# Patient Record
Sex: FEMALE | Race: WHITE | Employment: PART TIME | URBAN - NONMETROPOLITAN AREA
[De-identification: names, ages, dates, MRNs, and addresses within clinical notes are randomized per-mention and may not be internally consistent; named-entity substitution may affect disease eponyms.]

---

## 2017-03-24 ENCOUNTER — OFFICE VISIT (OUTPATIENT)
Dept: FAMILY MEDICINE CLINIC | Age: 62
End: 2017-03-24
Payer: MEDICAID

## 2017-03-24 VITALS
SYSTOLIC BLOOD PRESSURE: 126 MMHG | WEIGHT: 126 LBS | RESPIRATION RATE: 16 BRPM | HEIGHT: 62 IN | TEMPERATURE: 97.8 F | DIASTOLIC BLOOD PRESSURE: 82 MMHG | HEART RATE: 98 BPM | OXYGEN SATURATION: 99 % | BODY MASS INDEX: 23.19 KG/M2

## 2017-03-24 DIAGNOSIS — R74.8 ELEVATED LIVER ENZYMES: ICD-10-CM

## 2017-03-24 DIAGNOSIS — R74.8 ELEVATED LIVER ENZYMES: Primary | ICD-10-CM

## 2017-03-24 DIAGNOSIS — Z23 NEED FOR PROPHYLACTIC VACCINATION AGAINST STREPTOCOCCUS PNEUMONIAE (PNEUMOCOCCUS): ICD-10-CM

## 2017-03-24 LAB
ALBUMIN SERPL-MCNC: 4.8 G/DL (ref 3.5–5.2)
ALP BLD-CCNC: 108 U/L (ref 35–104)
ALT SERPL-CCNC: 20 U/L (ref 5–33)
AMYLASE: 37 U/L (ref 28–100)
AST SERPL-CCNC: 20 U/L (ref 5–32)
BILIRUB SERPL-MCNC: 0.3 MG/DL (ref 0.2–1.2)
BILIRUBIN DIRECT: 0.1 MG/DL (ref 0–0.3)
BILIRUBIN, INDIRECT: 0.2 MG/DL (ref 0.1–1)
LIPASE: 33 U/L (ref 13–60)
TOTAL PROTEIN: 7.4 G/DL (ref 6.6–8.7)

## 2017-03-24 PROCEDURE — 90732 PPSV23 VACC 2 YRS+ SUBQ/IM: CPT | Performed by: NURSE PRACTITIONER

## 2017-03-24 PROCEDURE — 90471 IMMUNIZATION ADMIN: CPT | Performed by: NURSE PRACTITIONER

## 2017-03-24 PROCEDURE — 99213 OFFICE O/P EST LOW 20 MIN: CPT | Performed by: NURSE PRACTITIONER

## 2017-03-24 RX ORDER — OMEPRAZOLE 20 MG/1
CAPSULE, DELAYED RELEASE ORAL
Qty: 30 CAPSULE | Refills: 5 | Status: SHIPPED | OUTPATIENT
Start: 2017-03-24 | End: 2018-01-24 | Stop reason: SDUPTHER

## 2017-03-24 ASSESSMENT — ENCOUNTER SYMPTOMS
ABDOMINAL DISTENTION: 0
BLOOD IN STOOL: 0
CONSTIPATION: 0
DIARRHEA: 0
NAUSEA: 0

## 2017-09-15 ENCOUNTER — OFFICE VISIT (OUTPATIENT)
Dept: FAMILY MEDICINE CLINIC | Age: 62
End: 2017-09-15
Payer: MEDICAID

## 2017-09-15 VITALS
TEMPERATURE: 98.9 F | DIASTOLIC BLOOD PRESSURE: 76 MMHG | BODY MASS INDEX: 24.14 KG/M2 | OXYGEN SATURATION: 99 % | HEART RATE: 100 BPM | WEIGHT: 132 LBS | SYSTOLIC BLOOD PRESSURE: 120 MMHG | RESPIRATION RATE: 16 BRPM

## 2017-09-15 DIAGNOSIS — Z13.220 SCREENING, LIPID: ICD-10-CM

## 2017-09-15 DIAGNOSIS — R53.83 FATIGUE, UNSPECIFIED TYPE: ICD-10-CM

## 2017-09-15 DIAGNOSIS — F41.9 ANXIETY AND DEPRESSION: ICD-10-CM

## 2017-09-15 DIAGNOSIS — F32.A ANXIETY AND DEPRESSION: ICD-10-CM

## 2017-09-15 DIAGNOSIS — Z88.9 ALLERGIC REACTION, HISTORY OF: Primary | ICD-10-CM

## 2017-09-15 LAB
ALBUMIN SERPL-MCNC: 4.5 G/DL (ref 3.5–5.2)
ALP BLD-CCNC: 98 U/L (ref 35–104)
ALT SERPL-CCNC: 19 U/L (ref 5–33)
ANION GAP SERPL CALCULATED.3IONS-SCNC: 16 MMOL/L (ref 7–19)
AST SERPL-CCNC: 23 U/L (ref 5–32)
BASOPHILS ABSOLUTE: 0 K/UL (ref 0–0.2)
BASOPHILS RELATIVE PERCENT: 0.5 % (ref 0–1)
BILIRUB SERPL-MCNC: 0.5 MG/DL (ref 0.2–1.2)
BILIRUBIN URINE: NEGATIVE
BLOOD, URINE: NEGATIVE
BUN BLDV-MCNC: 9 MG/DL (ref 8–23)
CALCIUM SERPL-MCNC: 9.7 MG/DL (ref 8.8–10.2)
CHLORIDE BLD-SCNC: 103 MMOL/L (ref 98–111)
CHOLESTEROL, TOTAL: 339 MG/DL (ref 160–199)
CLARITY: CLEAR
CO2: 24 MMOL/L (ref 22–29)
COLOR: YELLOW
CREAT SERPL-MCNC: 0.4 MG/DL (ref 0.5–0.9)
EOSINOPHILS ABSOLUTE: 0.1 K/UL (ref 0–0.6)
EOSINOPHILS RELATIVE PERCENT: 1.6 % (ref 0–5)
GFR NON-AFRICAN AMERICAN: >60
GLUCOSE BLD-MCNC: 100 MG/DL (ref 74–109)
GLUCOSE URINE: NEGATIVE MG/DL
HCT VFR BLD CALC: 38.2 % (ref 37–47)
HDLC SERPL-MCNC: 51 MG/DL (ref 65–121)
HEMOGLOBIN: 12.8 G/DL (ref 12–16)
KETONES, URINE: NEGATIVE MG/DL
LDL CHOLESTEROL CALCULATED: ABNORMAL MG/DL
LDL CHOLESTEROL DIRECT: 177 MG/DL
LEUKOCYTE ESTERASE, URINE: NEGATIVE
LYMPHOCYTES ABSOLUTE: 1.9 K/UL (ref 1.1–4.5)
LYMPHOCYTES RELATIVE PERCENT: 32.9 % (ref 20–40)
MCH RBC QN AUTO: 31.7 PG (ref 27–31)
MCHC RBC AUTO-ENTMCNC: 33.5 G/DL (ref 33–37)
MCV RBC AUTO: 94.6 FL (ref 81–99)
MONOCYTES ABSOLUTE: 0.4 K/UL (ref 0–0.9)
MONOCYTES RELATIVE PERCENT: 7.1 % (ref 0–10)
NEUTROPHILS ABSOLUTE: 3.4 K/UL (ref 1.5–7.5)
NEUTROPHILS RELATIVE PERCENT: 57.7 % (ref 50–65)
NITRITE, URINE: NEGATIVE
PDW BLD-RTO: 13.1 % (ref 11.5–14.5)
PH UA: 6.5
PLATELET # BLD: 274 K/UL (ref 130–400)
PMV BLD AUTO: 9.4 FL (ref 9.4–12.3)
POTASSIUM SERPL-SCNC: 4 MMOL/L (ref 3.5–5)
PROTEIN UA: NEGATIVE MG/DL
RBC # BLD: 4.04 M/UL (ref 4.2–5.4)
SODIUM BLD-SCNC: 143 MMOL/L (ref 136–145)
SPECIFIC GRAVITY UA: 1.02
T4 FREE: 1.3 NG/DL (ref 0.9–1.7)
TOTAL PROTEIN: 7.4 G/DL (ref 6.6–8.7)
TRIGL SERPL-MCNC: 534 MG/DL (ref 150–199)
TSH SERPL DL<=0.05 MIU/L-ACNC: 1.11 UIU/ML (ref 0.27–4.2)
UROBILINOGEN, URINE: 1 E.U./DL
VITAMIN B-12: 362 PG/ML (ref 211–946)
WBC # BLD: 5.8 K/UL (ref 4.8–10.8)

## 2017-09-15 PROCEDURE — G0444 DEPRESSION SCREEN ANNUAL: HCPCS | Performed by: NURSE PRACTITIONER

## 2017-09-15 PROCEDURE — 99214 OFFICE O/P EST MOD 30 MIN: CPT | Performed by: NURSE PRACTITIONER

## 2017-09-15 RX ORDER — EPINEPHRINE 0.3 MG/.3ML
INJECTION SUBCUTANEOUS
Qty: 2 EACH | Refills: 0 | Status: SHIPPED | OUTPATIENT
Start: 2017-09-15

## 2017-09-15 RX ORDER — ESCITALOPRAM OXALATE 10 MG/1
TABLET ORAL
Qty: 30 TABLET | Refills: 3 | Status: SHIPPED | OUTPATIENT
Start: 2017-09-15 | End: 2018-01-24 | Stop reason: SDUPTHER

## 2017-09-15 RX ORDER — DIPHENHYDRAMINE HCL 25 MG
CAPSULE ORAL
Qty: 30 CAPSULE | Refills: 1 | Status: SHIPPED | OUTPATIENT
Start: 2017-09-15 | End: 2019-03-13 | Stop reason: ALTCHOICE

## 2017-09-15 ASSESSMENT — PATIENT HEALTH QUESTIONNAIRE - PHQ9
9. THOUGHTS THAT YOU WOULD BE BETTER OFF DEAD, OR OF HURTING YOURSELF: 0
7. TROUBLE CONCENTRATING ON THINGS, SUCH AS READING THE NEWSPAPER OR WATCHING TELEVISION: 3
4. FEELING TIRED OR HAVING LITTLE ENERGY: 3
1. LITTLE INTEREST OR PLEASURE IN DOING THINGS: 3
8. MOVING OR SPEAKING SO SLOWLY THAT OTHER PEOPLE COULD HAVE NOTICED. OR THE OPPOSITE, BEING SO FIGETY OR RESTLESS THAT YOU HAVE BEEN MOVING AROUND A LOT MORE THAN USUAL: 3
10. IF YOU CHECKED OFF ANY PROBLEMS, HOW DIFFICULT HAVE THESE PROBLEMS MADE IT FOR YOU TO DO YOUR WORK, TAKE CARE OF THINGS AT HOME, OR GET ALONG WITH OTHER PEOPLE: 3
SUM OF ALL RESPONSES TO PHQ QUESTIONS 1-9: 22
2. FEELING DOWN, DEPRESSED OR HOPELESS: 3
3. TROUBLE FALLING OR STAYING ASLEEP: 3
6. FEELING BAD ABOUT YOURSELF - OR THAT YOU ARE A FAILURE OR HAVE LET YOURSELF OR YOUR FAMILY DOWN: 3
SUM OF ALL RESPONSES TO PHQ9 QUESTIONS 1 & 2: 6
5. POOR APPETITE OR OVEREATING: 1

## 2017-09-15 ASSESSMENT — ENCOUNTER SYMPTOMS
TROUBLE SWALLOWING: 0
DIARRHEA: 0
SHORTNESS OF BREATH: 0
NAUSEA: 0

## 2017-09-20 ENCOUNTER — OFFICE VISIT (OUTPATIENT)
Dept: PSYCHIATRY | Age: 62
End: 2017-09-20
Payer: MEDICAID

## 2017-09-20 DIAGNOSIS — F32.A DEPRESSIVE DISORDER: Primary | ICD-10-CM

## 2017-09-20 DIAGNOSIS — F41.1 GAD (GENERALIZED ANXIETY DISORDER): ICD-10-CM

## 2017-09-20 PROCEDURE — 90791 PSYCH DIAGNOSTIC EVALUATION: CPT | Performed by: SOCIAL WORKER

## 2017-09-29 ENCOUNTER — OFFICE VISIT (OUTPATIENT)
Dept: FAMILY MEDICINE CLINIC | Age: 62
End: 2017-09-29
Payer: MEDICAID

## 2017-09-29 VITALS
WEIGHT: 121.8 LBS | HEART RATE: 89 BPM | RESPIRATION RATE: 16 BRPM | BODY MASS INDEX: 22.28 KG/M2 | DIASTOLIC BLOOD PRESSURE: 70 MMHG | SYSTOLIC BLOOD PRESSURE: 112 MMHG | TEMPERATURE: 98.1 F | OXYGEN SATURATION: 98 %

## 2017-09-29 DIAGNOSIS — E78.2 MIXED HYPERLIPIDEMIA: Primary | ICD-10-CM

## 2017-09-29 DIAGNOSIS — F32.A ANXIETY AND DEPRESSION: ICD-10-CM

## 2017-09-29 DIAGNOSIS — F41.9 ANXIETY AND DEPRESSION: ICD-10-CM

## 2017-09-29 PROCEDURE — 99214 OFFICE O/P EST MOD 30 MIN: CPT | Performed by: NURSE PRACTITIONER

## 2017-09-29 RX ORDER — SIMVASTATIN 40 MG
TABLET ORAL
Qty: 30 TABLET | Refills: 2 | Status: SHIPPED | OUTPATIENT
Start: 2017-09-29 | End: 2018-01-08 | Stop reason: SDUPTHER

## 2017-09-29 ASSESSMENT — ENCOUNTER SYMPTOMS
SHORTNESS OF BREATH: 0
NAUSEA: 0

## 2017-10-04 ENCOUNTER — OFFICE VISIT (OUTPATIENT)
Dept: PSYCHIATRY | Age: 62
End: 2017-10-04
Payer: MEDICAID

## 2017-10-04 DIAGNOSIS — F32.A DEPRESSIVE DISORDER: Primary | ICD-10-CM

## 2017-10-04 PROCEDURE — 90832 PSYTX W PT 30 MINUTES: CPT | Performed by: SOCIAL WORKER

## 2017-10-04 NOTE — PROGRESS NOTES
Behavioral Health Consultation  Francoise Campa [place]  10/4/2017  4:13 PM      Time spent with Patient: 60 minutes  This is patient's second  [facility] appointment. Reason for Consult:    Chief Complaint   Patient presents with    Depression     Referring Provider: SHERRI Gonsalez, [address]      Feedback given to PCP. S:    CC: Depression    More active - went to the [facility] and [facility]. Pt and  bought some baby chicks. Going to visit her pet pig at her relatives home this afternoon. Taking med at night now instead of during the day. Pt says that the higher dose of anti depressant made her feel \"out of it. \" in a \"fog\"   Getting out and doing things- still having to motivate self- but discussed having an awareness of this.          O:  MSE:    Appearance    alert, cooperative, smiling  Appetite normal  Sleep disturbance No  Fatigue Yes  Loss of pleasure Yes  Impulsive behavior No  Speech    spontaneous, normal rate, normal volume and well articulated  Mood    Euthymic in presentation   Affect    normal affect  Thought Content    intact  Thought Process    coherent  Associations    logical connections  Insight    Good  Judgment    Intact  Orientation    oriented to person, place, time, and general circumstances  Memory    recent and remote memory intact  Attention/Concentration    intact  Morbid ideation No  Suicide Assessment    no suicidal ideation      History:    Medications:   Current Outpatient Prescriptions   Medication Sig Dispense Refill    simvastatin (ZOCOR) 40 MG tablet 1/2 po nightly for 1wk then increase to 1 po nightly for cholesterol 30 tablet 2    EPINEPHrine (EPIPEN) 0.3 MG/0.3ML SOAJ injection Use as directed for allergic reaction 2 each 0    diphenhydrAMINE (BENADRYL) 25 MG capsule 1-2 every 6hrs as needed 30 capsule 1    escitalopram (LEXAPRO) 10 MG tablet 1/2 po daily for 1wk then increase to 1 po daily for

## 2017-10-04 NOTE — MR AVS SNAPSHOT
escitalopram (LEXAPRO) 10 MG tablet 1/2 po daily for 1wk then increase to 1 po daily for anxiety    omeprazole (PRILOSEC) 20 MG delayed release capsule 1 po daily for stomach protection    Cyanocobalamin (B-12 PO) Take 1 tablet by mouth daily    Multiple Vitamins-Minerals (THERAPEUTIC MULTIVITAMIN-MINERALS) tablet Take 1 tablet by mouth daily    aspirin EC 81 MG EC tablet Take 81 mg by mouth daily      Allergies              Diclofenac Sodium     Flushing and soa         Additional Information        Basic Information     Date Of Birth Sex Race Ethnicity Preferred Language    1955 Female White Non-/Non  English      Problem List as of 10/4/2017  Date Reviewed: 9/29/2017                Hip pain, left      Immunizations as of 10/4/2017     Name Date    Pneumococcal Polysaccharide ([id number]) 3/24/2017    Tetanus 6/22/2016      Preventive Care        Date Due    HIV screening is recommended for all people regardless of risk factors  aged 15-65 years at least once (lifetime) who have never been HIV tested. 5/28/1970    Tetanus Combination Vaccine (1 - Tdap) 6/23/2016    Yearly Flu Vaccine (1) 11/1/2017 (Originally 9/1/2017)    Mammograms are recommended every 2 years for low/average risk patients aged 48 - 69, and every year for high risk patients per updated national guidelines. However these guidelines can be individualized by your provider. 6/20/2018    Pap Smear 5/16/2019    Cholesterol Screening 9/15/2022    Colonoscopy 5/16/2026            [employer] Signup           [employer] allows you to send messages to your doctor, view your test results, renew your prescriptions, schedule appointments, view visit notes, and more. How Do I Sign Up? 1. In your Internet browser, go to [email or web address]  2. Click on the Sign Up Now link in the Sign In box. You will see the New Member Sign Up page. 3. Enter your [employer] Access Code exactly as it appears below.  You will not need to use this code after youve completed the sign-up process. If you do not sign up before the expiration date, you must request a new code. [employer] Access Code: [email or web address]  Expires: 11/14/2017 12:09 PM    4. Enter your Social Security Number ([id number]) and Date of Birth (mm/dd/yyyy) as indicated and click Submit. You will be taken to the next sign-up page. 5. Create a [employer] ID. This will be your [employer] login ID and cannot be changed, so think of one that is secure and easy to remember. 6. Create a [employer] password. You can change your password at any time. 7. Enter your Password Reset Question and Answer. This can be used at a later time if you forget your password. 8. Enter your e-mail address. You will receive e-mail notification when new information is available in [address] 9. Click Sign Up. You can now view your medical record. Additional Information  If you have questions, please contact the physician practice where you receive care. Remember, [employer] is NOT to be used for urgent needs. For medical emergencies, dial 911. For questions regarding your [employer] account call [phone]. If you have a clinical question, please call your doctor's office.

## 2017-10-04 NOTE — PATIENT INSTRUCTIONS
1. Continue taking medications as prescribed. 2.  Return in 2 weeks as schedule. 3.  Call Esme Martinez at [phone] if you need to come see me.

## 2017-12-21 ENCOUNTER — OFFICE VISIT (OUTPATIENT)
Dept: FAMILY MEDICINE CLINIC | Age: 62
End: 2017-12-21
Payer: MEDICAID

## 2017-12-21 VITALS
DIASTOLIC BLOOD PRESSURE: 78 MMHG | RESPIRATION RATE: 18 BRPM | OXYGEN SATURATION: 98 % | TEMPERATURE: 98 F | WEIGHT: 126 LBS | BODY MASS INDEX: 23.05 KG/M2 | SYSTOLIC BLOOD PRESSURE: 130 MMHG | HEART RATE: 76 BPM

## 2017-12-21 DIAGNOSIS — H66.002 ACUTE SUPPURATIVE OTITIS MEDIA OF LEFT EAR WITHOUT SPONTANEOUS RUPTURE OF TYMPANIC MEMBRANE, RECURRENCE NOT SPECIFIED: Primary | ICD-10-CM

## 2017-12-21 PROCEDURE — 4004F PT TOBACCO SCREEN RCVD TLK: CPT | Performed by: NURSE PRACTITIONER

## 2017-12-21 PROCEDURE — 3017F COLORECTAL CA SCREEN DOC REV: CPT | Performed by: NURSE PRACTITIONER

## 2017-12-21 PROCEDURE — 3014F SCREEN MAMMO DOC REV: CPT | Performed by: NURSE PRACTITIONER

## 2017-12-21 PROCEDURE — G8420 CALC BMI NORM PARAMETERS: HCPCS | Performed by: NURSE PRACTITIONER

## 2017-12-21 PROCEDURE — 99213 OFFICE O/P EST LOW 20 MIN: CPT | Performed by: NURSE PRACTITIONER

## 2017-12-21 PROCEDURE — G8427 DOCREV CUR MEDS BY ELIG CLIN: HCPCS | Performed by: NURSE PRACTITIONER

## 2017-12-21 PROCEDURE — G8484 FLU IMMUNIZE NO ADMIN: HCPCS | Performed by: NURSE PRACTITIONER

## 2017-12-21 RX ORDER — AMOXICILLIN AND CLAVULANATE POTASSIUM 875; 125 MG/1; MG/1
1 TABLET, FILM COATED ORAL 2 TIMES DAILY
Qty: 20 TABLET | Refills: 0 | Status: SHIPPED | OUTPATIENT
Start: 2017-12-21 | End: 2017-12-31

## 2017-12-21 ASSESSMENT — ENCOUNTER SYMPTOMS: COUGH: 1

## 2018-01-08 ENCOUNTER — TELEPHONE (OUTPATIENT)
Dept: FAMILY MEDICINE CLINIC | Age: 63
End: 2018-01-08

## 2018-01-08 DIAGNOSIS — E78.2 MIXED HYPERLIPIDEMIA: ICD-10-CM

## 2018-01-08 RX ORDER — SIMVASTATIN 40 MG
40 TABLET ORAL NIGHTLY
Qty: 30 TABLET | Refills: 2 | Status: SHIPPED | OUTPATIENT
Start: 2018-01-08 | End: 2018-01-24 | Stop reason: SDUPTHER

## 2018-01-08 NOTE — TELEPHONE ENCOUNTER
Patient called requesting refill on simvastatin, she was supposed to have followed up with Arlen Dunbar in October 2017. I have sent in 1 month supply and left voice mail for patient instructing her to call the office and schedule appointment prior to further refills.

## 2018-01-24 ENCOUNTER — TELEPHONE (OUTPATIENT)
Dept: FAMILY MEDICINE CLINIC | Age: 63
End: 2018-01-24

## 2018-01-24 ENCOUNTER — OFFICE VISIT (OUTPATIENT)
Dept: FAMILY MEDICINE CLINIC | Age: 63
End: 2018-01-24
Payer: MEDICAID

## 2018-01-24 VITALS
DIASTOLIC BLOOD PRESSURE: 70 MMHG | BODY MASS INDEX: 23.05 KG/M2 | TEMPERATURE: 98.1 F | OXYGEN SATURATION: 98 % | RESPIRATION RATE: 16 BRPM | HEART RATE: 85 BPM | WEIGHT: 126 LBS | SYSTOLIC BLOOD PRESSURE: 132 MMHG

## 2018-01-24 DIAGNOSIS — F32.A ANXIETY AND DEPRESSION: ICD-10-CM

## 2018-01-24 DIAGNOSIS — Z11.4 SCREENING FOR HIV WITHOUT PRESENCE OF RISK FACTORS: ICD-10-CM

## 2018-01-24 DIAGNOSIS — F41.9 ANXIETY AND DEPRESSION: ICD-10-CM

## 2018-01-24 DIAGNOSIS — E78.2 MIXED HYPERLIPIDEMIA: Primary | ICD-10-CM

## 2018-01-24 DIAGNOSIS — E78.2 MIXED HYPERLIPIDEMIA: ICD-10-CM

## 2018-01-24 LAB
ALBUMIN SERPL-MCNC: 4.6 G/DL (ref 3.5–5.2)
ALP BLD-CCNC: 108 U/L (ref 35–104)
ALT SERPL-CCNC: 74 U/L (ref 5–33)
ANION GAP SERPL CALCULATED.3IONS-SCNC: 18 MMOL/L (ref 7–19)
AST SERPL-CCNC: 46 U/L (ref 5–32)
BILIRUB SERPL-MCNC: 0.5 MG/DL (ref 0.2–1.2)
BUN BLDV-MCNC: 9 MG/DL (ref 8–23)
CALCIUM SERPL-MCNC: 9.3 MG/DL (ref 8.8–10.2)
CHLORIDE BLD-SCNC: 101 MMOL/L (ref 98–111)
CHOLESTEROL, TOTAL: 237 MG/DL (ref 160–199)
CO2: 23 MMOL/L (ref 22–29)
CREAT SERPL-MCNC: <0.5 MG/DL (ref 0.5–0.9)
GFR NON-AFRICAN AMERICAN: >60
GLUCOSE BLD-MCNC: 104 MG/DL (ref 74–109)
HDLC SERPL-MCNC: 68 MG/DL (ref 65–121)
LDL CHOLESTEROL CALCULATED: 93 MG/DL
POTASSIUM SERPL-SCNC: 4.1 MMOL/L (ref 3.5–5)
SODIUM BLD-SCNC: 142 MMOL/L (ref 136–145)
TOTAL PROTEIN: 7.2 G/DL (ref 6.6–8.7)
TRIGL SERPL-MCNC: 380 MG/DL (ref 0–149)

## 2018-01-24 PROCEDURE — 99213 OFFICE O/P EST LOW 20 MIN: CPT | Performed by: NURSE PRACTITIONER

## 2018-01-24 RX ORDER — SIMVASTATIN 40 MG
40 TABLET ORAL NIGHTLY
Qty: 30 TABLET | Refills: 5 | Status: SHIPPED | OUTPATIENT
Start: 2018-01-24 | End: 2018-10-15 | Stop reason: SDUPTHER

## 2018-01-24 RX ORDER — OMEPRAZOLE 20 MG/1
CAPSULE, DELAYED RELEASE ORAL
Qty: 30 CAPSULE | Refills: 5 | Status: SHIPPED | OUTPATIENT
Start: 2018-01-24 | End: 2018-11-12 | Stop reason: SDUPTHER

## 2018-01-24 RX ORDER — ESCITALOPRAM OXALATE 10 MG/1
TABLET ORAL
Qty: 30 TABLET | Refills: 5 | Status: SHIPPED | OUTPATIENT
Start: 2018-01-24 | End: 2018-11-12 | Stop reason: SDUPTHER

## 2018-01-24 RX ORDER — ESCITALOPRAM OXALATE 10 MG/1
TABLET ORAL
Qty: 30 TABLET | Refills: 5 | Status: SHIPPED | OUTPATIENT
Start: 2018-01-24 | End: 2018-01-24 | Stop reason: SDUPTHER

## 2018-01-24 ASSESSMENT — ENCOUNTER SYMPTOMS
SHORTNESS OF BREATH: 0
TROUBLE SWALLOWING: 0

## 2018-01-24 NOTE — PROGRESS NOTES
Subjective:      Patient ID: Devin Ardon is a 58 y.o. female. Chief Complaint   Patient presents with    Hyperlipidemia     Patient is here today for a follow up. Patient is fasting for lab work. HPI  Pt is here today f/u on hyperlipidemia. She denies any complaints with statin treatment. Last lab was done over 4mo ago and pt then started statin. She will have fasting lab done today. She has stopped smoking since last visit. Depression scale has been reviewed. Pt is requesting refill of lexapro and feels it has been working well. She is contributing some stress to work environment and being around smokers as she is trying to abstain from tobacco.  She stopped approx 3mo ago. Review of Systems   Constitutional: Negative for unexpected weight change. HENT: Negative for trouble swallowing. Respiratory: Negative for shortness of breath. Psychiatric/Behavioral: The patient is not nervous/anxious (pt feels medication is working well). Past Medical History:   Diagnosis Date    Anxiety     Depression     Hip pain, left     Hyperlipidemia      Current Outpatient Prescriptions on File Prior to Visit   Medication Sig Dispense Refill    EPINEPHrine (EPIPEN) 0.3 MG/0.3ML SOAJ injection Use as directed for allergic reaction 2 each 0    diphenhydrAMINE (BENADRYL) 25 MG capsule 1-2 every 6hrs as needed 30 capsule 1    Cyanocobalamin (B-12 PO) Take 1 tablet by mouth daily      Multiple Vitamins-Minerals (THERAPEUTIC MULTIVITAMIN-MINERALS) tablet Take 1 tablet by mouth daily      aspirin EC 81 MG EC tablet Take 81 mg by mouth daily       No current facility-administered medications on file prior to visit. Allergies   Allergen Reactions    Peanut-Containing Drug Products Shortness Of Breath    Diclofenac Sodium      Flushing and soa       Objective:   Physical Exam   Constitutional: She is oriented to person, place, and time. She appears well-developed and well-nourished. HENT:   Head: Normocephalic and atraumatic. Eyes: Conjunctivae and EOM are normal. Pupils are equal, round, and reactive to light. Neck: Normal range of motion. Neck supple. No tracheal deviation present. No thyromegaly present. Cardiovascular: Normal rate, regular rhythm and normal heart sounds. Pulmonary/Chest: Effort normal and breath sounds normal. No respiratory distress. Neurological: She is alert and oriented to person, place, and time. Skin: Skin is warm and dry. Psychiatric: She has a normal mood and affect. Her speech is normal and behavior is normal. Judgment and thought content normal. Her mood appears not anxious. Her affect is not angry. Cognition and memory are normal. She does not exhibit a depressed mood. Nursing note and vitals reviewed. /70 (Site: Right Arm, Position: Sitting, Cuff Size: Small Adult)   Pulse 85   Temp 98.1 °F (36.7 °C) (Temporal)   Resp 16   Wt 126 lb (57.2 kg)   SpO2 98%   BMI 23.05 kg/m²     Assessment:      1. Mixed hyperlipidemia  simvastatin (ZOCOR) 40 MG tablet   2. Anxiety and depression  escitalopram (LEXAPRO) 10 MG tablet   3. Screening for HIV without presence of risk factors  HIV-1,-2 w/Reflex to HIV-1 Western Blot           Plan:    Fasting lab today as previously ordered.   Pt to f/u prn and pending lab review

## 2018-01-27 LAB — HIV-1 AND HIV-2 ANTIBODIES: NEGATIVE

## 2018-03-16 ENCOUNTER — OFFICE VISIT (OUTPATIENT)
Dept: FAMILY MEDICINE CLINIC | Age: 63
End: 2018-03-16
Payer: COMMERCIAL

## 2018-03-16 VITALS
WEIGHT: 126 LBS | OXYGEN SATURATION: 98 % | RESPIRATION RATE: 16 BRPM | HEART RATE: 88 BPM | SYSTOLIC BLOOD PRESSURE: 134 MMHG | DIASTOLIC BLOOD PRESSURE: 80 MMHG | HEIGHT: 63 IN | BODY MASS INDEX: 22.32 KG/M2 | TEMPERATURE: 98 F

## 2018-03-16 DIAGNOSIS — Z12.31 SCREENING MAMMOGRAM, ENCOUNTER FOR: ICD-10-CM

## 2018-03-16 DIAGNOSIS — R74.8 ELEVATED LIVER ENZYMES: Primary | ICD-10-CM

## 2018-03-16 DIAGNOSIS — R74.8 ELEVATED LIVER ENZYMES: ICD-10-CM

## 2018-03-16 LAB
ALBUMIN SERPL-MCNC: 4.7 G/DL (ref 3.5–5.2)
ALP BLD-CCNC: 98 U/L (ref 35–104)
ALT SERPL-CCNC: 41 U/L (ref 5–33)
AST SERPL-CCNC: 31 U/L (ref 5–32)
BILIRUB SERPL-MCNC: 0.4 MG/DL (ref 0.2–1.2)
BILIRUBIN DIRECT: 0.1 MG/DL (ref 0–0.3)
BILIRUBIN, INDIRECT: 0.3 MG/DL (ref 0.1–1)
TOTAL PROTEIN: 7.1 G/DL (ref 6.6–8.7)

## 2018-03-16 PROCEDURE — 99213 OFFICE O/P EST LOW 20 MIN: CPT | Performed by: NURSE PRACTITIONER

## 2018-03-16 ASSESSMENT — ENCOUNTER SYMPTOMS
CONSTIPATION: 0
DIARRHEA: 0
NAUSEA: 0
VOMITING: 0

## 2018-03-16 ASSESSMENT — PATIENT HEALTH QUESTIONNAIRE - PHQ9
SUM OF ALL RESPONSES TO PHQ QUESTIONS 1-9: 0
1. LITTLE INTEREST OR PLEASURE IN DOING THINGS: 0
SUM OF ALL RESPONSES TO PHQ9 QUESTIONS 1 & 2: 0

## 2018-03-16 NOTE — PROGRESS NOTES
the non-reactive anti-HIV (MJ) screen, the HIV Western blot  is not  indicated and therefore not performed. INTERPRETIVE INFORMATION: HIV-1,-2 w/Reflex to HIV-1 Western Blot  This assay should not be used for blood donor screening, associated  re-entry  protocols, or for screening Human Cells, Tissues and Cellular and  Tissue-Based Products (HCT/P). Performed by [facility] , [address] [facility] [phone]  www. Frank Gooden MD - Lab.  Director      Cholesterol, Total 01/24/2018 237* 160 - 199 mg/dL Final    Comment: <160 MG/DL=OPTIMAL    160-199 MG/DL= DESIRABLE    200-239 MG/DL=BORDERLINE-INCREASED RISK OF ATHEROSCLEROTIC  CARDIOVASCULAR DISEASE    > OR = 240 MG/DL-ASSOCIATED WITH AN INCREASED RISK OF  ATHROSCLEROTIC CARDIOVASCULAR DISEASE      Triglycerides 01/24/2018 380* 0 - 149 mg/dL Final    HDL 01/24/2018 68  65 - 121 mg/dL Final    Comment: VALUES>60 MG/DL ARE ASSOCIATED WITH A DECREASED RISK OF  ATHEROSCLEROTIC CARDIOVASCULAR DISEASE      LDL Calculated 01/24/2018 93  <100 mg/dL Final    Comment: <100 MG/DL=OPITIMAL    100-129 MG/DL=DESIRABLE    130-159 MG/DL BORDERLINE=INCREASED RISK OF ATHEROSCLEROTIC  CARDIOVASCULAR DISEASE    > OR = 160 MG/DL=ASSOCIATED WITH AN INCREASE RISK OF  ATHEROSCLEROTIC CARDIOVASCULAR DISEASE       Lab reviewed with patient in detail                   Past Medical History:   Diagnosis Date    Anxiety     Depression     Hip pain, left     Hyperlipidemia      Current Outpatient Prescriptions on File Prior to Visit   Medication Sig Dispense Refill    simvastatin (ZOCOR) 40 MG tablet Take 1 tablet by mouth nightly 30 tablet 5    omeprazole (PRILOSEC) 20 MG delayed release capsule 1 po daily for stomach protection 30 capsule 5    escitalopram (LEXAPRO) 10 MG tablet 1 po daily for anxiety 30 tablet 5    EPINEPHrine (EPIPEN) 0.3 MG/0.3ML SOAJ injection Use as directed for allergic reaction 2 each 0    diphenhydrAMINE (BENADRYL) 25 MG

## 2018-10-15 DIAGNOSIS — E78.2 MIXED HYPERLIPIDEMIA: ICD-10-CM

## 2018-10-15 RX ORDER — SIMVASTATIN 40 MG
40 TABLET ORAL NIGHTLY
Qty: 30 TABLET | Refills: 0 | Status: SHIPPED | OUTPATIENT
Start: 2018-10-15 | End: 2018-11-12 | Stop reason: SDUPTHER

## 2018-11-12 ENCOUNTER — OFFICE VISIT (OUTPATIENT)
Dept: FAMILY MEDICINE CLINIC | Age: 63
End: 2018-11-12
Payer: COMMERCIAL

## 2018-11-12 ENCOUNTER — HOSPITAL ENCOUNTER (OUTPATIENT)
Dept: GENERAL RADIOLOGY | Age: 63
Discharge: HOME | End: 2018-11-12
Payer: COMMERCIAL

## 2018-11-12 VITALS
HEART RATE: 86 BPM | TEMPERATURE: 97.8 F | SYSTOLIC BLOOD PRESSURE: 118 MMHG | DIASTOLIC BLOOD PRESSURE: 82 MMHG | RESPIRATION RATE: 18 BRPM | HEIGHT: 62 IN | OXYGEN SATURATION: 99 % | WEIGHT: 125 LBS | BODY MASS INDEX: 23 KG/M2

## 2018-11-12 DIAGNOSIS — F41.9 ANXIETY AND DEPRESSION: ICD-10-CM

## 2018-11-12 DIAGNOSIS — K21.9 GASTROESOPHAGEAL REFLUX DISEASE, ESOPHAGITIS PRESENCE NOT SPECIFIED: ICD-10-CM

## 2018-11-12 DIAGNOSIS — E78.2 MIXED HYPERLIPIDEMIA: ICD-10-CM

## 2018-11-12 DIAGNOSIS — J06.9 ACUTE URI: ICD-10-CM

## 2018-11-12 DIAGNOSIS — R80.9 PROTEINURIA, UNSPECIFIED TYPE: Primary | ICD-10-CM

## 2018-11-12 DIAGNOSIS — F32.A ANXIETY AND DEPRESSION: ICD-10-CM

## 2018-11-12 DIAGNOSIS — J06.9 ACUTE URI: Primary | ICD-10-CM

## 2018-11-12 LAB
ALBUMIN SERPL-MCNC: 4.6 G/DL (ref 3.5–5.2)
ALP BLD-CCNC: 97 U/L (ref 35–104)
ALT SERPL-CCNC: 38 U/L (ref 5–33)
ANION GAP SERPL CALCULATED.3IONS-SCNC: 17 MMOL/L (ref 7–19)
AST SERPL-CCNC: 34 U/L (ref 5–32)
BASOPHILS ABSOLUTE: 0 K/UL (ref 0–0.2)
BASOPHILS RELATIVE PERCENT: 0.8 % (ref 0–1)
BILIRUB SERPL-MCNC: 0.4 MG/DL (ref 0.2–1.2)
BILIRUBIN URINE: NEGATIVE
BLOOD, URINE: NEGATIVE
BUN BLDV-MCNC: 9 MG/DL (ref 8–23)
CALCIUM SERPL-MCNC: 9.4 MG/DL (ref 8.8–10.2)
CHLORIDE BLD-SCNC: 101 MMOL/L (ref 98–111)
CHOLESTEROL, TOTAL: 242 MG/DL (ref 160–199)
CLARITY: CLEAR
CO2: 25 MMOL/L (ref 22–29)
COLOR: YELLOW
CREAT SERPL-MCNC: 0.5 MG/DL (ref 0.5–0.9)
EOSINOPHILS ABSOLUTE: 0.1 K/UL (ref 0–0.6)
EOSINOPHILS RELATIVE PERCENT: 1.5 % (ref 0–5)
GFR NON-AFRICAN AMERICAN: >60
GLUCOSE BLD-MCNC: 111 MG/DL (ref 74–109)
GLUCOSE URINE: NEGATIVE MG/DL
HCT VFR BLD CALC: 39.3 % (ref 37–47)
HDLC SERPL-MCNC: 78 MG/DL (ref 65–121)
HEMOGLOBIN: 12.9 G/DL (ref 12–16)
KETONES, URINE: NEGATIVE MG/DL
LDL CHOLESTEROL CALCULATED: 92 MG/DL
LEUKOCYTE ESTERASE, URINE: NEGATIVE
LYMPHOCYTES ABSOLUTE: 1.3 K/UL (ref 1.1–4.5)
LYMPHOCYTES RELATIVE PERCENT: 27.2 % (ref 20–40)
MCH RBC QN AUTO: 30.7 PG (ref 27–31)
MCHC RBC AUTO-ENTMCNC: 32.8 G/DL (ref 33–37)
MCV RBC AUTO: 93.6 FL (ref 81–99)
MONOCYTES ABSOLUTE: 0.4 K/UL (ref 0–0.9)
MONOCYTES RELATIVE PERCENT: 8.3 % (ref 0–10)
NEUTROPHILS ABSOLUTE: 2.9 K/UL (ref 1.5–7.5)
NEUTROPHILS RELATIVE PERCENT: 62 % (ref 50–65)
NITRITE, URINE: NEGATIVE
PDW BLD-RTO: 12.5 % (ref 11.5–14.5)
PH UA: 7
PLATELET # BLD: 227 K/UL (ref 130–400)
PMV BLD AUTO: 9.7 FL (ref 9.4–12.3)
POTASSIUM SERPL-SCNC: 3.6 MMOL/L (ref 3.5–5)
PROTEIN UA: ABNORMAL MG/DL
RBC # BLD: 4.2 M/UL (ref 4.2–5.4)
SODIUM BLD-SCNC: 143 MMOL/L (ref 136–145)
SPECIFIC GRAVITY UA: 1.02
TOTAL PROTEIN: 7.3 G/DL (ref 6.6–8.7)
TRIGL SERPL-MCNC: 361 MG/DL (ref 0–149)
TSH SERPL DL<=0.05 MIU/L-ACNC: 1.32 UIU/ML (ref 0.27–4.2)
URINE REFLEX TO CULTURE: ABNORMAL
UROBILINOGEN, URINE: 0.2 E.U./DL
WBC # BLD: 4.7 K/UL (ref 4.8–10.8)

## 2018-11-12 PROCEDURE — 71046 X-RAY EXAM CHEST 2 VIEWS: CPT

## 2018-11-12 PROCEDURE — 99214 OFFICE O/P EST MOD 30 MIN: CPT | Performed by: NURSE PRACTITIONER

## 2018-11-12 RX ORDER — OMEPRAZOLE 20 MG/1
CAPSULE, DELAYED RELEASE ORAL
Qty: 30 CAPSULE | Refills: 5 | Status: SHIPPED | OUTPATIENT
Start: 2018-11-12 | End: 2019-03-13 | Stop reason: SDUPTHER

## 2018-11-12 RX ORDER — PROMETHAZINE HYDROCHLORIDE AND CODEINE PHOSPHATE 6.25; 1 MG/5ML; MG/5ML
5 SYRUP ORAL EVERY 4 HOURS PRN
Qty: 120 ML | Refills: 0 | Status: SHIPPED | OUTPATIENT
Start: 2018-11-12 | End: 2018-11-19

## 2018-11-12 RX ORDER — SIMVASTATIN 40 MG
40 TABLET ORAL NIGHTLY
Qty: 30 TABLET | Refills: 5 | Status: SHIPPED | OUTPATIENT
Start: 2018-11-12 | End: 2019-06-12 | Stop reason: SDUPTHER

## 2018-11-12 RX ORDER — ESCITALOPRAM OXALATE 10 MG/1
TABLET ORAL
Qty: 30 TABLET | Refills: 5 | Status: SHIPPED | OUTPATIENT
Start: 2018-11-12 | End: 2019-02-27 | Stop reason: SDUPTHER

## 2018-11-12 RX ORDER — AMOXICILLIN AND CLAVULANATE POTASSIUM 500; 125 MG/1; MG/1
1 TABLET, FILM COATED ORAL 2 TIMES DAILY
Qty: 20 TABLET | Refills: 0 | Status: SHIPPED | OUTPATIENT
Start: 2018-11-12 | End: 2018-11-22

## 2018-11-12 RX ORDER — ALBUTEROL SULFATE 90 UG/1
2 AEROSOL, METERED RESPIRATORY (INHALATION) 4 TIMES DAILY PRN
Qty: 1 INHALER | Refills: 0 | Status: SHIPPED | OUTPATIENT
Start: 2018-11-12 | End: 2019-03-13 | Stop reason: ALTCHOICE

## 2018-11-12 ASSESSMENT — ENCOUNTER SYMPTOMS
COUGH: 1
SHORTNESS OF BREATH: 0
ABDOMINAL PAIN: 0

## 2018-11-12 NOTE — PROGRESS NOTES
JAMISON Gonzalez: Medication Refill (Patient presents for medication refills and fasting blood work); Cough (productive cough x 2 weeks); and Shortness of Breath  Plan as associated above. Increase fluids, rest, tylenol or ibuprofen as needed. Follow up in 3 days if you are not feeling improvement and ASAP if worse. Lab today  YOEL  Diagnosis and orders for this visit are above. Please note that this chart was generated using dragon dictationsoftware. Although every effort was made to ensure the accuracy of this automated transcription, some errors in transcription may have occurred.

## 2018-11-12 NOTE — LETTER
[place] [address]  Phone: [phone]  Fax: [phone]    Mickiel Claude, APRN        November 12, 2018     Patient: Julia Alexis   YOB: 1955   Date of Visit: 11/12/2018       To Whom It May Concern: It is my medical opinion that Mila Ritchie may return to work on 11/14/2018. If you have any questions or concerns, please don't hesitate to call.     Sincerely,        Mickiel Claude, APRN

## 2018-11-21 ENCOUNTER — TELEPHONE (OUTPATIENT)
Dept: FAMILY MEDICINE CLINIC | Age: 63
End: 2018-11-21

## 2018-11-21 DIAGNOSIS — E78.00 HIGH CHOLESTEROL: Primary | ICD-10-CM

## 2018-11-21 RX ORDER — EZETIMIBE 10 MG/1
10 TABLET ORAL DAILY
Qty: 30 TABLET | Refills: 5 | Status: SHIPPED | OUTPATIENT
Start: 2018-11-21 | End: 2019-03-13 | Stop reason: SDUPTHER

## 2019-02-27 ENCOUNTER — OFFICE VISIT (OUTPATIENT)
Dept: FAMILY MEDICINE CLINIC | Age: 64
End: 2019-02-27
Payer: COMMERCIAL

## 2019-02-27 VITALS
WEIGHT: 127 LBS | DIASTOLIC BLOOD PRESSURE: 78 MMHG | HEIGHT: 62 IN | TEMPERATURE: 97.1 F | BODY MASS INDEX: 23.37 KG/M2 | SYSTOLIC BLOOD PRESSURE: 130 MMHG | OXYGEN SATURATION: 96 % | HEART RATE: 92 BPM

## 2019-02-27 DIAGNOSIS — B96.89 ACUTE BACTERIAL SINUSITIS: ICD-10-CM

## 2019-02-27 DIAGNOSIS — F32.A ANXIETY AND DEPRESSION: ICD-10-CM

## 2019-02-27 DIAGNOSIS — E78.2 MIXED HYPERLIPIDEMIA: ICD-10-CM

## 2019-02-27 DIAGNOSIS — B96.89 ACUTE BACTERIAL SINUSITIS: Primary | ICD-10-CM

## 2019-02-27 DIAGNOSIS — R80.9 PROTEINURIA, UNSPECIFIED TYPE: ICD-10-CM

## 2019-02-27 DIAGNOSIS — F41.9 ANXIETY AND DEPRESSION: ICD-10-CM

## 2019-02-27 DIAGNOSIS — R19.7 INTERMITTENT DIARRHEA: ICD-10-CM

## 2019-02-27 DIAGNOSIS — J01.90 ACUTE BACTERIAL SINUSITIS: ICD-10-CM

## 2019-02-27 DIAGNOSIS — J01.90 ACUTE BACTERIAL SINUSITIS: Primary | ICD-10-CM

## 2019-02-27 LAB
ALBUMIN SERPL-MCNC: 4.6 G/DL (ref 3.5–5.2)
ALP BLD-CCNC: 101 U/L (ref 35–104)
ALT SERPL-CCNC: 39 U/L (ref 5–33)
AMYLASE: 32 U/L (ref 28–100)
ANION GAP SERPL CALCULATED.3IONS-SCNC: 17 MMOL/L (ref 7–19)
AST SERPL-CCNC: 21 U/L (ref 5–32)
BASOPHILS ABSOLUTE: 0.1 K/UL (ref 0–0.2)
BASOPHILS RELATIVE PERCENT: 1.1 % (ref 0–1)
BILIRUB SERPL-MCNC: 0.6 MG/DL (ref 0.2–1.2)
BILIRUBIN URINE: NEGATIVE
BLOOD, URINE: NEGATIVE
BUN BLDV-MCNC: 7 MG/DL (ref 8–23)
CALCIUM SERPL-MCNC: 9.1 MG/DL (ref 8.8–10.2)
CHLORIDE BLD-SCNC: 103 MMOL/L (ref 98–111)
CHOLESTEROL, TOTAL: 233 MG/DL (ref 160–199)
CLARITY: ABNORMAL
CO2: 22 MMOL/L (ref 22–29)
COLOR: ABNORMAL
CREAT SERPL-MCNC: <0.5 MG/DL (ref 0.5–0.9)
EOSINOPHILS ABSOLUTE: 0.1 K/UL (ref 0–0.6)
EOSINOPHILS RELATIVE PERCENT: 1.7 % (ref 0–5)
GFR NON-AFRICAN AMERICAN: >60
GLUCOSE BLD-MCNC: 106 MG/DL (ref 74–109)
GLUCOSE URINE: NEGATIVE MG/DL
HCT VFR BLD CALC: 38.7 % (ref 37–47)
HDLC SERPL-MCNC: 78 MG/DL (ref 65–121)
HEMOGLOBIN: 12.7 G/DL (ref 12–16)
KETONES, URINE: NEGATIVE MG/DL
LDL CHOLESTEROL CALCULATED: 116 MG/DL
LEUKOCYTE ESTERASE, URINE: NEGATIVE
LIPASE: 31 U/L (ref 13–60)
LYMPHOCYTES ABSOLUTE: 1.6 K/UL (ref 1.1–4.5)
LYMPHOCYTES RELATIVE PERCENT: 29.7 % (ref 20–40)
MCH RBC QN AUTO: 30.5 PG (ref 27–31)
MCHC RBC AUTO-ENTMCNC: 32.8 G/DL (ref 33–37)
MCV RBC AUTO: 93 FL (ref 81–99)
MONOCYTES ABSOLUTE: 0.5 K/UL (ref 0–0.9)
MONOCYTES RELATIVE PERCENT: 8.7 % (ref 0–10)
NEUTROPHILS ABSOLUTE: 3.1 K/UL (ref 1.5–7.5)
NEUTROPHILS RELATIVE PERCENT: 58.6 % (ref 50–65)
NITRITE, URINE: NEGATIVE
PDW BLD-RTO: 12.6 % (ref 11.5–14.5)
PH UA: 5.5
PLATELET # BLD: 237 K/UL (ref 130–400)
PMV BLD AUTO: 9.6 FL (ref 9.4–12.3)
POTASSIUM SERPL-SCNC: 3.8 MMOL/L (ref 3.5–5)
PROTEIN UA: ABNORMAL MG/DL
RBC # BLD: 4.16 M/UL (ref 4.2–5.4)
SODIUM BLD-SCNC: 142 MMOL/L (ref 136–145)
SPECIFIC GRAVITY UA: 1.02
T4 FREE: 1.1 NG/DL (ref 0.9–1.7)
TOTAL PROTEIN: 7.2 G/DL (ref 6.6–8.7)
TRIGL SERPL-MCNC: 195 MG/DL (ref 0–149)
TSH SERPL DL<=0.05 MIU/L-ACNC: 0.82 UIU/ML (ref 0.27–4.2)
URINE REFLEX TO CULTURE: ABNORMAL
UROBILINOGEN, URINE: 0.2 E.U./DL
WBC # BLD: 5.3 K/UL (ref 4.8–10.8)

## 2019-02-27 PROCEDURE — G0444 DEPRESSION SCREEN ANNUAL: HCPCS | Performed by: NURSE PRACTITIONER

## 2019-02-27 PROCEDURE — 99214 OFFICE O/P EST MOD 30 MIN: CPT | Performed by: NURSE PRACTITIONER

## 2019-02-27 RX ORDER — IBUPROFEN 200 MG
400 TABLET ORAL DAILY
COMMUNITY

## 2019-02-27 RX ORDER — VITAMIN B COMPLEX
1 CAPSULE ORAL DAILY
COMMUNITY

## 2019-02-27 RX ORDER — ASCORBIC ACID 500 MG
500 TABLET ORAL DAILY
COMMUNITY

## 2019-02-27 RX ORDER — CEFDINIR 300 MG/1
300 CAPSULE ORAL 2 TIMES DAILY
Qty: 20 CAPSULE | Refills: 0 | Status: SHIPPED | OUTPATIENT
Start: 2019-02-27 | End: 2019-03-09

## 2019-02-27 RX ORDER — ESCITALOPRAM OXALATE 10 MG/1
TABLET ORAL
Qty: 45 TABLET | Refills: 5 | Status: SHIPPED | OUTPATIENT
Start: 2019-02-27 | End: 2019-07-01 | Stop reason: SDUPTHER

## 2019-02-27 RX ORDER — AZELASTINE 1 MG/ML
2 SPRAY, METERED NASAL 2 TIMES DAILY
Qty: 1 BOTTLE | Refills: 3 | Status: SHIPPED | OUTPATIENT
Start: 2019-02-27 | End: 2019-07-01 | Stop reason: ALTCHOICE

## 2019-02-27 ASSESSMENT — ENCOUNTER SYMPTOMS
VOMITING: 0
DIARRHEA: 1
COUGH: 1
BACK PAIN: 1

## 2019-02-27 ASSESSMENT — PATIENT HEALTH QUESTIONNAIRE - PHQ9
5. POOR APPETITE OR OVEREATING: 3
4. FEELING TIRED OR HAVING LITTLE ENERGY: 3
10. IF YOU CHECKED OFF ANY PROBLEMS, HOW DIFFICULT HAVE THESE PROBLEMS MADE IT FOR YOU TO DO YOUR WORK, TAKE CARE OF THINGS AT HOME, OR GET ALONG WITH OTHER PEOPLE: 2
SUM OF ALL RESPONSES TO PHQ QUESTIONS 1-9: 22
SUM OF ALL RESPONSES TO PHQ QUESTIONS 1-9: 22
6. FEELING BAD ABOUT YOURSELF - OR THAT YOU ARE A FAILURE OR HAVE LET YOURSELF OR YOUR FAMILY DOWN: 1
SUM OF ALL RESPONSES TO PHQ9 QUESTIONS 1 & 2: 6
9. THOUGHTS THAT YOU WOULD BE BETTER OFF DEAD, OR OF HURTING YOURSELF: 0
8. MOVING OR SPEAKING SO SLOWLY THAT OTHER PEOPLE COULD HAVE NOTICED. OR THE OPPOSITE, BEING SO FIGETY OR RESTLESS THAT YOU HAVE BEEN MOVING AROUND A LOT MORE THAN USUAL: 3
7. TROUBLE CONCENTRATING ON THINGS, SUCH AS READING THE NEWSPAPER OR WATCHING TELEVISION: 3
1. LITTLE INTEREST OR PLEASURE IN DOING THINGS: 3
3. TROUBLE FALLING OR STAYING ASLEEP: 3
2. FEELING DOWN, DEPRESSED OR HOPELESS: 3

## 2019-03-01 DIAGNOSIS — R19.7 INTERMITTENT DIARRHEA: ICD-10-CM

## 2019-03-04 LAB — H PYLORI ANTIGEN STOOL: POSITIVE

## 2019-03-13 ENCOUNTER — OFFICE VISIT (OUTPATIENT)
Dept: FAMILY MEDICINE CLINIC | Age: 64
End: 2019-03-13
Payer: COMMERCIAL

## 2019-03-13 VITALS
SYSTOLIC BLOOD PRESSURE: 136 MMHG | HEIGHT: 62 IN | DIASTOLIC BLOOD PRESSURE: 66 MMHG | BODY MASS INDEX: 23.46 KG/M2 | RESPIRATION RATE: 20 BRPM | OXYGEN SATURATION: 98 % | WEIGHT: 127.5 LBS | HEART RATE: 84 BPM | TEMPERATURE: 98.7 F

## 2019-03-13 DIAGNOSIS — R76.8 HELICOBACTER PYLORI AB+: Primary | ICD-10-CM

## 2019-03-13 DIAGNOSIS — K21.9 GASTROESOPHAGEAL REFLUX DISEASE, ESOPHAGITIS PRESENCE NOT SPECIFIED: ICD-10-CM

## 2019-03-13 DIAGNOSIS — E78.2 MIXED HYPERLIPIDEMIA: ICD-10-CM

## 2019-03-13 PROCEDURE — 99213 OFFICE O/P EST LOW 20 MIN: CPT | Performed by: NURSE PRACTITIONER

## 2019-03-13 RX ORDER — OMEPRAZOLE 20 MG/1
CAPSULE, DELAYED RELEASE ORAL
Qty: 30 CAPSULE | Refills: 5 | Status: SHIPPED | OUTPATIENT
Start: 2019-03-13 | End: 2019-07-01 | Stop reason: SINTOL

## 2019-03-13 RX ORDER — LANSOPRAZOLE, AMOXICILLIN, CLARITHROMYCIN 30-500-500
KIT ORAL
Qty: 1 EACH | Refills: 0 | Status: SHIPPED | OUTPATIENT
Start: 2019-03-13 | End: 2019-07-01 | Stop reason: ALTCHOICE

## 2019-03-13 RX ORDER — EZETIMIBE 10 MG/1
10 TABLET ORAL DAILY
Qty: 30 TABLET | Refills: 5 | Status: SHIPPED | OUTPATIENT
Start: 2019-03-13 | End: 2019-07-01 | Stop reason: SDUPTHER

## 2019-03-13 ASSESSMENT — ENCOUNTER SYMPTOMS
VOMITING: 0
ABDOMINAL PAIN: 0
NAUSEA: 0

## 2019-03-14 ENCOUNTER — TELEPHONE (OUTPATIENT)
Dept: FAMILY MEDICINE CLINIC | Age: 64
End: 2019-03-14

## 2019-03-14 RX ORDER — CLARITHROMYCIN 500 MG/1
500 TABLET, COATED ORAL 2 TIMES DAILY
Qty: 28 TABLET | Refills: 0 | Status: SHIPPED | OUTPATIENT
Start: 2019-03-14 | End: 2019-03-28

## 2019-03-14 RX ORDER — AMOXICILLIN 500 MG/1
1000 CAPSULE ORAL 2 TIMES DAILY
Qty: 56 CAPSULE | Refills: 0 | Status: SHIPPED | OUTPATIENT
Start: 2019-03-14 | End: 2019-03-28

## 2019-07-01 ENCOUNTER — OFFICE VISIT (OUTPATIENT)
Dept: FAMILY MEDICINE CLINIC | Age: 64
End: 2019-07-01
Payer: COMMERCIAL

## 2019-07-01 VITALS
OXYGEN SATURATION: 98 % | WEIGHT: 124 LBS | TEMPERATURE: 97.7 F | DIASTOLIC BLOOD PRESSURE: 64 MMHG | SYSTOLIC BLOOD PRESSURE: 128 MMHG | RESPIRATION RATE: 20 BRPM | HEART RATE: 75 BPM | BODY MASS INDEX: 22.82 KG/M2 | HEIGHT: 62 IN

## 2019-07-01 DIAGNOSIS — E78.2 MIXED HYPERLIPIDEMIA: ICD-10-CM

## 2019-07-01 DIAGNOSIS — R80.9 PROTEINURIA, UNSPECIFIED TYPE: ICD-10-CM

## 2019-07-01 DIAGNOSIS — F41.9 ANXIETY AND DEPRESSION: ICD-10-CM

## 2019-07-01 DIAGNOSIS — F32.A ANXIETY AND DEPRESSION: ICD-10-CM

## 2019-07-01 DIAGNOSIS — E78.2 MIXED HYPERLIPIDEMIA: Primary | ICD-10-CM

## 2019-07-01 DIAGNOSIS — Z12.31 SCREENING MAMMOGRAM, ENCOUNTER FOR: ICD-10-CM

## 2019-07-01 LAB
ALBUMIN SERPL-MCNC: 4.8 G/DL (ref 3.5–5.2)
ALP BLD-CCNC: 113 U/L (ref 35–104)
ALT SERPL-CCNC: 90 U/L (ref 5–33)
ANION GAP SERPL CALCULATED.3IONS-SCNC: 14 MMOL/L (ref 7–19)
AST SERPL-CCNC: 43 U/L (ref 5–32)
BACTERIA: NEGATIVE /HPF
BILIRUB SERPL-MCNC: 0.3 MG/DL (ref 0.2–1.2)
BILIRUBIN URINE: NEGATIVE
BLOOD, URINE: NEGATIVE
BUN BLDV-MCNC: 7 MG/DL (ref 8–23)
CALCIUM SERPL-MCNC: 9.3 MG/DL (ref 8.8–10.2)
CHLORIDE BLD-SCNC: 105 MMOL/L (ref 98–111)
CHOLESTEROL, TOTAL: 263 MG/DL (ref 160–199)
CLARITY: CLEAR
CO2: 24 MMOL/L (ref 22–29)
COLOR: YELLOW
CREAT SERPL-MCNC: <0.5 MG/DL (ref 0.5–0.9)
EPITHELIAL CELLS, UA: 7 /HPF (ref 0–5)
GFR NON-AFRICAN AMERICAN: >60
GLUCOSE BLD-MCNC: 120 MG/DL (ref 74–109)
GLUCOSE URINE: NEGATIVE MG/DL
HDLC SERPL-MCNC: 59 MG/DL (ref 65–121)
HYALINE CASTS: 3 /HPF (ref 0–8)
KETONES, URINE: NEGATIVE MG/DL
LDL CHOLESTEROL CALCULATED: ABNORMAL MG/DL
LDL CHOLESTEROL DIRECT: 116 MG/DL
LEUKOCYTE ESTERASE, URINE: ABNORMAL
NITRITE, URINE: NEGATIVE
PH UA: 6 (ref 5–8)
POTASSIUM SERPL-SCNC: 3.8 MMOL/L (ref 3.5–5)
PROTEIN UA: NEGATIVE MG/DL
RBC UA: 1 /HPF (ref 0–4)
SODIUM BLD-SCNC: 143 MMOL/L (ref 136–145)
SPECIFIC GRAVITY UA: 1.02 (ref 1–1.03)
TOTAL PROTEIN: 7.6 G/DL (ref 6.6–8.7)
TRIGL SERPL-MCNC: 494 MG/DL (ref 0–149)
URINE REFLEX TO CULTURE: YES
UROBILINOGEN, URINE: 0.2 E.U./DL
WBC UA: 2 /HPF (ref 0–5)

## 2019-07-01 PROCEDURE — 99213 OFFICE O/P EST LOW 20 MIN: CPT | Performed by: NURSE PRACTITIONER

## 2019-07-01 RX ORDER — ESCITALOPRAM OXALATE 10 MG/1
TABLET ORAL
Qty: 45 TABLET | Refills: 5 | Status: SHIPPED | OUTPATIENT
Start: 2019-07-01

## 2019-07-01 RX ORDER — SIMVASTATIN 40 MG
40 TABLET ORAL NIGHTLY
Qty: 30 TABLET | Refills: 5 | Status: SHIPPED | OUTPATIENT
Start: 2019-07-01 | End: 2019-07-19 | Stop reason: ALTCHOICE

## 2019-07-01 RX ORDER — EZETIMIBE 10 MG/1
10 TABLET ORAL DAILY
Qty: 30 TABLET | Refills: 5 | Status: SHIPPED | OUTPATIENT
Start: 2019-07-01

## 2019-07-01 ASSESSMENT — ENCOUNTER SYMPTOMS
VOMITING: 0
TROUBLE SWALLOWING: 0
NAUSEA: 0
ABDOMINAL PAIN: 0

## 2019-07-01 NOTE — PROGRESS NOTES
reaction Yes SHERRI Zuñiga   Multiple Vitamins-Minerals (THERAPEUTIC MULTIVITAMIN-MINERALS) tablet Take 1 tablet by mouth daily Yes Historical Provider, MD     Allergies   Allergen Reactions    Peanut-Containing Drug Products Shortness Of Breath    Diclofenac Sodium      Flushing and soa     Past Surgical History:   Procedure Laterality Date    COLONOSCOPY  2016    dr antonio     Family History   Problem Relation Age of Onset    Diabetes Mother     Heart Disease Mother     High Blood Pressure Mother     High Cholesterol Mother     Diabetes Father     High Cholesterol Father     High Blood Pressure Father     Heart Disease Father      Social History     Socioeconomic History    Marital status: Unknown     Spouse name: Not on file    Number of children: Not on file    Years of education: Not on file    Highest education level: Not on file   Occupational History    Not on file   Social Needs    Financial resource strain: Not on file    Food insecurity:     Worry: Not on file     Inability: Not on file    Transportation needs:     Medical: Not on file     Non-medical: Not on file   Tobacco Use    Smoking status: Former Smoker     Packs/day: 0.25     Years: 10.00     Pack years: 2.50     Types: Cigarettes     Last attempt to quit: 10/24/2017     Years since quittin.6    Smokeless tobacco: Never Used   Substance and Sexual Activity    Alcohol use:  Yes     Alcohol/week: 0.0 oz     Comment: occ    Drug use: No    Sexual activity: Not on file   Lifestyle    Physical activity:     Days per week: Not on file     Minutes per session: Not on file    Stress: Not on file   Relationships    Social connections:     Talks on phone: Not on file     Gets together: Not on file     Attends Buddhist service: Not on file     Active member of club or organization: Not on file     Attends meetings of clubs or organizations: Not on file     Relationship status: Not on file    Intimate partner violence: (ZETIA) 10 MG tablet   2. Anxiety and depression F41.9 escitalopram (LEXAPRO) 10 MG tablet    F32.9 Comprehensive Metabolic Panel   3. Screening mammogram, encounter for Z12.31 Mammography screening bilateral   4. Proteinuria, unspecified type R80.9 Urinalysis Reflex to Culture       PLAN:    [place]: Medication Refill (Patient presents for medication refills and follow up on cholesterol medication. Patient is fasting.)  Reminder given verbally for pap  Pt has been given [facility] info sheet  Mammo ordered and pt will take with her to call insurance of preferred location; states receiving a bill from [facility]. Lab today  F/u in approx 6mo and prn. Diagnosis and orders for this visit are above. Please note that this chart was generated using dragon dictationsoftware. Although every effort was made to ensure the accuracy of this automated transcription, some errors in transcription may have occurred.

## 2019-07-03 LAB — URINE CULTURE, ROUTINE: NORMAL

## 2019-07-19 RX ORDER — ROSUVASTATIN CALCIUM 20 MG/1
20 TABLET, COATED ORAL NIGHTLY
Qty: 30 TABLET | Refills: 2 | Status: SHIPPED | OUTPATIENT
Start: 2019-07-19 | End: 2019-10-17 | Stop reason: SDUPTHER

## 2019-09-18 ENCOUNTER — OFFICE VISIT (OUTPATIENT)
Dept: FAMILY MEDICINE CLINIC | Age: 64
End: 2019-09-18
Payer: COMMERCIAL

## 2019-09-18 ENCOUNTER — TELEPHONE (OUTPATIENT)
Dept: FAMILY MEDICINE CLINIC | Age: 64
End: 2019-09-18

## 2019-09-18 VITALS
TEMPERATURE: 98.2 F | RESPIRATION RATE: 20 BRPM | HEART RATE: 94 BPM | SYSTOLIC BLOOD PRESSURE: 132 MMHG | WEIGHT: 126 LBS | DIASTOLIC BLOOD PRESSURE: 86 MMHG | BODY MASS INDEX: 23.05 KG/M2 | OXYGEN SATURATION: 98 %

## 2019-09-18 DIAGNOSIS — H66.92 LEFT ACUTE OTITIS MEDIA: ICD-10-CM

## 2019-09-18 DIAGNOSIS — B00.1 HERPES LABIALIS: ICD-10-CM

## 2019-09-18 DIAGNOSIS — F32.A ANXIETY AND DEPRESSION: ICD-10-CM

## 2019-09-18 DIAGNOSIS — E78.2 MIXED HYPERLIPIDEMIA: ICD-10-CM

## 2019-09-18 DIAGNOSIS — F41.9 ANXIETY AND DEPRESSION: ICD-10-CM

## 2019-09-18 DIAGNOSIS — G47.19 EXCESSIVE DAYTIME SLEEPINESS: Primary | ICD-10-CM

## 2019-09-18 PROCEDURE — 96372 THER/PROPH/DIAG INJ SC/IM: CPT | Performed by: NURSE PRACTITIONER

## 2019-09-18 PROCEDURE — 99214 OFFICE O/P EST MOD 30 MIN: CPT | Performed by: NURSE PRACTITIONER

## 2019-09-18 RX ORDER — DEXAMETHASONE SODIUM PHOSPHATE 10 MG/ML
4 INJECTION INTRAMUSCULAR; INTRAVENOUS ONCE
Status: COMPLETED | OUTPATIENT
Start: 2019-09-18 | End: 2019-09-18

## 2019-09-18 RX ORDER — AMOXICILLIN AND CLAVULANATE POTASSIUM 500; 125 MG/1; MG/1
1 TABLET, FILM COATED ORAL 3 TIMES DAILY
Qty: 30 TABLET | Refills: 0 | Status: SHIPPED | OUTPATIENT
Start: 2019-09-18 | End: 2019-09-23 | Stop reason: SINTOL

## 2019-09-18 RX ORDER — TRIAMCINOLONE ACETONIDE 40 MG/ML
40 INJECTION, SUSPENSION INTRA-ARTICULAR; INTRAMUSCULAR ONCE
Status: COMPLETED | OUTPATIENT
Start: 2019-09-18 | End: 2019-09-18

## 2019-09-18 RX ORDER — ACYCLOVIR 50 MG/G
CREAM TOPICAL
Qty: 1 TUBE | Refills: 1 | Status: SHIPPED | OUTPATIENT
Start: 2019-09-18

## 2019-09-18 RX ADMIN — DEXAMETHASONE SODIUM PHOSPHATE 4 MG: 10 INJECTION INTRAMUSCULAR; INTRAVENOUS at 12:13

## 2019-09-18 RX ADMIN — TRIAMCINOLONE ACETONIDE 40 MG: 40 INJECTION, SUSPENSION INTRA-ARTICULAR; INTRAMUSCULAR at 12:13

## 2019-09-18 ASSESSMENT — ENCOUNTER SYMPTOMS: SHORTNESS OF BREATH: 0

## 2019-09-18 NOTE — TELEPHONE ENCOUNTER
Left message for patient regarding sleep study. PCP is wanting patient to get the sleep study done and as per [facility] patient had refused test in the past. Calling to confirm that patient is willing to go ahead with the test at this time.

## 2019-09-18 NOTE — PROGRESS NOTES
SUBJECTIVE:    Patient ID: Digna Ivan is a59 y.o. female. Digna Ivan is here today for Discuss Medications (Patient presents to discuss medications; follow up on Lexapro and Crestor); Blister (\"cold sore\"); Fatigue; and Tinnitus  . HPI:   HPI     Pt reports having ongoing fatigue. She states that \"I am so tired I just go home and lie down and do not do anything\". Onset was months. Feels fatigue is getting worse. \"I don't even want to get dressed\"--spouse concerned of depression but pt is not concerned of this cause. 2016 overnight pulse ox was done and showed 262 desats and 238 desats and sleep study was entered. Pt does not recall having sleep study done. Pt states that she is tolerating crestor. She has switched from simvastatin. Last levels were high in July. Pt will have a recheck lab in 2-4wks. Pt is taking lexapro 15mg daily. She continues to feel anxiety but is concerned it is also linked to her poor sleep quality. Fever blister to right upper lip. Would like rx medication. Hasn't had outbreak in approx 5+yrs. Left ear pressure x 2wks. H/o fluid to bilat mid ears. No discharge reported. No pain. No tx. No cough or congestion reported. Past Medical History:   Diagnosis Date    Anxiety     Depression     Hip pain, left     Hyperlipidemia      Prior to Visit Medications    Medication Sig Taking?  Authorizing Provider   acyclovir (ZOVIRAX) 5 % CREA Apply 5x/day as needed Yes SHERRI Zuñiga   amoxicillin-clavulanate (AUGMENTIN) 500-125 MG per tablet Take 1 tablet by mouth 3 times daily for 10 days Yes SHERRI Zuñiga   rosuvastatin (CRESTOR) 20 MG tablet Take 1 tablet by mouth nightly Yes SHERRI Zuñiga   aspirin 81 MG tablet Take 81 mg by mouth daily Yes Historical Provider, MD   escitalopram (LEXAPRO) 10 MG tablet 1.5 po daily for anxiety Yes SHERRI Zuñiga   ezetimibe (ZETIA) 10 MG tablet Take 1 tablet by mouth daily Yes SHRERI Celestin

## 2019-09-19 ENCOUNTER — TELEPHONE (OUTPATIENT)
Dept: FAMILY MEDICINE CLINIC | Age: 64
End: 2019-09-19

## 2019-09-20 ENCOUNTER — TELEPHONE (OUTPATIENT)
Dept: FAMILY MEDICINE CLINIC | Age: 64
End: 2019-09-20

## 2019-09-23 ENCOUNTER — TELEPHONE (OUTPATIENT)
Dept: FAMILY MEDICINE CLINIC | Age: 64
End: 2019-09-23

## 2019-09-23 RX ORDER — CEFPROZIL 500 MG/1
500 TABLET, FILM COATED ORAL 2 TIMES DAILY
Qty: 20 TABLET | Refills: 0 | Status: SHIPPED | OUTPATIENT
Start: 2019-09-23 | End: 2019-10-03

## 2019-09-23 NOTE — TELEPHONE ENCOUNTER
Patient called, states she cannot take augmentin, it is causing stomach upset, cramping and fatigue.

## 2019-10-14 ENCOUNTER — LAB REQUISITION (OUTPATIENT)
Dept: LAB | Facility: HOSPITAL | Age: 64
End: 2019-10-14

## 2019-10-14 DIAGNOSIS — Z00.00 ROUTINE GENERAL MEDICAL EXAMINATION AT A HEALTH CARE FACILITY: ICD-10-CM

## 2019-10-14 PROCEDURE — 88305 TISSUE EXAM BY PATHOLOGIST: CPT | Performed by: SURGERY

## 2019-10-16 LAB
CYTO UR: NORMAL
LAB AP CASE REPORT: NORMAL
LAB AP CLINICAL INFORMATION: NORMAL
PATH REPORT.FINAL DX SPEC: NORMAL
PATH REPORT.GROSS SPEC: NORMAL

## 2019-10-17 RX ORDER — ROSUVASTATIN CALCIUM 20 MG/1
20 TABLET, COATED ORAL NIGHTLY
Qty: 30 TABLET | Refills: 2 | Status: SHIPPED | OUTPATIENT
Start: 2019-10-17

## 2019-10-21 DIAGNOSIS — G47.19 EXCESSIVE DAYTIME SLEEPINESS: ICD-10-CM

## 2019-10-21 DIAGNOSIS — F32.A ANXIETY AND DEPRESSION: ICD-10-CM

## 2019-10-21 DIAGNOSIS — E78.2 MIXED HYPERLIPIDEMIA: ICD-10-CM

## 2019-10-21 DIAGNOSIS — F41.9 ANXIETY AND DEPRESSION: ICD-10-CM

## 2019-10-21 LAB
ALBUMIN SERPL-MCNC: 4.8 G/DL (ref 3.5–5.2)
ALP BLD-CCNC: 145 U/L (ref 35–104)
ALT SERPL-CCNC: 214 U/L (ref 5–33)
ANION GAP SERPL CALCULATED.3IONS-SCNC: 19 MMOL/L (ref 7–19)
AST SERPL-CCNC: 68 U/L (ref 5–32)
BASOPHILS ABSOLUTE: 0.1 K/UL (ref 0–0.2)
BASOPHILS RELATIVE PERCENT: 0.8 % (ref 0–1)
BILIRUB SERPL-MCNC: 0.7 MG/DL (ref 0.2–1.2)
BUN BLDV-MCNC: 11 MG/DL (ref 8–23)
CALCIUM SERPL-MCNC: 9.5 MG/DL (ref 8.8–10.2)
CHLORIDE BLD-SCNC: 102 MMOL/L (ref 98–111)
CHOLESTEROL, TOTAL: 259 MG/DL (ref 160–199)
CO2: 20 MMOL/L (ref 22–29)
CREAT SERPL-MCNC: 0.5 MG/DL (ref 0.5–0.9)
EOSINOPHILS ABSOLUTE: 0.1 K/UL (ref 0–0.6)
EOSINOPHILS RELATIVE PERCENT: 1 % (ref 0–5)
FOLATE: >20 NG/ML (ref 4.8–37.3)
GFR NON-AFRICAN AMERICAN: >60
GLUCOSE BLD-MCNC: 106 MG/DL (ref 74–109)
HCT VFR BLD CALC: 45.7 % (ref 37–47)
HDLC SERPL-MCNC: 75 MG/DL (ref 65–121)
HEMOGLOBIN: 15.3 G/DL (ref 12–16)
IMMATURE GRANULOCYTES #: 0 K/UL
IRON: 139 UG/DL (ref 37–145)
LDL CHOLESTEROL CALCULATED: ABNORMAL MG/DL
LDL CHOLESTEROL DIRECT: 100 MG/DL
LYMPHOCYTES ABSOLUTE: 1.9 K/UL (ref 1.1–4.5)
LYMPHOCYTES RELATIVE PERCENT: 30.4 % (ref 20–40)
MCH RBC QN AUTO: 32.6 PG (ref 27–31)
MCHC RBC AUTO-ENTMCNC: 33.5 G/DL (ref 33–37)
MCV RBC AUTO: 97.2 FL (ref 81–99)
MONOCYTES ABSOLUTE: 0.5 K/UL (ref 0–0.9)
MONOCYTES RELATIVE PERCENT: 7.9 % (ref 0–10)
NEUTROPHILS ABSOLUTE: 3.7 K/UL (ref 1.5–7.5)
NEUTROPHILS RELATIVE PERCENT: 59.7 % (ref 50–65)
PDW BLD-RTO: 13 % (ref 11.5–14.5)
PLATELET # BLD: 250 K/UL (ref 130–400)
PMV BLD AUTO: 9.6 FL (ref 9.4–12.3)
POTASSIUM SERPL-SCNC: 3.7 MMOL/L (ref 3.5–5)
RBC # BLD: 4.7 M/UL (ref 4.2–5.4)
RETICULOCYTE ABSOLUTE COUNT: 0.07 M/UL (ref 0.03–0.12)
RETICULOCYTE COUNT PCT: 1.5 % (ref 0.5–1.5)
SODIUM BLD-SCNC: 141 MMOL/L (ref 136–145)
T4 FREE: 1.1 NG/DL (ref 0.9–1.7)
TOTAL IRON BINDING CAPACITY: 408 UG/DL (ref 250–400)
TOTAL PROTEIN: 7.8 G/DL (ref 6.6–8.7)
TRIGL SERPL-MCNC: 524 MG/DL (ref 0–149)
TSH SERPL DL<=0.05 MIU/L-ACNC: 1.68 UIU/ML (ref 0.27–4.2)
VITAMIN B-12: 442 PG/ML (ref 211–946)
WBC # BLD: 6.2 K/UL (ref 4.8–10.8)

## 2019-10-31 DIAGNOSIS — R74.8 ELEVATED LIVER ENZYMES: Primary | ICD-10-CM

## 2019-11-06 ENCOUNTER — HOSPITAL ENCOUNTER (OUTPATIENT)
Dept: GENERAL RADIOLOGY | Age: 64
Discharge: HOME | End: 2019-11-06
Payer: COMMERCIAL

## 2019-11-06 DIAGNOSIS — R74.8 ELEVATED LIVER ENZYMES: ICD-10-CM

## 2019-11-06 PROCEDURE — 76705 ECHO EXAM OF ABDOMEN: CPT

## 2019-11-14 DIAGNOSIS — K82.8 GALLBLADDER SLUDGE: Primary | ICD-10-CM

## 2019-11-19 DIAGNOSIS — R74.8 ELEVATED LIVER ENZYMES: ICD-10-CM

## 2019-11-19 LAB
ALBUMIN SERPL-MCNC: 4.5 G/DL (ref 3.5–5.2)
ALP BLD-CCNC: 106 U/L (ref 35–104)
ALT SERPL-CCNC: 35 U/L (ref 5–33)
AST SERPL-CCNC: 28 U/L (ref 5–32)
BILIRUB SERPL-MCNC: 0.4 MG/DL (ref 0.2–1.2)
BILIRUBIN DIRECT: 0.1 MG/DL (ref 0–0.3)
BILIRUBIN, INDIRECT: 0.3 MG/DL (ref 0.1–1)
TOTAL PROTEIN: 7.3 G/DL (ref 6.6–8.7)

## 2019-11-27 ENCOUNTER — TELEPHONE (OUTPATIENT)
Dept: FAMILY MEDICINE CLINIC | Age: 64
End: 2019-11-27

## 2019-12-02 ENCOUNTER — HOSPITAL ENCOUNTER (OUTPATIENT)
Dept: NUCLEAR MEDICINE | Age: 64
Discharge: HOME | End: 2019-12-04
Payer: COMMERCIAL

## 2019-12-02 DIAGNOSIS — K82.8 GALLBLADDER SLUDGE: ICD-10-CM

## 2019-12-02 PROCEDURE — A9537 TC99M MEBROFENIN: HCPCS | Performed by: NURSE PRACTITIONER

## 2019-12-02 PROCEDURE — 3430000000 HC RX DIAGNOSTIC RADIOPHARMACEUTICAL: Performed by: NURSE PRACTITIONER

## 2019-12-02 PROCEDURE — 78227 HEPATOBIL SYST IMAGE W/DRUG: CPT

## 2019-12-02 RX ADMIN — Medication 5 MILLICURIE: at 13:59

## 2019-12-10 DIAGNOSIS — R74.8 ELEVATED LIVER ENZYMES: Primary | ICD-10-CM

## 2020-01-14 ENCOUNTER — OFFICE VISIT (OUTPATIENT)
Dept: INTERNAL MEDICINE | Facility: CLINIC | Age: 65
End: 2020-01-14

## 2020-01-14 VITALS
BODY MASS INDEX: 23.63 KG/M2 | WEIGHT: 133.38 LBS | DIASTOLIC BLOOD PRESSURE: 76 MMHG | HEART RATE: 92 BPM | SYSTOLIC BLOOD PRESSURE: 130 MMHG | OXYGEN SATURATION: 98 % | TEMPERATURE: 97.6 F | RESPIRATION RATE: 18 BRPM | HEIGHT: 63 IN

## 2020-01-14 DIAGNOSIS — R79.89 ELEVATED LFTS: ICD-10-CM

## 2020-01-14 DIAGNOSIS — Z84.0 FAMILY HISTORY OF LUPUS ERYTHEMATOSUS: ICD-10-CM

## 2020-01-14 DIAGNOSIS — F41.9 ANXIETY: ICD-10-CM

## 2020-01-14 DIAGNOSIS — E78.2 MIXED HYPERLIPIDEMIA: ICD-10-CM

## 2020-01-14 DIAGNOSIS — R00.2 PALPITATIONS: ICD-10-CM

## 2020-01-14 DIAGNOSIS — Z87.898 HISTORY OF ALCOHOL CONSUMPTION: ICD-10-CM

## 2020-01-14 DIAGNOSIS — R53.83 FATIGUE, UNSPECIFIED TYPE: Primary | ICD-10-CM

## 2020-01-14 PROBLEM — F32.A DEPRESSION: Status: ACTIVE | Noted: 2020-01-14

## 2020-01-14 PROBLEM — R25.2 SPASM: Status: ACTIVE | Noted: 2020-01-14

## 2020-01-14 PROBLEM — M19.019 ARTHRITIS OF SHOULDER: Status: ACTIVE | Noted: 2020-01-14

## 2020-01-14 PROBLEM — M25.419: Status: ACTIVE | Noted: 2020-01-14

## 2020-01-14 PROBLEM — E78.5 HYPERLIPIDEMIA: Status: ACTIVE | Noted: 2020-01-14

## 2020-01-14 PROBLEM — R05.3 CHRONIC COUGH: Status: ACTIVE | Noted: 2020-01-14

## 2020-01-14 PROBLEM — M25.552 HIP PAIN, LEFT: Status: ACTIVE | Noted: 2020-01-14

## 2020-01-14 PROBLEM — M25.40 JOINT SWELLING: Status: ACTIVE | Noted: 2020-01-14

## 2020-01-14 PROCEDURE — 99204 OFFICE O/P NEW MOD 45 MIN: CPT | Performed by: FAMILY MEDICINE

## 2020-01-14 RX ORDER — PAROXETINE HYDROCHLORIDE 20 MG/1
20 TABLET, FILM COATED ORAL EVERY MORNING
Qty: 30 TABLET | Refills: 3 | Status: SHIPPED | OUTPATIENT
Start: 2020-01-14 | End: 2020-05-19

## 2020-01-14 RX ORDER — ESCITALOPRAM OXALATE 10 MG/1
10 TABLET ORAL DAILY
COMMUNITY
Start: 2019-07-01 | End: 2020-01-14 | Stop reason: ALTCHOICE

## 2020-01-14 NOTE — PROGRESS NOTES
Subjective     Chief Complaint   Patient presents with   • Establish Care       History of Present Illness  Onset of fatigue symptoms about five years ago.  Sister  3 years ago to day.  Is trying to start walking  Getting a fast irregular heart rate on occasion, about 2 x per month.  Patient has resumed smoking within the last 6 months.  She also drinks little more than a bottle of wine per night.  She had been out of her Lexapro for approximately 2 to 3 weeks.  She really does not notice that it is any difference being off of it.  Patient's PMR from outside medical facility reviewed and noted.    Review of Systems     Otherwise complete ROS reviewed and negative except as mentioned in the HPI.    Past Medical History:   Past Medical History:   Diagnosis Date   • Anxiety    • Depression    • Hyperlipidemia    • Liver enzyme elevation    • Sleep apnea      Past Surgical History:History reviewed. No pertinent surgical history.  Social History:  reports that she has been smoking cigarettes. She has been smoking about 0.50 packs per day. She has never used smokeless tobacco. She reports that she drinks about 10.0 standard drinks of alcohol per week. She reports that she does not use drugs.    Family History: family history includes ADD / ADHD in her son; Arthritis in her mother and sister; Cancer in her mother and sister; Hyperlipidemia in her mother; Lupus in her brother, maternal aunt, and sister.       Allergies:  Allergies   Allergen Reactions   • Amoxicillin Diarrhea, Nausea Only and Other (See Comments)     Stomach cramping and fatigue   • Peanut-Containing Drug Products Shortness Of Breath   • Diclofenac Sodium Unknown - High Severity     Flushing and soa     Medications:  Prior to Admission medications    Medication Sig Start Date End Date Taking? Authorizing Provider   escitalopram (LEXAPRO) 10 MG tablet Take 10 mg by mouth Daily. Take 1 & 1/2 by mouth daily for Anxiety 19  Yes Provider,  "MD Moshe   Currently is not taking    Objective     Vital Signs: /76 (BP Location: Left arm, Patient Position: Sitting, Cuff Size: Adult)   Pulse 92   Temp 97.6 °F (36.4 °C) (Oral)   Resp 18   Ht 160 cm (63\")   Wt 60.5 kg (133 lb 6 oz)   SpO2 98%   Breastfeeding No   BMI 23.63 kg/m²   Physical Exam   Constitutional: She is oriented to person, place, and time. She appears well-developed and well-nourished.   HENT:   Head: Normocephalic and atraumatic.   Right Ear: External ear normal.   Left Ear: External ear normal.   Nose: Nose normal.   Mouth/Throat: Oropharynx is clear and moist.   Eyes: Pupils are equal, round, and reactive to light. Conjunctivae and EOM are normal.   Neck: Normal range of motion. Neck supple. No JVD present.   Cardiovascular: Normal rate, regular rhythm, normal heart sounds and intact distal pulses. Exam reveals no gallop and no friction rub.   No murmur heard.  Pulmonary/Chest: Effort normal and breath sounds normal. No respiratory distress.   Abdominal: Soft. Bowel sounds are normal. She exhibits no distension.   Musculoskeletal: Normal range of motion. She exhibits no edema or deformity.   Neurological: She is alert and oriented to person, place, and time.   Skin: Skin is warm and dry.   Psychiatric: She has a normal mood and affect. Her behavior is normal.   Nursing note and vitals reviewed.            Results Reviewed:  Glucose   Date Value Ref Range Status   10/21/2019 106 74 - 109 mg/dL Final     BUN   Date Value Ref Range Status   10/21/2019 11 8 - 23 mg/dL Final     Creatinine   Date Value Ref Range Status   10/21/2019 0.5 0.5 - 0.9 mg/dL Final     Sodium   Date Value Ref Range Status   10/21/2019 141 136 - 145 mmol/L Final     Potassium   Date Value Ref Range Status   10/21/2019 3.7 3.5 - 5.0 mmol/L Final     Chloride   Date Value Ref Range Status   10/21/2019 102 98 - 111 mmol/L Final     CO2   Date Value Ref Range Status   10/21/2019 20 (L) 22 - 29 mmol/L Final   "     Calcium   Date Value Ref Range Status   10/21/2019 9.5 8.8 - 10.2 mg/dL Final     ALT (SGPT)   Date Value Ref Range Status   11/19/2019 35 (H) 5 - 33 U/L Final     AST (SGOT)   Date Value Ref Range Status   11/19/2019 28 5 - 32 U/L Final     WBC   Date Value Ref Range Status   10/21/2019 6.2 4.8 - 10.8 K/uL Final     Hematocrit   Date Value Ref Range Status   10/21/2019 45.7 37.0 - 47.0 % Final     Platelets   Date Value Ref Range Status   10/21/2019 250 130 - 400 K/uL Final     Triglycerides   Date Value Ref Range Status   10/21/2019 524 (H) 0 - 149 mg/dL Final     HDL Cholesterol   Date Value Ref Range Status   10/21/2019 75 65 - 121 mg/dL Final     Comment:     VALUES>60 MG/DL ARE ASSOCIATED WITH A DECREASED RISK OF  ATHEROSCLEROTIC CARDIOVASCULAR DISEASE     LDL Cholesterol    Date Value Ref Range Status   10/21/2019 see below <100 mg/dL Final     Comment:     When the triglyceride is >400 mg/dL the calculated LDL and  VLDL are not valid.  <100 MG/DL=OPITIMAL    100-129 MG/DL=DESIRABLE    130-159 MG/DL BORDERLINE=INCREASED RISK OF ATHEROSCLEROTIC  CARDIOVASCULAR DISEASE    > OR = 160 MG/DL=ASSOCIATED WITH AN INCREASE RISK OF  ATHEROSCLEROTIC CARDIOVASCULAR DISEASE         Assessment / Plan     Assessment/Plan:  1. Fatigue, unspecified type    - RIVAS  - C-reactive protein    2. Mixed hyperlipidemia    - Lipid panel    3. Elevated LFTs    - Comprehensive metabolic panel    4. Family history of lupus erythematosus  Check RIVAS    5. Palpitations  Patient to keep track of episodes.  She potentially will need to ZIO Patch.    6. History of alcohol consumption  Recommend reduction of the amount of alcohol she consumes.    7.  Tobacco abuseuse    8.  Anxiety, generalized  Paxil 20 mg daily    Return in about 2 weeks (around 1/28/2020). unless patient needs to be seen sooner or acute issues arise.    Code Status: full    I have discussed the patient results/orders and and plan/recommendation with them at today's visit.       Jessica Fierro, DO   01/14/2020

## 2020-01-15 LAB
ALBUMIN SERPL-MCNC: 4.7 G/DL (ref 3.6–4.8)
ALBUMIN/GLOB SERPL: 2 {RATIO} (ref 1.2–2.2)
ALP SERPL-CCNC: 87 IU/L (ref 39–117)
ALT SERPL-CCNC: 38 IU/L (ref 0–32)
ANA SER QL: NEGATIVE
AST SERPL-CCNC: 26 IU/L (ref 0–40)
BILIRUB SERPL-MCNC: 0.3 MG/DL (ref 0–1.2)
BUN SERPL-MCNC: 6 MG/DL (ref 8–27)
BUN/CREAT SERPL: 11 (ref 12–28)
CALCIUM SERPL-MCNC: 9.3 MG/DL (ref 8.7–10.3)
CHLORIDE SERPL-SCNC: 101 MMOL/L (ref 96–106)
CHOLEST SERPL-MCNC: 338 MG/DL (ref 100–199)
CO2 SERPL-SCNC: 22 MMOL/L (ref 20–29)
CREAT SERPL-MCNC: 0.53 MG/DL (ref 0.57–1)
CRP SERPL-MCNC: 2 MG/L (ref 0–10)
GLOBULIN SER CALC-MCNC: 2.4 G/DL (ref 1.5–4.5)
GLUCOSE SERPL-MCNC: 107 MG/DL (ref 65–99)
HDLC SERPL-MCNC: 41 MG/DL
LDLC SERPL CALC-MCNC: ABNORMAL MG/DL (ref 0–99)
POTASSIUM SERPL-SCNC: 4 MMOL/L (ref 3.5–5.2)
PROT SERPL-MCNC: 7.1 G/DL (ref 6–8.5)
SODIUM SERPL-SCNC: 139 MMOL/L (ref 134–144)
TRIGL SERPL-MCNC: 1060 MG/DL (ref 0–149)
VLDLC SERPL CALC-MCNC: ABNORMAL MG/DL (ref 5–40)

## 2020-01-17 DIAGNOSIS — E78.00 HIGH CHOLESTEROL: ICD-10-CM

## 2020-01-17 DIAGNOSIS — E78.1 HIGH TRIGLYCERIDES: Primary | ICD-10-CM

## 2020-01-17 RX ORDER — FENOFIBRATE 145 MG/1
145 TABLET, COATED ORAL DAILY
Qty: 30 TABLET | Refills: 3 | Status: SHIPPED | OUTPATIENT
Start: 2020-01-17 | End: 2020-05-19

## 2020-01-17 RX ORDER — ATORVASTATIN CALCIUM 20 MG/1
20 TABLET, FILM COATED ORAL DAILY
Qty: 30 TABLET | Refills: 3 | Status: SHIPPED | OUTPATIENT
Start: 2020-01-17 | End: 2020-05-14 | Stop reason: SDUPTHER

## 2020-01-17 NOTE — TELEPHONE ENCOUNTER
----- Message from Jessica Fierro DO sent at 1/15/2020  6:08 PM CST -----  cholesterol and triglycerides are bad.  Needs to be treated.    lipitor 20 mg daily  Fenofibrate 145 mg daily  Lab  cmp in 4 weeks

## 2020-01-28 ENCOUNTER — OFFICE VISIT (OUTPATIENT)
Dept: INTERNAL MEDICINE | Facility: CLINIC | Age: 65
End: 2020-01-28

## 2020-01-28 VITALS
TEMPERATURE: 97.7 F | OXYGEN SATURATION: 98 % | HEIGHT: 63 IN | WEIGHT: 132.38 LBS | SYSTOLIC BLOOD PRESSURE: 142 MMHG | DIASTOLIC BLOOD PRESSURE: 74 MMHG | HEART RATE: 89 BPM | RESPIRATION RATE: 18 BRPM | BODY MASS INDEX: 23.46 KG/M2

## 2020-01-28 DIAGNOSIS — F41.9 ANXIETY: ICD-10-CM

## 2020-01-28 DIAGNOSIS — Z79.899 HIGH RISK MEDICATION USE: ICD-10-CM

## 2020-01-28 DIAGNOSIS — E78.2 MIXED HYPERLIPIDEMIA: Primary | ICD-10-CM

## 2020-01-28 DIAGNOSIS — F34.1 DYSTHYMIA: ICD-10-CM

## 2020-01-28 PROCEDURE — 99213 OFFICE O/P EST LOW 20 MIN: CPT | Performed by: FAMILY MEDICINE

## 2020-01-28 NOTE — PROGRESS NOTES
Subjective     Chief Complaint   Patient presents with   • Follow-up     Fatigue and Hyperlipidemia, pt states she feels more entergetic       History of Present Illness  Patient notes she is feeling much better than on previous visit.  She has more energy.  Her mood is improved.  She is drinking less wine.  Apparently her  is also helping to encourage her to drink less wine as he saw her my chart space.    Patient's PMR from outside medical facility reviewed and noted.    Review of Systems     Otherwise complete ROS reviewed and negative except as mentioned in the HPI.    Past Medical History:   Past Medical History:   Diagnosis Date   • Anxiety    • Depression    • Hyperlipidemia    • Liver enzyme elevation    • Sleep apnea      Past Surgical History:History reviewed. No pertinent surgical history.  Social History:  reports that she has been smoking cigarettes. She has been smoking about 0.50 packs per day. She has never used smokeless tobacco. She reports that she drinks about 10.0 standard drinks of alcohol per week. She reports that she does not use drugs.    Family History: family history includes ADD / ADHD in her son; Arthritis in her mother and sister; Cancer in her mother and sister; Hyperlipidemia in her mother; Lupus in her brother, maternal aunt, and sister.       Allergies:  Allergies   Allergen Reactions   • Amoxicillin Diarrhea, Nausea Only and Other (See Comments)     Stomach cramping and fatigue   • Peanut-Containing Drug Products Shortness Of Breath   • Diclofenac Sodium Unknown - High Severity     Flushing and soa     Medications:  Prior to Admission medications    Medication Sig Start Date End Date Taking? Authorizing Provider   atorvastatin (LIPITOR) 20 MG tablet Take 1 tablet by mouth Daily. 1/17/20  Yes Jessica Fierro DO   fenofibrate (TRICOR) 145 MG tablet Take 1 tablet by mouth Daily. 1/17/20  Yes Jessica Fierro DO   PARoxetine (PAXIL) 20 MG tablet Take 1 tablet  "by mouth Every Morning. 1/14/20  Yes SriJessica DO       Objective     Vital Signs: /74 (BP Location: Left arm, Patient Position: Sitting, Cuff Size: Adult)   Pulse 89   Temp 97.7 °F (36.5 °C) (Oral)   Resp 18   Ht 160 cm (62.99\")   Wt 60 kg (132 lb 6 oz)   SpO2 98%   Breastfeeding No   BMI 23.46 kg/m²   Physical Exam   Constitutional: She is oriented to person, place, and time. She appears well-developed and well-nourished.   HENT:   Head: Normocephalic and atraumatic.   Right Ear: External ear normal.   Left Ear: External ear normal.   Nose: Nose normal.   Mouth/Throat: Oropharynx is clear and moist.   Eyes: Pupils are equal, round, and reactive to light. Conjunctivae and EOM are normal. Right eye exhibits no discharge. Left eye exhibits no discharge. No scleral icterus.   Neck: Normal range of motion. Neck supple. No JVD present. No tracheal deviation present. No thyromegaly present.   Cardiovascular: Normal rate, regular rhythm, normal heart sounds and intact distal pulses. Exam reveals no gallop and no friction rub.   No murmur heard.  Pulmonary/Chest: Effort normal and breath sounds normal.   Abdominal: Soft. Bowel sounds are normal. She exhibits no distension. There is no tenderness.   Musculoskeletal: Normal range of motion. She exhibits no edema.   Lymphadenopathy:     She has no cervical adenopathy.   Neurological: She is alert and oriented to person, place, and time. No cranial nerve deficit. Coordination normal.   Skin: Skin is warm and dry. Capillary refill takes less than 2 seconds.   Psychiatric: She has a normal mood and affect. Her behavior is normal.   Nursing note and vitals reviewed.          Results Reviewed:  Glucose   Date Value Ref Range Status   10/21/2019 106 74 - 109 mg/dL Final     BUN   Date Value Ref Range Status   01/14/2020 6 (L) 8 - 27 mg/dL Final   10/21/2019 11 8 - 23 mg/dL Final     Creatinine   Date Value Ref Range Status   01/14/2020 0.53 (L) 0.57 - 1.00 " mg/dL Final   10/21/2019 0.5 0.5 - 0.9 mg/dL Final     Sodium   Date Value Ref Range Status   01/14/2020 139 134 - 144 mmol/L Final   10/21/2019 141 136 - 145 mmol/L Final     Potassium   Date Value Ref Range Status   01/14/2020 4.0 3.5 - 5.2 mmol/L Final   10/21/2019 3.7 3.5 - 5.0 mmol/L Final     Chloride   Date Value Ref Range Status   01/14/2020 101 96 - 106 mmol/L Final   10/21/2019 102 98 - 111 mmol/L Final     CO2   Date Value Ref Range Status   10/21/2019 20 (L) 22 - 29 mmol/L Final     Total CO2   Date Value Ref Range Status   01/14/2020 22 20 - 29 mmol/L Final     Calcium   Date Value Ref Range Status   01/14/2020 9.3 8.7 - 10.3 mg/dL Final   10/21/2019 9.5 8.8 - 10.2 mg/dL Final     ALT (SGPT)   Date Value Ref Range Status   01/14/2020 38 (H) 0 - 32 IU/L Final   11/19/2019 35 (H) 5 - 33 U/L Final     AST (SGOT)   Date Value Ref Range Status   01/14/2020 26 0 - 40 IU/L Final   11/19/2019 28 5 - 32 U/L Final     WBC   Date Value Ref Range Status   10/21/2019 6.2 4.8 - 10.8 K/uL Final     Hematocrit   Date Value Ref Range Status   10/21/2019 45.7 37.0 - 47.0 % Final     Platelets   Date Value Ref Range Status   10/21/2019 250 130 - 400 K/uL Final     Triglycerides   Date Value Ref Range Status   01/14/2020 1,060 (H) 0 - 149 mg/dL Final     Comment:     Results confirmed on  dilution.     10/21/2019 524 (H) 0 - 149 mg/dL Final     HDL Cholesterol   Date Value Ref Range Status   01/14/2020 41 >39 mg/dL Final   10/21/2019 75 65 - 121 mg/dL Final     Comment:     VALUES>60 MG/DL ARE ASSOCIATED WITH A DECREASED RISK OF  ATHEROSCLEROTIC CARDIOVASCULAR DISEASE     LDL Cholesterol    Date Value Ref Range Status   01/14/2020 Comment 0 - 99 mg/dL Final     Comment:     Triglyceride result indicated is too high for an accurate LDL  cholesterol estimation.     10/21/2019 see below <100 mg/dL Final     Comment:     When the triglyceride is >400 mg/dL the calculated LDL and  VLDL are not valid.  <100  MG/DL=OPITIMAL    100-129 MG/DL=DESIRABLE    130-159 MG/DL BORDERLINE=INCREASED RISK OF ATHEROSCLEROTIC  CARDIOVASCULAR DISEASE    > OR = 160 MG/DL=ASSOCIATED WITH AN INCREASE RISK OF  ATHEROSCLEROTIC CARDIOVASCULAR DISEASE         Assessment / Plan     Assessment/Plan:  1. Mixed hyperlipidemia    - Comprehensive metabolic panel; Future  Lipid panel  Low-cholesterol diet daily exercise walking    2. High risk medication use    - Comprehensive metabolic panel; Future    3. Anxiety  Continue Paxil    4. Dysthymia  Continue Paxil        Return in about 3 months (around 4/28/2020). unless patient needs to be seen sooner or acute issues arise.        I have discussed the patient results/orders and and plan/recommendation with them at today's visit.      Jessica Fierro, DO   01/28/2020

## 2020-02-18 ENCOUNTER — CLINICAL SUPPORT (OUTPATIENT)
Dept: INTERNAL MEDICINE | Facility: CLINIC | Age: 65
End: 2020-02-18

## 2020-02-18 DIAGNOSIS — E78.00 HIGH CHOLESTEROL: ICD-10-CM

## 2020-02-18 DIAGNOSIS — E78.1 HIGH TRIGLYCERIDES: ICD-10-CM

## 2020-02-18 PROCEDURE — 36415 COLL VENOUS BLD VENIPUNCTURE: CPT | Performed by: FAMILY MEDICINE

## 2020-02-19 LAB
ALBUMIN SERPL-MCNC: 4.6 G/DL (ref 3.8–4.8)
ALBUMIN/GLOB SERPL: 1.9 {RATIO} (ref 1.2–2.2)
ALP SERPL-CCNC: 81 IU/L (ref 39–117)
ALT SERPL-CCNC: 29 IU/L (ref 0–32)
AST SERPL-CCNC: 22 IU/L (ref 0–40)
BILIRUB SERPL-MCNC: 0.3 MG/DL (ref 0–1.2)
BUN SERPL-MCNC: 13 MG/DL (ref 8–27)
BUN/CREAT SERPL: 20 (ref 12–28)
CALCIUM SERPL-MCNC: 9.5 MG/DL (ref 8.7–10.3)
CHLORIDE SERPL-SCNC: 102 MMOL/L (ref 96–106)
CO2 SERPL-SCNC: 22 MMOL/L (ref 20–29)
CREAT SERPL-MCNC: 0.66 MG/DL (ref 0.57–1)
GLOBULIN SER CALC-MCNC: 2.4 G/DL (ref 1.5–4.5)
GLUCOSE SERPL-MCNC: 102 MG/DL (ref 65–99)
POTASSIUM SERPL-SCNC: 4 MMOL/L (ref 3.5–5.2)
PROT SERPL-MCNC: 7 G/DL (ref 6–8.5)
SODIUM SERPL-SCNC: 142 MMOL/L (ref 134–144)

## 2020-02-22 ENCOUNTER — TELEPHONE (OUTPATIENT)
Dept: INTERNAL MEDICINE | Facility: CLINIC | Age: 65
End: 2020-02-22

## 2020-02-22 NOTE — TELEPHONE ENCOUNTER
----- Message from Jessica Fierro DO sent at 2/21/2020  9:21 AM CST -----  Chemistry is essentially normal.  The blood sugar is 102.  Which is 3 points above normal.  Recheck in approximately 3 months.

## 2020-02-25 ENCOUNTER — TELEPHONE (OUTPATIENT)
Dept: INTERNAL MEDICINE | Facility: CLINIC | Age: 65
End: 2020-02-25

## 2020-02-25 NOTE — TELEPHONE ENCOUNTER
Pt had called back this morning, I returned her call and went over her lab results. Pt voiced understanding with no questions or concerns at this time.

## 2020-05-08 ENCOUNTER — OFFICE VISIT (OUTPATIENT)
Dept: INTERNAL MEDICINE | Facility: CLINIC | Age: 65
End: 2020-05-08

## 2020-05-08 VITALS
HEART RATE: 83 BPM | WEIGHT: 138 LBS | HEIGHT: 63 IN | DIASTOLIC BLOOD PRESSURE: 85 MMHG | BODY MASS INDEX: 24.45 KG/M2 | SYSTOLIC BLOOD PRESSURE: 156 MMHG | OXYGEN SATURATION: 98 % | TEMPERATURE: 97.4 F

## 2020-05-08 DIAGNOSIS — M94.0 ACUTE COSTOCHONDRITIS: Primary | ICD-10-CM

## 2020-05-08 PROCEDURE — 99213 OFFICE O/P EST LOW 20 MIN: CPT | Performed by: FAMILY MEDICINE

## 2020-05-08 PROCEDURE — 96372 THER/PROPH/DIAG INJ SC/IM: CPT | Performed by: FAMILY MEDICINE

## 2020-05-08 RX ORDER — METHYLPREDNISOLONE 4 MG/1
TABLET ORAL
Qty: 1 EACH | Refills: 0 | Status: SHIPPED | OUTPATIENT
Start: 2020-05-08 | End: 2020-05-20

## 2020-05-08 RX ORDER — ASCORBIC ACID 500 MG
500 TABLET ORAL DAILY
COMMUNITY
End: 2022-12-16

## 2020-05-08 RX ORDER — M-VIT,TX,IRON,MINS/CALC/FOLIC 27MG-0.4MG
1 TABLET ORAL DAILY
COMMUNITY
End: 2022-12-16

## 2020-05-08 RX ORDER — VITAMIN B COMPLEX
1 CAPSULE ORAL DAILY
COMMUNITY

## 2020-05-08 RX ORDER — TRIAMCINOLONE ACETONIDE 40 MG/ML
40 INJECTION, SUSPENSION INTRA-ARTICULAR; INTRAMUSCULAR ONCE
Status: COMPLETED | OUTPATIENT
Start: 2020-05-08 | End: 2020-05-08

## 2020-05-08 RX ADMIN — TRIAMCINOLONE ACETONIDE 40 MG: 40 INJECTION, SUSPENSION INTRA-ARTICULAR; INTRAMUSCULAR at 14:34

## 2020-05-08 NOTE — PROGRESS NOTES
Subjective     Chief Complaint   Patient presents with   • Flank Pain     right breast down ribcage       History of Present Illness  3 weeks pan in right lateral chest wall.   Movement hurts.   No fever or cough.  No trauma.  No nausea or vomiting.   Has been using some horse liniment and oil on it but does not help.     Patient's PMR from outside medical facility reviewed and noted.    Review of Systems     Otherwise complete ROS reviewed and negative except as mentioned in the HPI.    Past Medical History:   Past Medical History:   Diagnosis Date   • Anxiety    • Depression    • Hyperlipidemia    • Liver enzyme elevation    • Sleep apnea      Past Surgical History:History reviewed. No pertinent surgical history.  Social History:  reports that she has quit smoking. Her smoking use included cigarettes. She smoked 0.50 packs per day. She has never used smokeless tobacco. She reports that she drinks about 10.0 standard drinks of alcohol per week. She reports that she does not use drugs.    Family History: family history includes ADD / ADHD in her son; Arthritis in her mother and sister; Cancer in her mother and sister; Hyperlipidemia in her mother; Lupus in her brother, maternal aunt, and sister.       Allergies:  Allergies   Allergen Reactions   • Amoxicillin Diarrhea, Nausea Only and Other (See Comments)     Stomach cramping and fatigue   • Peanut-Containing Drug Products Shortness Of Breath   • Diclofenac Sodium Unknown - High Severity     Flushing and soa     Medications:  Prior to Admission medications    Medication Sig Start Date End Date Taking? Authorizing Provider   aspirin 81 MG tablet Take 81 mg by mouth Daily.   Yes ProviderMoshe MD   atorvastatin (LIPITOR) 20 MG tablet Take 1 tablet by mouth Daily. 1/17/20  Yes Jessica Fierro, DO   b complex vitamins capsule Take 1 capsule by mouth Daily.   Yes ProviderMoshe MD   fenofibrate (TRICOR) 145 MG tablet Take 1 tablet by mouth  "Daily. 1/17/20  Yes Jessica Fierro,    PARoxetine (PAXIL) 20 MG tablet Take 1 tablet by mouth Every Morning. 1/14/20  Yes Jessica Fierro, DO   therapeutic multivitamin-minerals (THERAGRAN-M) tablet Take 1 tablet by mouth Daily.   Yes ProviderMoshe MD   vitamin C (ASCORBIC ACID) 500 MG tablet Take 500 mg by mouth Daily.   Yes Provider, MD Moshe       Objective     Vital Signs: /85 (BP Location: Left arm, Patient Position: Sitting, Cuff Size: Adult)   Pulse 83   Temp 97.4 °F (36.3 °C) (Temporal)   Ht 160 cm (62.99\")   Wt 62.6 kg (138 lb)   SpO2 98%   Breastfeeding No   BMI 24.45 kg/m²   Physical Exam   Constitutional: She appears well-developed and well-nourished.   HENT:   Head: Normocephalic and atraumatic.   Right Ear: External ear normal.   Left Ear: External ear normal.   Eyes: Pupils are equal, round, and reactive to light. Conjunctivae and EOM are normal.   Neck: Normal range of motion. Neck supple. No JVD present.   Cardiovascular: Normal rate, regular rhythm, normal heart sounds and intact distal pulses. Exam reveals no gallop and no friction rub.   No murmur heard.  Pulmonary/Chest: Effort normal and breath sounds normal.   Abdominal: Soft. Bowel sounds are normal.   Musculoskeletal: Normal range of motion. She exhibits no edema.   Tenderness with palpation over the right lateral rib cage with increased ttp over the bone/cartliage joint.    Neurological: She is alert.   Skin: Skin is warm and dry.   Psychiatric: She has a normal mood and affect. Her behavior is normal.   Nursing note and vitals reviewed.        Results Reviewed:  Glucose   Date Value Ref Range Status   10/21/2019 106 74 - 109 mg/dL Final     BUN   Date Value Ref Range Status   02/18/2020 13 8 - 27 mg/dL Final   10/21/2019 11 8 - 23 mg/dL Final     Creatinine   Date Value Ref Range Status   02/18/2020 0.66 0.57 - 1.00 mg/dL Final   10/21/2019 0.5 0.5 - 0.9 mg/dL Final     Sodium   Date Value Ref Range " Status   02/18/2020 142 134 - 144 mmol/L Final   10/21/2019 141 136 - 145 mmol/L Final     Potassium   Date Value Ref Range Status   02/18/2020 4.0 3.5 - 5.2 mmol/L Final   10/21/2019 3.7 3.5 - 5.0 mmol/L Final     Chloride   Date Value Ref Range Status   02/18/2020 102 96 - 106 mmol/L Final   10/21/2019 102 98 - 111 mmol/L Final     CO2   Date Value Ref Range Status   10/21/2019 20 (L) 22 - 29 mmol/L Final     Total CO2   Date Value Ref Range Status   02/18/2020 22 20 - 29 mmol/L Final     Calcium   Date Value Ref Range Status   02/18/2020 9.5 8.7 - 10.3 mg/dL Final   10/21/2019 9.5 8.8 - 10.2 mg/dL Final     ALT (SGPT)   Date Value Ref Range Status   02/18/2020 29 0 - 32 IU/L Final   11/19/2019 35 (H) 5 - 33 U/L Final     AST (SGOT)   Date Value Ref Range Status   02/18/2020 22 0 - 40 IU/L Final   11/19/2019 28 5 - 32 U/L Final     WBC   Date Value Ref Range Status   10/21/2019 6.2 4.8 - 10.8 K/uL Final     Hematocrit   Date Value Ref Range Status   10/21/2019 45.7 37.0 - 47.0 % Final     Platelets   Date Value Ref Range Status   10/21/2019 250 130 - 400 K/uL Final     Triglycerides   Date Value Ref Range Status   01/14/2020 1,060 (H) 0 - 149 mg/dL Final     Comment:     Results confirmed on  dilution.     10/21/2019 524 (H) 0 - 149 mg/dL Final     HDL Cholesterol   Date Value Ref Range Status   01/14/2020 41 >39 mg/dL Final   10/21/2019 75 65 - 121 mg/dL Final     Comment:     VALUES>60 MG/DL ARE ASSOCIATED WITH A DECREASED RISK OF  ATHEROSCLEROTIC CARDIOVASCULAR DISEASE     LDL Cholesterol    Date Value Ref Range Status   01/14/2020 Comment 0 - 99 mg/dL Final     Comment:     Triglyceride result indicated is too high for an accurate LDL  cholesterol estimation.     10/21/2019 see below <100 mg/dL Final     Comment:     When the triglyceride is >400 mg/dL the calculated LDL and  VLDL are not valid.  <100 MG/DL=OPITIMAL    100-129 MG/DL=DESIRABLE    130-159 MG/DL BORDERLINE=INCREASED RISK OF  ATHEROSCLEROTIC  CARDIOVASCULAR DISEASE    > OR = 160 MG/DL=ASSOCIATED WITH AN INCREASE RISK OF  ATHEROSCLEROTIC CARDIOVASCULAR DISEASE         Assessment / Plan     Assessment/Plan:  1. Acute costochondritis    - triamcinolone acetonide (KENALOG) injection 40 mg  Heat  Gentle stretching exercise.  Keep next scheduled follow up appointment later this month.    Return if symptoms worsen or fail to improve, for keep scheduled appointment. unless patient needs to be seen sooner or acute issues arise.  I have discussed the patient results/orders and and plan/recommendation with them at today's visit.      Jessica Fierro,    05/08/2020

## 2020-05-14 DIAGNOSIS — E78.1 HIGH TRIGLYCERIDES: ICD-10-CM

## 2020-05-14 DIAGNOSIS — E78.00 HIGH CHOLESTEROL: ICD-10-CM

## 2020-05-19 DIAGNOSIS — E78.1 HIGH TRIGLYCERIDES: ICD-10-CM

## 2020-05-19 RX ORDER — FENOFIBRATE 145 MG/1
TABLET, COATED ORAL
Qty: 30 TABLET | Refills: 0 | Status: SHIPPED | OUTPATIENT
Start: 2020-05-19 | End: 2020-05-19 | Stop reason: SDUPTHER

## 2020-05-19 RX ORDER — FENOFIBRATE 145 MG/1
145 TABLET, COATED ORAL DAILY
Qty: 30 TABLET | Refills: 3 | Status: SHIPPED | OUTPATIENT
Start: 2020-05-19 | End: 2020-11-03

## 2020-05-19 RX ORDER — PAROXETINE HYDROCHLORIDE 20 MG/1
TABLET, FILM COATED ORAL
Qty: 30 TABLET | Refills: 3 | Status: SHIPPED | OUTPATIENT
Start: 2020-05-19 | End: 2022-12-16

## 2020-05-19 RX ORDER — ATORVASTATIN CALCIUM 20 MG/1
20 TABLET, FILM COATED ORAL DAILY
Qty: 30 TABLET | Refills: 3 | Status: SHIPPED | OUTPATIENT
Start: 2020-05-19 | End: 2020-07-15

## 2020-05-20 ENCOUNTER — OFFICE VISIT (OUTPATIENT)
Dept: INTERNAL MEDICINE | Facility: CLINIC | Age: 65
End: 2020-05-20

## 2020-05-20 VITALS
HEIGHT: 63 IN | HEART RATE: 86 BPM | TEMPERATURE: 97.8 F | DIASTOLIC BLOOD PRESSURE: 84 MMHG | WEIGHT: 138 LBS | SYSTOLIC BLOOD PRESSURE: 143 MMHG | BODY MASS INDEX: 24.45 KG/M2 | OXYGEN SATURATION: 100 %

## 2020-05-20 DIAGNOSIS — E78.2 MIXED HYPERLIPIDEMIA: Primary | ICD-10-CM

## 2020-05-20 DIAGNOSIS — Z79.899 HIGH RISK MEDICATION USE: ICD-10-CM

## 2020-05-20 DIAGNOSIS — F41.9 ANXIETY: ICD-10-CM

## 2020-05-20 PROCEDURE — 99214 OFFICE O/P EST MOD 30 MIN: CPT | Performed by: FAMILY MEDICINE

## 2020-05-20 NOTE — PROGRESS NOTES
Subjective     Chief Complaint   Patient presents with   • Follow-up     talk about not taking anxiety meds       History of Present Illness  Patient thinks she is doing well.  She started back to work soon.  She has self discontinued her Paxil.  She does not particularly want to restart it at this time.  She feels she is doing well off of it.  She noted that while she was taking and she had some difficulty with sleeping.  She is tolerating the remainder of her medications without difficulty.  She is trying to stay active.  She is not particularly trying to watch her diet for cholesterol at this time.  Patient's PMR from outside medical facility reviewed and noted.    Review of Systems     Otherwise complete ROS reviewed and negative except as mentioned in the HPI.    Past Medical History:   Past Medical History:   Diagnosis Date   • Anxiety    • Depression    • Hyperlipidemia    • Liver enzyme elevation    • Sleep apnea      Past Surgical History:History reviewed. No pertinent surgical history.  Social History:  reports that she has quit smoking. Her smoking use included cigarettes. She smoked 0.50 packs per day. She has never used smokeless tobacco. She reports that she drinks about 10.0 standard drinks of alcohol per week. She reports that she does not use drugs.    Family History: family history includes ADD / ADHD in her son; Arthritis in her mother and sister; Cancer in her mother and sister; Hyperlipidemia in her mother; Lupus in her brother, maternal aunt, and sister.       Allergies:  Allergies   Allergen Reactions   • Amoxicillin Diarrhea, Nausea Only and Other (See Comments)     Stomach cramping and fatigue   • Peanut-Containing Drug Products Shortness Of Breath   • Diclofenac Sodium Unknown - High Severity     Flushing and soa     Medications:  Prior to Admission medications    Medication Sig Start Date End Date Taking? Authorizing Provider   aspirin 81 MG tablet Take 81 mg by mouth Daily.   Yes  "ProviderMoshe MD   atorvastatin (Lipitor) 20 MG tablet Take 1 tablet by mouth Daily. 5/19/20  Yes Jessica Fierro DO   b complex vitamins capsule Take 1 capsule by mouth Daily.   Yes Moshe Joshi MD   fenofibrate (TRICOR) 145 MG tablet Take 1 tablet by mouth Daily. 5/19/20  Yes Jessica Fierro DO   therapeutic multivitamin-minerals (THERAGRAN-M) tablet Take 1 tablet by mouth Daily.   Yes Moshe Joshi MD   vitamin C (ASCORBIC ACID) 500 MG tablet Take 500 mg by mouth Daily.   Yes Moshe Joshi MD       Objective     Vital Signs: /84 (BP Location: Right arm, Patient Position: Sitting, Cuff Size: Adult)   Pulse 86   Temp 97.8 °F (36.6 °C) (Skin)   Ht 160 cm (62.99\")   Wt 62.6 kg (138 lb)   SpO2 100%   Breastfeeding No   BMI 24.45 kg/m²   Physical Exam   Constitutional: She is oriented to person, place, and time. She appears well-developed and well-nourished.   HENT:   Head: Normocephalic and atraumatic.   Right Ear: External ear normal.   Left Ear: External ear normal.   Nose: Nose normal.   Mouth/Throat: Oropharynx is clear and moist. No oropharyngeal exudate.   Eyes: Pupils are equal, round, and reactive to light. Conjunctivae and EOM are normal. No scleral icterus.   Neck: Normal range of motion. Neck supple. No JVD present.   Cardiovascular: Normal rate, regular rhythm, normal heart sounds and intact distal pulses. Exam reveals no gallop and no friction rub.   No murmur heard.  Pulmonary/Chest: Effort normal and breath sounds normal.   Abdominal: Soft. Bowel sounds are normal.   Musculoskeletal: Normal range of motion. She exhibits no edema.   Neurological: She is alert and oriented to person, place, and time. No cranial nerve deficit.   Skin: Skin is warm and dry. Capillary refill takes less than 2 seconds.   Psychiatric: She has a normal mood and affect. Her behavior is normal.   Nursing note and vitals reviewed.      Patient's Body mass index is 24.45 " kg/m². BMI is within normal parameters. No follow-up required..      Results Reviewed:  Glucose   Date Value Ref Range Status   10/21/2019 106 74 - 109 mg/dL Final     BUN   Date Value Ref Range Status   02/18/2020 13 8 - 27 mg/dL Final   10/21/2019 11 8 - 23 mg/dL Final     Creatinine   Date Value Ref Range Status   02/18/2020 0.66 0.57 - 1.00 mg/dL Final   10/21/2019 0.5 0.5 - 0.9 mg/dL Final     Sodium   Date Value Ref Range Status   02/18/2020 142 134 - 144 mmol/L Final   10/21/2019 141 136 - 145 mmol/L Final     Potassium   Date Value Ref Range Status   02/18/2020 4.0 3.5 - 5.2 mmol/L Final   10/21/2019 3.7 3.5 - 5.0 mmol/L Final     Chloride   Date Value Ref Range Status   02/18/2020 102 96 - 106 mmol/L Final   10/21/2019 102 98 - 111 mmol/L Final     CO2   Date Value Ref Range Status   10/21/2019 20 (L) 22 - 29 mmol/L Final     Total CO2   Date Value Ref Range Status   02/18/2020 22 20 - 29 mmol/L Final     Calcium   Date Value Ref Range Status   02/18/2020 9.5 8.7 - 10.3 mg/dL Final   10/21/2019 9.5 8.8 - 10.2 mg/dL Final     ALT (SGPT)   Date Value Ref Range Status   02/18/2020 29 0 - 32 IU/L Final   11/19/2019 35 (H) 5 - 33 U/L Final     AST (SGOT)   Date Value Ref Range Status   02/18/2020 22 0 - 40 IU/L Final   11/19/2019 28 5 - 32 U/L Final     WBC   Date Value Ref Range Status   10/21/2019 6.2 4.8 - 10.8 K/uL Final     Hematocrit   Date Value Ref Range Status   10/21/2019 45.7 37.0 - 47.0 % Final     Platelets   Date Value Ref Range Status   10/21/2019 250 130 - 400 K/uL Final     Triglycerides   Date Value Ref Range Status   01/14/2020 1,060 (H) 0 - 149 mg/dL Final     Comment:     Results confirmed on  dilution.     10/21/2019 524 (H) 0 - 149 mg/dL Final     HDL Cholesterol   Date Value Ref Range Status   01/14/2020 41 >39 mg/dL Final   10/21/2019 75 65 - 121 mg/dL Final     Comment:     VALUES>60 MG/DL ARE ASSOCIATED WITH A DECREASED RISK OF  ATHEROSCLEROTIC CARDIOVASCULAR DISEASE     LDL  Cholesterol    Date Value Ref Range Status   01/14/2020 Comment 0 - 99 mg/dL Final     Comment:     Triglyceride result indicated is too high for an accurate LDL  cholesterol estimation.     10/21/2019 see below <100 mg/dL Final     Comment:     When the triglyceride is >400 mg/dL the calculated LDL and  VLDL are not valid.  <100 MG/DL=OPITIMAL    100-129 MG/DL=DESIRABLE    130-159 MG/DL BORDERLINE=INCREASED RISK OF ATHEROSCLEROTIC  CARDIOVASCULAR DISEASE    > OR = 160 MG/DL=ASSOCIATED WITH AN INCREASE RISK OF  ATHEROSCLEROTIC CARDIOVASCULAR DISEASE         Assessment / Plan     Assessment/Plan:  1. Mixed hyperlipidemia  Recommend chemistry and a lipid profile  Daily exercise  Monitor cholesterol and diet    2. Anxiety  May stay off of the Paxil.  I have asked her to evaluate herself on a weekly basis as far as whether or not she is having issues with increasing panic or anxiety.  If she is she is to contact us.    3. High risk medication use  CBC        Return in about 3 months (around 8/20/2020). unless patient needs to be seen sooner or acute issues arise.        I have discussed the patient results/orders and and plan/recommendation with them at today's visit.      Jessica Fierro, DO   05/20/2020

## 2020-05-22 ENCOUNTER — CLINICAL SUPPORT (OUTPATIENT)
Dept: INTERNAL MEDICINE | Facility: CLINIC | Age: 65
End: 2020-05-22

## 2020-05-22 DIAGNOSIS — E78.2 MIXED HYPERLIPIDEMIA: Primary | ICD-10-CM

## 2020-05-22 DIAGNOSIS — Z79.899 HIGH RISK MEDICATION USE: ICD-10-CM

## 2020-05-22 PROCEDURE — 36415 COLL VENOUS BLD VENIPUNCTURE: CPT | Performed by: FAMILY MEDICINE

## 2020-05-22 NOTE — PROGRESS NOTES
Venipuncture Blood Specimen Collection  Venipuncture performed in [facility] by Annetta Gomez CMA with good hemostasis. Patient tolerated the procedure well without complications.   05/22/20   Annteta Gomez CMA

## 2020-05-23 LAB
ALBUMIN SERPL-MCNC: 4.3 G/DL (ref 3.8–4.8)
ALBUMIN/GLOB SERPL: 1.8 {RATIO} (ref 1.2–2.2)
ALP SERPL-CCNC: 80 IU/L (ref 39–117)
ALT SERPL-CCNC: 24 IU/L (ref 0–32)
AST SERPL-CCNC: 19 IU/L (ref 0–40)
BASOPHILS # BLD AUTO: 0 X10E3/UL (ref 0–0.2)
BASOPHILS NFR BLD AUTO: 1 %
BILIRUB SERPL-MCNC: 0.3 MG/DL (ref 0–1.2)
BUN SERPL-MCNC: 15 MG/DL (ref 8–27)
BUN/CREAT SERPL: 22 (ref 12–28)
CALCIUM SERPL-MCNC: 9.3 MG/DL (ref 8.7–10.3)
CHLORIDE SERPL-SCNC: 102 MMOL/L (ref 96–106)
CHOLEST SERPL-MCNC: 281 MG/DL (ref 100–199)
CO2 SERPL-SCNC: 23 MMOL/L (ref 20–29)
CREAT SERPL-MCNC: 0.69 MG/DL (ref 0.57–1)
EOSINOPHIL # BLD AUTO: 0.1 X10E3/UL (ref 0–0.4)
EOSINOPHIL NFR BLD AUTO: 1 %
ERYTHROCYTE [DISTWIDTH] IN BLOOD BY AUTOMATED COUNT: 13.1 % (ref 11.7–15.4)
GLOBULIN SER CALC-MCNC: 2.4 G/DL (ref 1.5–4.5)
GLUCOSE SERPL-MCNC: 108 MG/DL (ref 65–99)
HCT VFR BLD AUTO: 40.1 % (ref 34–46.6)
HDLC SERPL-MCNC: 64 MG/DL
HGB BLD-MCNC: 13.4 G/DL (ref 11.1–15.9)
IMM GRANULOCYTES # BLD AUTO: 0 X10E3/UL (ref 0–0.1)
IMM GRANULOCYTES NFR BLD AUTO: 0 %
LDLC SERPL CALC-MCNC: ABNORMAL MG/DL (ref 0–99)
LYMPHOCYTES # BLD AUTO: 1.8 X10E3/UL (ref 0.7–3.1)
LYMPHOCYTES NFR BLD AUTO: 27 %
MCH RBC QN AUTO: 32.1 PG (ref 26.6–33)
MCHC RBC AUTO-ENTMCNC: 33.4 G/DL (ref 31.5–35.7)
MCV RBC AUTO: 96 FL (ref 79–97)
MONOCYTES # BLD AUTO: 0.5 X10E3/UL (ref 0.1–0.9)
MONOCYTES NFR BLD AUTO: 8 %
NEUTROPHILS # BLD AUTO: 4.2 X10E3/UL (ref 1.4–7)
NEUTROPHILS NFR BLD AUTO: 63 %
PLATELET # BLD AUTO: 240 X10E3/UL (ref 150–450)
POTASSIUM SERPL-SCNC: 3.7 MMOL/L (ref 3.5–5.2)
PROT SERPL-MCNC: 6.7 G/DL (ref 6–8.5)
RBC # BLD AUTO: 4.18 X10E6/UL (ref 3.77–5.28)
SODIUM SERPL-SCNC: 142 MMOL/L (ref 134–144)
TRIGL SERPL-MCNC: 562 MG/DL (ref 0–149)
VLDLC SERPL CALC-MCNC: ABNORMAL MG/DL (ref 5–40)
WBC # BLD AUTO: 6.7 X10E3/UL (ref 3.4–10.8)

## 2020-05-27 ENCOUNTER — TELEPHONE (OUTPATIENT)
Dept: INTERNAL MEDICINE | Facility: CLINIC | Age: 65
End: 2020-05-27

## 2020-05-27 NOTE — TELEPHONE ENCOUNTER
Patient notified of result note from provider. Patient voices understanding. She will take two 20mg lipitor tablet daily and will call us before she runs out of medication for refill. Patient has 3 month follow-up scheduled and will get follow-up labs at this time.

## 2020-05-27 NOTE — TELEPHONE ENCOUNTER
----- Message from Jessica Fierro DO sent at 5/27/2020  2:00 PM CDT -----  Cholesterol and triglycerides improved.  Needs more help.  Increase lipitor to 40 mg daily.   Repeat lab in 3 months.

## 2020-06-16 ENCOUNTER — OFFICE VISIT (OUTPATIENT)
Dept: INTERNAL MEDICINE | Facility: CLINIC | Age: 65
End: 2020-06-16

## 2020-06-16 VITALS
DIASTOLIC BLOOD PRESSURE: 83 MMHG | WEIGHT: 135.5 LBS | TEMPERATURE: 98.5 F | BODY MASS INDEX: 24.01 KG/M2 | HEART RATE: 82 BPM | SYSTOLIC BLOOD PRESSURE: 138 MMHG | HEIGHT: 63 IN | OXYGEN SATURATION: 98 %

## 2020-06-16 DIAGNOSIS — R10.11 RUQ PAIN: Primary | ICD-10-CM

## 2020-06-16 DIAGNOSIS — R07.89 RIGHT-SIDED CHEST WALL PAIN: ICD-10-CM

## 2020-06-16 PROCEDURE — 99214 OFFICE O/P EST MOD 30 MIN: CPT | Performed by: FAMILY MEDICINE

## 2020-06-16 NOTE — PROGRESS NOTES
Subjective     Chief Complaint   Patient presents with   • Abdominal Pain     Right Side moves around from her back to her abdomen   • Bloated       History of Present Illness  Patient returns with complaints of recurrent right-sided abdominal discomfort and chest pain.  Previously she had received a steroid injection.  She felt this did improve the pain some but has come back.  It is persistent.  It hurts worse when she touches the region.  She has no associated nausea or vomiting.  She does now have a bloated sensation.  Nothing she does really makes the pain better.  She is having normal bowel movements.  She is not short of breath.  Patient's PMR from outside medical facility reviewed and noted.    Review of Systems     Otherwise complete ROS reviewed and negative except as mentioned in the HPI.    Past Medical History:   Past Medical History:   Diagnosis Date   • Anxiety    • Depression    • Hyperlipidemia    • Liver enzyme elevation    • Sleep apnea      Past Surgical History:History reviewed. No pertinent surgical history.  Social History:  reports that she has quit smoking. Her smoking use included cigarettes. She smoked 0.50 packs per day. She has never used smokeless tobacco. She reports that she drinks about 10.0 standard drinks of alcohol per week. She reports that she does not use drugs.    Family History: family history includes ADD / ADHD in her son; Arthritis in her mother and sister; Cancer in her mother and sister; Hyperlipidemia in her mother; Lupus in her brother, maternal aunt, and sister.       Allergies:  Allergies   Allergen Reactions   • Amoxicillin Diarrhea, Nausea Only and Other (See Comments)     Stomach cramping and fatigue   • Peanut-Containing Drug Products Shortness Of Breath   • Diclofenac Sodium Unknown - High Severity     Flushing and soa     Medications:  Prior to Admission medications    Medication Sig Start Date End Date Taking? Authorizing Provider   aspirin 81 MG  "tablet Take 81 mg by mouth Daily.    Moshe Joshi MD   atorvastatin (Lipitor) 20 MG tablet Take 1 tablet by mouth Daily. 5/19/20   Jessica Fierro DO   b complex vitamins capsule Take 1 capsule by mouth Daily.    Moshe Joshi MD   fenofibrate (TRICOR) 145 MG tablet Take 1 tablet by mouth Daily. 5/19/20   Jessica Fierro DO   PARoxetine (PAXIL) 20 MG tablet TAKE 1 TABLET BY MOUTH ONCE DAILY IN THE MORNING 5/19/20   Jessica Fierro DO   therapeutic multivitamin-minerals (THERAGRAN-M) tablet Take 1 tablet by mouth Daily.    Moshe Joshi MD   vitamin C (ASCORBIC ACID) 500 MG tablet Take 500 mg by mouth Daily.    Moshe Joshi MD   Aspirin Buf,CaCarb-MgCarb-MgO, 81 MG tablet Take 81 mg by mouth.  6/16/20  Moshe Joshi MD       Objective     Vital Signs: /83 (BP Location: Right arm, Patient Position: Sitting, Cuff Size: Adult)   Pulse 82   Temp 98.5 °F (36.9 °C) (Skin)   Ht 160 cm (62.99\")   Wt 61.5 kg (135 lb 8 oz)   SpO2 98%   Breastfeeding No   BMI 24.01 kg/m²   Physical Exam   Constitutional: She is oriented to person, place, and time. She appears well-developed and well-nourished.   HENT:   Head: Normocephalic and atraumatic.   Mouth/Throat: Oropharynx is clear and moist. No oropharyngeal exudate.   Eyes: Pupils are equal, round, and reactive to light. Conjunctivae and EOM are normal.   Neck: Normal range of motion. Neck supple. No JVD present.   Cardiovascular: Normal rate, regular rhythm, normal heart sounds and intact distal pulses. Exam reveals no gallop and no friction rub.   No murmur heard.  Pulmonary/Chest: Effort normal and breath sounds normal.   Patient has tenderness palpation over the right lateral chest wall lower 4 ribs.   Abdominal: Soft. Bowel sounds are normal. She exhibits no distension. There is tenderness ( Patient does have tenderness over the upper abdomen.  With more intense tenderness over the right upper quadrant.  There " is no guarding or peritoneal signs.).   Musculoskeletal: Normal range of motion. She exhibits no edema.   Neurological: She is alert and oriented to person, place, and time. No cranial nerve deficit.   Skin: Skin is warm and dry. Capillary refill takes less than 2 seconds.   Psychiatric: She has a normal mood and affect. Her behavior is normal. Judgment and thought content normal.   Nursing note and vitals reviewed.          Results Reviewed:  Glucose   Date Value Ref Range Status   10/21/2019 106 74 - 109 mg/dL Final     BUN   Date Value Ref Range Status   05/22/2020 15 8 - 27 mg/dL Final   10/21/2019 11 8 - 23 mg/dL Final     Creatinine   Date Value Ref Range Status   05/22/2020 0.69 0.57 - 1.00 mg/dL Final   10/21/2019 0.5 0.5 - 0.9 mg/dL Final     Sodium   Date Value Ref Range Status   05/22/2020 142 134 - 144 mmol/L Final   10/21/2019 141 136 - 145 mmol/L Final     Potassium   Date Value Ref Range Status   05/22/2020 3.7 3.5 - 5.2 mmol/L Final   10/21/2019 3.7 3.5 - 5.0 mmol/L Final     Chloride   Date Value Ref Range Status   05/22/2020 102 96 - 106 mmol/L Final   10/21/2019 102 98 - 111 mmol/L Final     CO2   Date Value Ref Range Status   10/21/2019 20 (L) 22 - 29 mmol/L Final     Total CO2   Date Value Ref Range Status   05/22/2020 23 20 - 29 mmol/L Final     Calcium   Date Value Ref Range Status   05/22/2020 9.3 8.7 - 10.3 mg/dL Final   10/21/2019 9.5 8.8 - 10.2 mg/dL Final     ALT (SGPT)   Date Value Ref Range Status   05/22/2020 24 0 - 32 IU/L Final   11/19/2019 35 (H) 5 - 33 U/L Final     AST (SGOT)   Date Value Ref Range Status   05/22/2020 19 0 - 40 IU/L Final   11/19/2019 28 5 - 32 U/L Final     WBC   Date Value Ref Range Status   05/22/2020 6.7 3.4 - 10.8 x10E3/uL Final   10/21/2019 6.2 4.8 - 10.8 K/uL Final     Hematocrit   Date Value Ref Range Status   05/22/2020 40.1 34.0 - 46.6 % Final   10/21/2019 45.7 37.0 - 47.0 % Final     Platelets   Date Value Ref Range Status   05/22/2020 240 150 - 450  x10E3/uL Final   10/21/2019 250 130 - 400 K/uL Final     Triglycerides   Date Value Ref Range Status   05/22/2020 562 (H) 0 - 149 mg/dL Final   10/21/2019 524 (H) 0 - 149 mg/dL Final     HDL Cholesterol   Date Value Ref Range Status   05/22/2020 64 >39 mg/dL Final   10/21/2019 75 65 - 121 mg/dL Final     Comment:     VALUES>60 MG/DL ARE ASSOCIATED WITH A DECREASED RISK OF  ATHEROSCLEROTIC CARDIOVASCULAR DISEASE     LDL Cholesterol    Date Value Ref Range Status   05/22/2020 Comment 0 - 99 mg/dL Final     Comment:     Triglyceride result indicated is too high for an accurate LDL  cholesterol estimation.     10/21/2019 see below <100 mg/dL Final     Comment:     When the triglyceride is >400 mg/dL the calculated LDL and  VLDL are not valid.  <100 MG/DL=OPITIMAL    100-129 MG/DL=DESIRABLE    130-159 MG/DL BORDERLINE=INCREASED RISK OF ATHEROSCLEROTIC  CARDIOVASCULAR DISEASE    > OR = 160 MG/DL=ASSOCIATED WITH AN INCREASE RISK OF  ATHEROSCLEROTIC CARDIOVASCULAR DISEASE         Assessment / Plan     Assessment/Plan:  1. RUQ pain  - CT Abdomen Pelvis With & Without Contrast; Future    2. Right-sided chest wall pain    - XR Chest PA & Lateral (In Office)  Reviewed.  No acute process.     Return in about 1 week (around 6/23/2020). unless patient needs to be seen sooner or acute issues arise.    I have discussed the patient results/orders and and plan/recommendation with them at today's visit.      Jessica Fierro,    06/16/2020

## 2020-06-17 ENCOUNTER — TELEPHONE (OUTPATIENT)
Dept: INTERNAL MEDICINE | Facility: CLINIC | Age: 65
End: 2020-06-17

## 2020-06-17 NOTE — TELEPHONE ENCOUNTER
I spoke to patient and let her know of result note from provider. Patient voices understanding and has no further questions at this time.

## 2020-07-14 ENCOUNTER — TELEPHONE (OUTPATIENT)
Dept: INTERNAL MEDICINE | Facility: CLINIC | Age: 65
End: 2020-07-14

## 2020-07-14 NOTE — TELEPHONE ENCOUNTER
Pt lvm that she got her medication for her Statin, but it was only 20mg,  She thought she is suppose to take 40mg?  Please send to Zakia to call when with answer, thanks

## 2020-07-15 DIAGNOSIS — E78.00 HIGH CHOLESTEROL: ICD-10-CM

## 2020-07-15 RX ORDER — ATORVASTATIN CALCIUM 40 MG/1
40 TABLET, FILM COATED ORAL DAILY
Qty: 30 TABLET | Refills: 3 | Status: SHIPPED | OUTPATIENT
Start: 2020-07-15 | End: 2020-10-27 | Stop reason: SDUPTHER

## 2020-07-15 NOTE — TELEPHONE ENCOUNTER
Take 2 daily of the 20 mg and when out the refill for the pharmacy has been sent for 40 mg tablet daily

## 2020-07-15 NOTE — TELEPHONE ENCOUNTER
Called patient and explained she should take 2 of the 20 mg tablets to equal 40 mg. When the RX is completed, an new order for the 40 mg tablet will be sent.     Patient voiced understanding and had no further questions.

## 2020-07-17 ENCOUNTER — TELEPHONE (OUTPATIENT)
Dept: INTERNAL MEDICINE | Facility: CLINIC | Age: 65
End: 2020-07-17

## 2020-07-17 PROCEDURE — U0003 INFECTIOUS AGENT DETECTION BY NUCLEIC ACID (DNA OR RNA); SEVERE ACUTE RESPIRATORY SYNDROME CORONAVIRUS 2 (SARS-COV-2) (CORONAVIRUS DISEASE [COVID-19]), AMPLIFIED PROBE TECHNIQUE, MAKING USE OF HIGH THROUGHPUT TECHNOLOGIES AS DESCRIBED BY CMS-2020-01-R: HCPCS | Performed by: FAMILY MEDICINE

## 2020-07-17 NOTE — TELEPHONE ENCOUNTER
Patient called and stated that she was sent home from work with a fever and cough. Patient is requesting COVID-19 test at urgent care.

## 2020-07-17 NOTE — TELEPHONE ENCOUNTER
Called in verbal orders to [facility]. They communicated that they will call patient to schedule appointment.

## 2020-07-21 ENCOUNTER — TELEPHONE (OUTPATIENT)
Dept: INTERNAL MEDICINE | Facility: CLINIC | Age: 65
End: 2020-07-21

## 2020-07-21 NOTE — TELEPHONE ENCOUNTER
Called patient and gave negative covid result. Patient voiced understanding and had no further questions.

## 2020-08-25 ENCOUNTER — OFFICE VISIT (OUTPATIENT)
Dept: INTERNAL MEDICINE | Facility: CLINIC | Age: 65
End: 2020-08-25

## 2020-08-25 VITALS
RESPIRATION RATE: 18 BRPM | OXYGEN SATURATION: 98 % | DIASTOLIC BLOOD PRESSURE: 83 MMHG | BODY MASS INDEX: 25.17 KG/M2 | HEART RATE: 93 BPM | SYSTOLIC BLOOD PRESSURE: 144 MMHG | WEIGHT: 136.8 LBS | HEIGHT: 62 IN | TEMPERATURE: 98.4 F

## 2020-08-25 DIAGNOSIS — E78.2 MIXED HYPERLIPIDEMIA: Primary | ICD-10-CM

## 2020-08-25 DIAGNOSIS — M25.50 ARTHRALGIA, UNSPECIFIED JOINT: ICD-10-CM

## 2020-08-25 DIAGNOSIS — F34.1 DYSTHYMIA: ICD-10-CM

## 2020-08-25 DIAGNOSIS — R03.0 ELEVATED BLOOD PRESSURE READING WITHOUT DIAGNOSIS OF HYPERTENSION: ICD-10-CM

## 2020-08-25 PROCEDURE — 99214 OFFICE O/P EST MOD 30 MIN: CPT | Performed by: FAMILY MEDICINE

## 2020-08-25 PROCEDURE — 36415 COLL VENOUS BLD VENIPUNCTURE: CPT | Performed by: FAMILY MEDICINE

## 2020-08-25 NOTE — PROGRESS NOTES
Venipuncture Blood Specimen Collection  Venipuncture performed in left ac by Zakia Yee MA with good hemostasis. Patient tolerated the procedure well without complications.   08/25/20   Zakia Yee MA

## 2020-08-25 NOTE — PROGRESS NOTES
Subjective     Chief Complaint   Patient presents with   • Hyperlipidemia     Patient presents for a follow-up.       History of Present Illness  She has a history of hyperlipidemia anemia and is here for follow-up visit.  Overall she feels she has been doing okay.  She is tolerating her medicines without difficulty.  Patient relates that she does not take the flu shot when queried.  She has had a pneumonia shot she does not know which kind.  This is done at [facility].  She does not routinely monitor her blood pressure.  Patient notes that she would like to try some acetaminophen arthritis strength.  She was wondering if this is okay to do.  We did discuss that acetaminophen has a maximum dose of 3000 to 4000 mg/day related to age.  Have cautioned her not to exceed this.  We did also discuss dosing on ibuprofen.  Also discussed not taking additional NSAIDs when taking ibuprofen.  Patient's PMR from outside medical facility reviewed and noted.    Review of Systems   Constitutional: Negative.    HENT: Negative.    Eyes: Negative.    Respiratory: Negative.    Cardiovascular: Negative.    Gastrointestinal: Negative.    Endocrine: Negative.    Genitourinary: Negative.    Musculoskeletal: Negative.    Skin: Negative.    Allergic/Immunologic: Negative.    Neurological: Negative.    Hematological: Negative.    Psychiatric/Behavioral: Negative.           Past Medical History:   Past Medical History:   Diagnosis Date   • Anxiety    • Depression    • Hyperlipidemia    • Liver enzyme elevation    • Sleep apnea      Past Surgical History:History reviewed. No pertinent surgical history.  Social History:  reports that she quit smoking about 19 months ago. Her smoking use included cigarettes. She has a 7.50 pack-year smoking history. She has never used smokeless tobacco. She reports that she drinks about 10.0 standard drinks of alcohol per week. She reports that she does not use drugs.    Family History: family history includes  "ADD / ADHD in her son; Arthritis in her mother and sister; Cancer in her mother and sister; Hyperlipidemia in her mother; Lupus in her brother, maternal aunt, and sister.       Allergies:  Allergies   Allergen Reactions   • Amoxicillin Diarrhea, Nausea Only and Other (See Comments)     Stomach cramping and fatigue   • Peanut-Containing Drug Products Shortness Of Breath   • Diclofenac Sodium Unknown - High Severity     Flushing and soa     Medications:  Prior to Admission medications    Medication Sig Start Date End Date Taking? Authorizing Provider   aspirin 81 MG tablet Take 81 mg by mouth Daily.   Yes ProviderMoshe MD   atorvastatin (LIPITOR) 40 MG tablet Take 1 tablet by mouth Daily. 7/15/20  Yes Jessica Fierro DO   b complex vitamins capsule Take 1 capsule by mouth Daily.   Yes ProviderMoshe MD   fenofibrate (TRICOR) 145 MG tablet Take 1 tablet by mouth Daily. 5/19/20  Yes Jessica Fierro DO   therapeutic multivitamin-minerals (THERAGRAN-M) tablet Take 1 tablet by mouth Daily.   Yes ProviderMoshe MD   vitamin C (ASCORBIC ACID) 500 MG tablet Take 500 mg by mouth Daily.   Yes ProviderMoshe MD   PARoxetine (PAXIL) 20 MG tablet TAKE 1 TABLET BY MOUTH ONCE DAILY IN THE MORNING 5/19/20   Jessica Fierro DO       Objective     Vital Signs: /83 (BP Location: Right arm, Patient Position: Sitting, Cuff Size: Adult)   Pulse 93   Temp 98.4 °F (36.9 °C) (Infrared)   Resp 18   Ht 157.5 cm (62\")   Wt 62.1 kg (136 lb 12.8 oz)   SpO2 98%   BMI 25.02 kg/m²   Physical Exam   Constitutional: She is oriented to person, place, and time. She appears well-developed and well-nourished.   HENT:   Head: Normocephalic and atraumatic.   Right Ear: External ear normal.   Left Ear: External ear normal.   Nose: Nose normal.   Mouth/Throat: Oropharynx is clear and moist. No oropharyngeal exudate.   Eyes: Pupils are equal, round, and reactive to light. Conjunctivae and EOM are normal. " Right eye exhibits no discharge. Left eye exhibits no discharge. No scleral icterus.   Neck: Normal range of motion. Neck supple. No JVD present.   Cardiovascular: Normal rate, regular rhythm, normal heart sounds and intact distal pulses. Exam reveals no gallop and no friction rub.   No murmur heard.  Pulmonary/Chest: Effort normal and breath sounds normal.   Abdominal: Soft. Bowel sounds are normal.   Musculoskeletal: Normal range of motion. She exhibits no edema.   Neurological: She is alert and oriented to person, place, and time.   Skin: Skin is warm and dry. Capillary refill takes less than 2 seconds.   Psychiatric: She has a normal mood and affect. Her behavior is normal. Judgment and thought content normal.   Nursing note and vitals reviewed.      Patient's Body mass index is 25.02 kg/m². BMI is within normal parameters. No follow-up required..      Results Reviewed:  Glucose   Date Value Ref Range Status   10/21/2019 106 74 - 109 mg/dL Final     BUN   Date Value Ref Range Status   05/22/2020 15 8 - 27 mg/dL Final   10/21/2019 11 8 - 23 mg/dL Final     Creatinine   Date Value Ref Range Status   05/22/2020 0.69 0.57 - 1.00 mg/dL Final   10/21/2019 0.5 0.5 - 0.9 mg/dL Final     Sodium   Date Value Ref Range Status   05/22/2020 142 134 - 144 mmol/L Final   10/21/2019 141 136 - 145 mmol/L Final     Potassium   Date Value Ref Range Status   05/22/2020 3.7 3.5 - 5.2 mmol/L Final   10/21/2019 3.7 3.5 - 5.0 mmol/L Final     Chloride   Date Value Ref Range Status   05/22/2020 102 96 - 106 mmol/L Final   10/21/2019 102 98 - 111 mmol/L Final     CO2   Date Value Ref Range Status   10/21/2019 20 (L) 22 - 29 mmol/L Final     Total CO2   Date Value Ref Range Status   05/22/2020 23 20 - 29 mmol/L Final     Calcium   Date Value Ref Range Status   05/22/2020 9.3 8.7 - 10.3 mg/dL Final   10/21/2019 9.5 8.8 - 10.2 mg/dL Final     ALT (SGPT)   Date Value Ref Range Status   05/22/2020 24 0 - 32 IU/L Final   11/19/2019 35 (H) 5 -  33 U/L Final     AST (SGOT)   Date Value Ref Range Status   05/22/2020 19 0 - 40 IU/L Final   11/19/2019 28 5 - 32 U/L Final     WBC   Date Value Ref Range Status   05/22/2020 6.7 3.4 - 10.8 x10E3/uL Final   10/21/2019 6.2 4.8 - 10.8 K/uL Final     Hematocrit   Date Value Ref Range Status   05/22/2020 40.1 34.0 - 46.6 % Final   10/21/2019 45.7 37.0 - 47.0 % Final     Platelets   Date Value Ref Range Status   05/22/2020 240 150 - 450 x10E3/uL Final   10/21/2019 250 130 - 400 K/uL Final     Triglycerides   Date Value Ref Range Status   05/22/2020 562 (H) 0 - 149 mg/dL Final   10/21/2019 524 (H) 0 - 149 mg/dL Final     HDL Cholesterol   Date Value Ref Range Status   05/22/2020 64 >39 mg/dL Final   10/21/2019 75 65 - 121 mg/dL Final     Comment:     VALUES>60 MG/DL ARE ASSOCIATED WITH A DECREASED RISK OF  ATHEROSCLEROTIC CARDIOVASCULAR DISEASE     LDL Cholesterol    Date Value Ref Range Status   05/22/2020 Comment 0 - 99 mg/dL Final     Comment:     Triglyceride result indicated is too high for an accurate LDL  cholesterol estimation.     10/21/2019 see below <100 mg/dL Final     Comment:     When the triglyceride is >400 mg/dL the calculated LDL and  VLDL are not valid.  <100 MG/DL=OPITIMAL    100-129 MG/DL=DESIRABLE    130-159 MG/DL BORDERLINE=INCREASED RISK OF ATHEROSCLEROTIC  CARDIOVASCULAR DISEASE    > OR = 160 MG/DL=ASSOCIATED WITH AN INCREASE RISK OF  ATHEROSCLEROTIC CARDIOVASCULAR DISEASE         Assessment / Plan     Assessment/Plan:  1. Mixed hyperlipidemia    - Comprehensive metabolic panel  - Lipid panel    2. Elevated blood pressure reading without diagnosis of hypertension  Monitor blood pressure twice daily keep a log.  Return to office in 2 weeks with log.    3. Dysthymia  Continue current medications    4. Arthralgia, unspecified joint  May use acetaminophen.  Dose less than 4000 mg/day in divided doses.  Patient verbalized understanding.        Return in about 2 weeks (around 9/8/2020). unless patient  needs to be seen sooner or acute issues arise.        I have discussed the patient results/orders and and plan/recommendation with them at today's visit.      Jessica Fierro,    08/25/2020

## 2020-08-26 ENCOUNTER — TELEPHONE (OUTPATIENT)
Dept: INTERNAL MEDICINE | Facility: CLINIC | Age: 65
End: 2020-08-26

## 2020-08-26 LAB
ALBUMIN SERPL-MCNC: 4.8 G/DL (ref 3.8–4.8)
ALBUMIN/GLOB SERPL: 2.2 {RATIO} (ref 1.2–2.2)
ALP SERPL-CCNC: 81 IU/L (ref 39–117)
ALT SERPL-CCNC: 24 IU/L (ref 0–32)
AST SERPL-CCNC: 19 IU/L (ref 0–40)
BILIRUB SERPL-MCNC: 0.3 MG/DL (ref 0–1.2)
BUN SERPL-MCNC: 10 MG/DL (ref 8–27)
BUN/CREAT SERPL: 19 (ref 12–28)
CALCIUM SERPL-MCNC: 9.7 MG/DL (ref 8.7–10.3)
CHLORIDE SERPL-SCNC: 102 MMOL/L (ref 96–106)
CHOLEST SERPL-MCNC: 211 MG/DL (ref 100–199)
CO2 SERPL-SCNC: 25 MMOL/L (ref 20–29)
CREAT SERPL-MCNC: 0.52 MG/DL (ref 0.57–1)
GLOBULIN SER CALC-MCNC: 2.2 G/DL (ref 1.5–4.5)
GLUCOSE SERPL-MCNC: 108 MG/DL (ref 65–99)
HDLC SERPL-MCNC: 65 MG/DL
LDLC SERPL CALC-MCNC: 109 MG/DL (ref 0–99)
POTASSIUM SERPL-SCNC: 4 MMOL/L (ref 3.5–5.2)
PROT SERPL-MCNC: 7 G/DL (ref 6–8.5)
SODIUM SERPL-SCNC: 143 MMOL/L (ref 134–144)
TRIGL SERPL-MCNC: 185 MG/DL (ref 0–149)
VLDLC SERPL CALC-MCNC: 37 MG/DL (ref 5–40)

## 2020-08-26 NOTE — TELEPHONE ENCOUNTER
Called patient to inform her of lab results per Dr. Fierro. Asked patient to return to test her Hemoglobin A1C. Patients states she has to work the next two days, but she plans to come Friday.

## 2020-08-26 NOTE — TELEPHONE ENCOUNTER
----- Message from Jessica Fierro DO sent at 8/26/2020  8:57 AM CDT -----  Cholesterol much improved.  Chemistries normal except sugar is 108.  Recommend hemoglobin A1c

## 2020-10-27 DIAGNOSIS — E78.00 HIGH CHOLESTEROL: ICD-10-CM

## 2020-10-27 RX ORDER — ATORVASTATIN CALCIUM 40 MG/1
40 TABLET, FILM COATED ORAL DAILY
Qty: 30 TABLET | Refills: 3 | Status: SHIPPED | OUTPATIENT
Start: 2020-10-27 | End: 2021-04-15

## 2020-10-31 DIAGNOSIS — E78.1 HIGH TRIGLYCERIDES: ICD-10-CM

## 2020-11-03 RX ORDER — FENOFIBRATE 145 MG/1
TABLET, COATED ORAL
Qty: 30 TABLET | Refills: 3 | Status: SHIPPED | OUTPATIENT
Start: 2020-11-03 | End: 2021-08-25

## 2021-01-26 ENCOUNTER — TELEPHONE (OUTPATIENT)
Dept: INTERNAL MEDICINE | Facility: CLINIC | Age: 66
End: 2021-01-26

## 2021-01-26 ENCOUNTER — OFFICE VISIT (OUTPATIENT)
Dept: INTERNAL MEDICINE | Facility: CLINIC | Age: 66
End: 2021-01-26

## 2021-01-26 VITALS
BODY MASS INDEX: 24.97 KG/M2 | HEART RATE: 93 BPM | OXYGEN SATURATION: 99 % | SYSTOLIC BLOOD PRESSURE: 138 MMHG | HEIGHT: 62 IN | DIASTOLIC BLOOD PRESSURE: 91 MMHG | RESPIRATION RATE: 18 BRPM | TEMPERATURE: 98 F | WEIGHT: 135.7 LBS

## 2021-01-26 DIAGNOSIS — R07.89 CHEST WALL PAIN: Primary | ICD-10-CM

## 2021-01-26 DIAGNOSIS — M51.34 THORACIC DEGENERATIVE DISC DISEASE: ICD-10-CM

## 2021-01-26 PROCEDURE — 99214 OFFICE O/P EST MOD 30 MIN: CPT | Performed by: FAMILY MEDICINE

## 2021-01-26 RX ORDER — TRAMADOL HYDROCHLORIDE 50 MG/1
50 TABLET ORAL EVERY 8 HOURS PRN
Qty: 30 TABLET | Refills: 1 | Status: SHIPPED | OUTPATIENT
Start: 2021-01-26 | End: 2021-02-23 | Stop reason: SDUPTHER

## 2021-01-26 RX ORDER — LIDOCAINE 50 MG/G
1 PATCH TOPICAL EVERY 24 HOURS
Qty: 30 PATCH | Refills: 2 | Status: SHIPPED | OUTPATIENT
Start: 2021-01-26 | End: 2021-04-09

## 2021-01-26 NOTE — TELEPHONE ENCOUNTER
Called and communicated xray results per Dr. Fierro. Patient voiced understanding and had no further questions. Advised patient to call if any questions do arise at any time.

## 2021-01-26 NOTE — PROGRESS NOTES
Subjective     Chief Complaint   Patient presents with   • Abdominal Pain     Right side pain from arm pit to hip. Tender to the touch. Dealing with it for well over a month.        History of Present Illness  Patient complains of having right sided pain for approximately 1 month or just a little greater.  No known trauma.  She does work as a part-time  and lifts cases of beer.  He recalls no injury.  The pain is mostly located along the right sided mid thoracic ribs wrapping around over and to the abdomen area over the liver region.  There is no shortness of breath.  No cough.  There is no nausea or vomiting.  She is eating is normal.  Medicine she is tried have not really helped.  She has used a Voltaren cream.  Moving around does seem to make it worse.  Patient's PMR from outside medical facility reviewed and noted.    Review of Systems     Otherwise complete ROS reviewed and negative except as mentioned in the HPI.    Past Medical History:   Past Medical History:   Diagnosis Date   • Anxiety    • Depression    • Hyperlipidemia    • Liver enzyme elevation    • Sleep apnea      Past Surgical History:History reviewed. No pertinent surgical history.  Social History:  reports that she quit smoking about 2 years ago. Her smoking use included cigarettes. She has a 7.50 pack-year smoking history. She has never used smokeless tobacco. She reports current alcohol use of about 10.0 standard drinks of alcohol per week. She reports that she does not use drugs.    Family History: family history includes ADD / ADHD in her son; Arthritis in her mother and sister; Cancer in her mother and sister; Hyperlipidemia in her mother; Lupus in her brother, maternal aunt, and sister.       Allergies:  Allergies   Allergen Reactions   • Amoxicillin Diarrhea, Nausea Only and Other (See Comments)     Stomach cramping and fatigue   • Peanut-Containing Drug Products Shortness Of Breath   • Diclofenac Sodium Unknown - High  "Severity     Flushing and soa     Medications:  Prior to Admission medications    Medication Sig Start Date End Date Taking? Authorizing Provider   aspirin 81 MG tablet Take 81 mg by mouth Daily.   Yes Moshe Joshi MD   atorvastatin (LIPITOR) 40 MG tablet Take 1 tablet by mouth Daily. 10/27/20  Yes Jessica Fierro DO   fenofibrate (TRICOR) 145 MG tablet Take 1 tablet by mouth once daily 11/3/20  Yes Jessica Fierro DO   therapeutic multivitamin-minerals (THERAGRAN-M) tablet Take 1 tablet by mouth Daily.   Yes Moshe Joshi MD   b complex vitamins capsule Take 1 capsule by mouth Daily.    Moshe Joshi MD   PARoxetine (PAXIL) 20 MG tablet TAKE 1 TABLET BY MOUTH ONCE DAILY IN THE MORNING 5/19/20   Jessica Fierro DO   vitamin C (ASCORBIC ACID) 500 MG tablet Take 500 mg by mouth Daily.    Moshe Joshi MD       Objective     Vital Signs: /91 (BP Location: Left arm, Patient Position: Sitting, Cuff Size: Adult)   Pulse 93   Temp 98 °F (36.7 °C) (Skin)   Resp 18   Ht 157.5 cm (62\")   Wt 61.6 kg (135 lb 11.2 oz)   SpO2 99%   BMI 24.82 kg/m²   Physical Exam  Vitals signs and nursing note reviewed.   Constitutional:       Appearance: Normal appearance. She is well-developed and normal weight.   HENT:      Head: Normocephalic and atraumatic.      Right Ear: External ear normal.      Left Ear: External ear normal.      Nose: Nose normal.      Mouth/Throat:      Mouth: Mucous membranes are moist.      Pharynx: Oropharynx is clear.   Eyes:      General: No scleral icterus.        Right eye: No discharge.         Left eye: No discharge.      Extraocular Movements: Extraocular movements intact.      Conjunctiva/sclera: Conjunctivae normal.      Pupils: Pupils are equal, round, and reactive to light.   Neck:      Musculoskeletal: Normal range of motion and neck supple. No neck rigidity.      Thyroid: No thyromegaly.      Vascular: No JVD.      Trachea: No tracheal " deviation.   Cardiovascular:      Rate and Rhythm: Normal rate and regular rhythm.      Pulses: Normal pulses.      Heart sounds: Normal heart sounds. No murmur. No friction rub. No gallop.    Pulmonary:      Effort: Pulmonary effort is normal.      Breath sounds: Normal breath sounds.   Abdominal:      General: Bowel sounds are normal. There is no distension.      Palpations: Abdomen is soft.      Tenderness: There is no abdominal tenderness.   Musculoskeletal: Normal range of motion.      Comments: There is chest wall tenderness over the right posterior chest radiating around the ribs into the right upper abdomen region.  She does not have significant pain elicited with deep palpation of the abdomen.  There is some muscle tightness associated with the thoracic tenderness.   Lymphadenopathy:      Cervical: No cervical adenopathy.   Skin:     General: Skin is warm and dry.      Capillary Refill: Capillary refill takes less than 2 seconds.   Neurological:      General: No focal deficit present.      Mental Status: She is alert and oriented to person, place, and time.      Cranial Nerves: No cranial nerve deficit.      Coordination: Coordination normal.   Psychiatric:         Mood and Affect: Mood normal.         Behavior: Behavior normal.         Thought Content: Thought content normal.         Judgment: Judgment normal.         Patient's Body mass index is 24.82 kg/m².       Results Reviewed:  Glucose   Date Value Ref Range Status   10/21/2019 106 74 - 109 mg/dL Final     BUN   Date Value Ref Range Status   08/25/2020 10 8 - 27 mg/dL Final   10/21/2019 11 8 - 23 mg/dL Final     Creatinine   Date Value Ref Range Status   08/25/2020 0.52 (L) 0.57 - 1.00 mg/dL Final   10/21/2019 0.5 0.5 - 0.9 mg/dL Final     Sodium   Date Value Ref Range Status   08/25/2020 143 134 - 144 mmol/L Final   10/21/2019 141 136 - 145 mmol/L Final     Potassium   Date Value Ref Range Status   08/25/2020 4.0 3.5 - 5.2 mmol/L Final   10/21/2019  3.7 3.5 - 5.0 mmol/L Final     Chloride   Date Value Ref Range Status   08/25/2020 102 96 - 106 mmol/L Final   10/21/2019 102 98 - 111 mmol/L Final     CO2   Date Value Ref Range Status   10/21/2019 20 (L) 22 - 29 mmol/L Final     Total CO2   Date Value Ref Range Status   08/25/2020 25 20 - 29 mmol/L Final     Calcium   Date Value Ref Range Status   08/25/2020 9.7 8.7 - 10.3 mg/dL Final   10/21/2019 9.5 8.8 - 10.2 mg/dL Final     ALT (SGPT)   Date Value Ref Range Status   08/25/2020 24 0 - 32 IU/L Final   11/19/2019 35 (H) 5 - 33 U/L Final     AST (SGOT)   Date Value Ref Range Status   08/25/2020 19 0 - 40 IU/L Final   11/19/2019 28 5 - 32 U/L Final     WBC   Date Value Ref Range Status   05/22/2020 6.7 3.4 - 10.8 x10E3/uL Final   10/21/2019 6.2 4.8 - 10.8 K/uL Final     Hematocrit   Date Value Ref Range Status   05/22/2020 40.1 34.0 - 46.6 % Final   10/21/2019 45.7 37.0 - 47.0 % Final     Platelets   Date Value Ref Range Status   05/22/2020 240 150 - 450 x10E3/uL Final   10/21/2019 250 130 - 400 K/uL Final     Triglycerides   Date Value Ref Range Status   08/25/2020 185 (H) 0 - 149 mg/dL Final   10/21/2019 524 (H) 0 - 149 mg/dL Final     HDL Cholesterol   Date Value Ref Range Status   08/25/2020 65 >39 mg/dL Final   10/21/2019 75 65 - 121 mg/dL Final     Comment:     VALUES>60 MG/DL ARE ASSOCIATED WITH A DECREASED RISK OF  ATHEROSCLEROTIC CARDIOVASCULAR DISEASE     LDL Cholesterol    Date Value Ref Range Status   08/25/2020 109 (H) 0 - 99 mg/dL Final     Comment:     **Effective August 31, 2020, [facility] is implementing an improved**  equation to calculate Low Density Lipoprotein Cholesterol (LDL-C)  concentrations, to be used in all lipid panels that report calculated  LDL-C. This equation was developed through a collaboration with the  National Heart, Lung and Blood Institutes of Health (NIH).[1] The NIH  calculation overcomes the limitations of the existing Friedewald  LDL-C equation and performs equally well in  both fasting and  non-fasting individuals.  1. Chris M, Helen C, Leda Q, et al. A new equation for  calculation of low-density lipoprotein cholesterol in patients with  normolipidemia and/or hypertriglyceridemia. [employer].  2020 Feb 26. doi:10.1001/[email or web address]     10/21/2019 see below <100 mg/dL Final     Comment:     When the triglyceride is >400 mg/dL the calculated LDL and  VLDL are not valid.  <100 MG/DL=OPITIMAL    100-129 MG/DL=DESIRABLE    130-159 MG/DL BORDERLINE=INCREASED RISK OF ATHEROSCLEROTIC  CARDIOVASCULAR DISEASE    > OR = 160 MG/DL=ASSOCIATED WITH AN INCREASE RISK OF  ATHEROSCLEROTIC CARDIOVASCULAR DISEASE         Assessment / Plan     Assessment/Plan:  1. Chest wall pain    - CBC w AUTO Differential  - Comprehensive metabolic panel  - C-reactive protein  - XR Chest PA & Lateral (In Office) Negative  - XR Spine Thoracic 3 View (In Office)  - traMADol (ULTRAM) 50 MG tablet; Take 1 tablet by mouth Every 8 (Eight) Hours As Needed for Moderate Pain .  Dispense: 30 tablet; Refill: 1    2. Thoracic degenerative disc disease    lidocaine patch on 12 hours off 12 hours    Return in about 2 weeks (around 2/9/2021). unless patient needs to be seen sooner or acute issues arise.      I have discussed the patient results/orders and and plan/recommendation with them at today's visit.      Jessica Fierro,    01/26/2021

## 2021-01-26 NOTE — TELEPHONE ENCOUNTER
----- Message from Jessica Fierro DO sent at 1/26/2021  1:06 PM CST -----  Chest xray ok  Thoracic spine with ddd and arthritis

## 2021-01-27 LAB
ALBUMIN SERPL-MCNC: 4.5 G/DL (ref 3.8–4.8)
ALBUMIN/GLOB SERPL: 1.9 {RATIO} (ref 1.2–2.2)
ALP SERPL-CCNC: 119 IU/L (ref 39–117)
ALT SERPL-CCNC: 30 IU/L (ref 0–32)
AST SERPL-CCNC: 19 IU/L (ref 0–40)
BASOPHILS # BLD AUTO: 0.1 X10E3/UL (ref 0–0.2)
BASOPHILS NFR BLD AUTO: 1 %
BILIRUB SERPL-MCNC: 0.3 MG/DL (ref 0–1.2)
BUN SERPL-MCNC: 10 MG/DL (ref 8–27)
BUN/CREAT SERPL: 17 (ref 12–28)
CALCIUM SERPL-MCNC: 9.6 MG/DL (ref 8.7–10.3)
CHLORIDE SERPL-SCNC: 103 MMOL/L (ref 96–106)
CO2 SERPL-SCNC: 22 MMOL/L (ref 20–29)
CREAT SERPL-MCNC: 0.58 MG/DL (ref 0.57–1)
CRP SERPL-MCNC: 3 MG/L (ref 0–10)
EOSINOPHIL # BLD AUTO: 0.1 X10E3/UL (ref 0–0.4)
EOSINOPHIL NFR BLD AUTO: 2 %
ERYTHROCYTE [DISTWIDTH] IN BLOOD BY AUTOMATED COUNT: 13.3 % (ref 11.7–15.4)
GLOBULIN SER CALC-MCNC: 2.4 G/DL (ref 1.5–4.5)
GLUCOSE SERPL-MCNC: 108 MG/DL (ref 65–99)
HCT VFR BLD AUTO: 38.3 % (ref 34–46.6)
HGB BLD-MCNC: 13 G/DL (ref 11.1–15.9)
IMM GRANULOCYTES # BLD AUTO: 0 X10E3/UL (ref 0–0.1)
IMM GRANULOCYTES NFR BLD AUTO: 0 %
LYMPHOCYTES # BLD AUTO: 1.8 X10E3/UL (ref 0.7–3.1)
LYMPHOCYTES NFR BLD AUTO: 34 %
MCH RBC QN AUTO: 30.7 PG (ref 26.6–33)
MCHC RBC AUTO-ENTMCNC: 33.9 G/DL (ref 31.5–35.7)
MCV RBC AUTO: 91 FL (ref 79–97)
MONOCYTES # BLD AUTO: 0.4 X10E3/UL (ref 0.1–0.9)
MONOCYTES NFR BLD AUTO: 8 %
NEUTROPHILS # BLD AUTO: 2.9 X10E3/UL (ref 1.4–7)
NEUTROPHILS NFR BLD AUTO: 55 %
PLATELET # BLD AUTO: 276 X10E3/UL (ref 150–450)
POTASSIUM SERPL-SCNC: 4.1 MMOL/L (ref 3.5–5.2)
PROT SERPL-MCNC: 6.9 G/DL (ref 6–8.5)
RBC # BLD AUTO: 4.23 X10E6/UL (ref 3.77–5.28)
SODIUM SERPL-SCNC: 140 MMOL/L (ref 134–144)
WBC # BLD AUTO: 5.3 X10E3/UL (ref 3.4–10.8)

## 2021-01-30 DIAGNOSIS — R10.11 RUQ PAIN: Primary | ICD-10-CM

## 2021-02-02 ENCOUNTER — TELEPHONE (OUTPATIENT)
Dept: INTERNAL MEDICINE | Facility: CLINIC | Age: 66
End: 2021-02-02

## 2021-02-02 NOTE — TELEPHONE ENCOUNTER
----- Message from Jessica Fierro DO sent at 1/30/2021 11:48 AM CST -----  Labs are normal except the alk phos slightly elevated    Recommend liver US

## 2021-02-02 NOTE — TELEPHONE ENCOUNTER
Called patient to inform her of lab results and recommendations per Dr. Fierro. Patient voiced understanding and scheduled an appointment for a liver US.

## 2021-02-02 NOTE — TELEPHONE ENCOUNTER
Attempted to call patient. No answer. Left voicemail with normal xray results and advised patient to call for further questions.

## 2021-02-05 ENCOUNTER — TELEPHONE (OUTPATIENT)
Dept: INTERNAL MEDICINE | Facility: CLINIC | Age: 66
End: 2021-02-05

## 2021-02-05 NOTE — TELEPHONE ENCOUNTER
Caller: Cop[place] Karen L    Relationship: Self    Best call back number: [phone]     What medications are you currently taking:   Current Outpatient Medications on File Prior to Visit   Medication Sig Dispense Refill   • aspirin 81 MG tablet Take 81 mg by mouth Daily.     • atorvastatin (LIPITOR) 40 MG tablet Take 1 tablet by mouth Daily. 30 tablet 3   • b complex vitamins capsule Take 1 capsule by mouth Daily.     • fenofibrate (TRICOR) 145 MG tablet Take 1 tablet by mouth once daily 30 tablet 3   • lidocaine (LIDODERM) 5 % Place 1 patch on the skin as directed by provider Daily. Remove & Discard patch within 12 hours or as directed by MD 30 patch 2   • PARoxetine (PAXIL) 20 MG tablet TAKE 1 TABLET BY MOUTH ONCE DAILY IN THE MORNING 30 tablet 3   • therapeutic multivitamin-minerals (THERAGRAN-M) tablet Take 1 tablet by mouth Daily.     • traMADol (ULTRAM) 50 MG tablet Take 1 tablet by mouth Every 8 (Eight) Hours As Needed for Moderate Pain . 30 tablet 1   • vitamin C (ASCORBIC ACID) 500 MG tablet Take 500 mg by mouth Daily.       No current facility-administered medications on file prior to visit.         Which medication are you concerned about:   fenofibrate (TRICOR) 145 MG tablet    Who prescribed you this medication: DR. ARAUZ    What are your concerns: PHARMACY NEEDS THE OKAY TO REFILL

## 2021-02-05 NOTE — TELEPHONE ENCOUNTER
Called patient to let her know I spoke with the pharmacy and her script is ready to be picked up. Patient voiced understanding and had no further questions.

## 2021-02-09 ENCOUNTER — TELEPHONE (OUTPATIENT)
Dept: INTERNAL MEDICINE | Facility: CLINIC | Age: 66
End: 2021-02-09

## 2021-02-09 NOTE — TELEPHONE ENCOUNTER
----- Message from Jessica Fierro DO sent at 2/8/2021  1:17 PM CST -----  Fatty liver   No acute abnormality

## 2021-02-09 NOTE — TELEPHONE ENCOUNTER
Attempted to call patient to discuss liver US. No answer. Left voicemail explaining results per Dr. Fierro. Advised patient to call with any further questions.

## 2021-02-23 DIAGNOSIS — R07.89 CHEST WALL PAIN: ICD-10-CM

## 2021-02-23 NOTE — TELEPHONE ENCOUNTER
PATIENT CALLED STATAING SHE HAVING ANXIETY AGAIN SHE HAS BEEN OFF HER PARoxetine (PAXIL) 20 MG tablet   LAST JUN 2020 WAS WANTING TO START TAKING IT AGAIN STATE SHE HAS A WHOLE BOTTLE AT HER HOUSE WANTS TO KNOW RECOMEDATION   PLEASE ADVISE: [id number]

## 2021-02-24 ENCOUNTER — TELEPHONE (OUTPATIENT)
Dept: INTERNAL MEDICINE | Facility: CLINIC | Age: 66
End: 2021-02-24

## 2021-02-24 RX ORDER — TRAMADOL HYDROCHLORIDE 50 MG/1
50 TABLET ORAL EVERY 8 HOURS PRN
Qty: 30 TABLET | Refills: 1 | Status: SHIPPED | OUTPATIENT
Start: 2021-02-24 | End: 2021-03-18 | Stop reason: SDUPTHER

## 2021-02-24 NOTE — TELEPHONE ENCOUNTER
Pharmacy Name:  [facility]    Pharmacy representative name: TAMMY    Pharmacy representative phone number: [phone]    What medication are you calling in regards to: TRAMADOL     What question does the pharmacy have: NEEDS CLARIFCATION ON DIAGNOSIS     Who is the provider that prescribed the medication: DR. ADRIA ARAUZ

## 2021-02-24 NOTE — TELEPHONE ENCOUNTER
Attempted to call patient to inform her that she can take the paxil 20 mg daily. No answer. Left voicemail and encouraged patient to return call with any further questions.

## 2021-02-26 ENCOUNTER — TELEPHONE (OUTPATIENT)
Dept: INTERNAL MEDICINE | Facility: CLINIC | Age: 66
End: 2021-02-26

## 2021-02-26 DIAGNOSIS — E78.1 HIGH TRIGLYCERIDES: ICD-10-CM

## 2021-02-26 RX ORDER — FENOFIBRATE 145 MG/1
145 TABLET, COATED ORAL DAILY
Qty: 90 TABLET | Refills: 0 | Status: CANCELLED | OUTPATIENT
Start: 2021-02-26

## 2021-03-02 NOTE — TELEPHONE ENCOUNTER
"Returned call to patient. She was concerned because she received something in the mail from her insurance company that she assumes is a self administered covid test. She wanted to know if we had encountered anything like that and what she should do. I advised her to contact her insurance company for better guidance and the patient agreed. Patient also states she had a potential exposure at work but has self quarantines as her workplace has \"shut down\" for 10 days. Patient states she has no symptoms but wondered if she should get a test. I advised that we could perform the test here with her in her vehicle if she so chooses. She voiced understanding and had no further questions.   "

## 2021-03-18 DIAGNOSIS — R07.89 CHEST WALL PAIN: ICD-10-CM

## 2021-03-19 RX ORDER — TRAMADOL HYDROCHLORIDE 50 MG/1
50 TABLET ORAL EVERY 8 HOURS PRN
Qty: 30 TABLET | Refills: 1 | Status: SHIPPED | OUTPATIENT
Start: 2021-03-19 | End: 2021-04-04 | Stop reason: SDUPTHER

## 2021-04-04 DIAGNOSIS — R07.89 CHEST WALL PAIN: ICD-10-CM

## 2021-04-06 RX ORDER — TRAMADOL HYDROCHLORIDE 50 MG/1
50 TABLET ORAL EVERY 8 HOURS PRN
Qty: 30 TABLET | Refills: 1 | Status: SHIPPED | OUTPATIENT
Start: 2021-04-06 | End: 2021-05-07 | Stop reason: SDUPTHER

## 2021-04-06 NOTE — TELEPHONE ENCOUNTER
Pt states pain is very bad on left hip, but also hurts on right side of body. States it is hard for her to walk because it is popping. Has an appointment with you the 9th.

## 2021-04-09 ENCOUNTER — OFFICE VISIT (OUTPATIENT)
Dept: INTERNAL MEDICINE | Facility: CLINIC | Age: 66
End: 2021-04-09

## 2021-04-09 VITALS
OXYGEN SATURATION: 99 % | SYSTOLIC BLOOD PRESSURE: 131 MMHG | HEIGHT: 62 IN | DIASTOLIC BLOOD PRESSURE: 78 MMHG | TEMPERATURE: 98 F | BODY MASS INDEX: 24.24 KG/M2 | WEIGHT: 131.7 LBS | RESPIRATION RATE: 18 BRPM | HEART RATE: 89 BPM

## 2021-04-09 DIAGNOSIS — R07.81 RIB PAIN ON RIGHT SIDE: ICD-10-CM

## 2021-04-09 DIAGNOSIS — S76.012A MUSCLE STRAIN OF GLUTEAL REGION, LEFT, INITIAL ENCOUNTER: Primary | ICD-10-CM

## 2021-04-09 PROCEDURE — 99213 OFFICE O/P EST LOW 20 MIN: CPT | Performed by: FAMILY MEDICINE

## 2021-04-09 RX ORDER — PREDNISONE 10 MG/1
TABLET ORAL
Qty: 30 TABLET | Refills: 0 | Status: SHIPPED | OUTPATIENT
Start: 2021-04-09 | End: 2021-11-16

## 2021-04-09 NOTE — PROGRESS NOTES
Subjective     Chief Complaint   Patient presents with   • Hip Pain     Left side popping Symptoms began 2 weeks ago.    • Flank Pain     Right side.        History of Present Illness  Patient works as a  2 days/week.  She started having problems with her left lower buttock wrapping around to her trochanter hurting.  It is worse with activity.  She has had no trauma.  Tends to be worse when she is working.  She has been doing some yard work.  She thinks this may have aggravated it.  She is having the continued right flank discomfort that she describes as a tingling.  The pain medicine does help some.  She tries not to take it very often.  She prefers to take Tylenol or ibuprofen.  Patient's PMR from outside medical facility reviewed and noted.    Review of Systems     Otherwise complete ROS reviewed and negative except as mentioned in the HPI.    Past Medical History:   Past Medical History:   Diagnosis Date   • Anxiety    • Depression    • Hyperlipidemia    • Liver enzyme elevation    • Sleep apnea      Past Surgical History:History reviewed. No pertinent surgical history.  Social History:  reports that she quit smoking about 2 years ago. Her smoking use included cigarettes. She has a 7.50 pack-year smoking history. She has never used smokeless tobacco. She reports current alcohol use of about 10.0 standard drinks of alcohol per week. She reports that she does not use drugs.    Family History: family history includes ADD / ADHD in her son; Arthritis in her mother and sister; Cancer in her mother and sister; Hyperlipidemia in her mother; Lupus in her brother, maternal aunt, and sister.       Allergies:  Allergies   Allergen Reactions   • Amoxicillin Diarrhea, Nausea Only and Other (See Comments)     Stomach cramping and fatigue   • Peanut-Containing Drug Products Shortness Of Breath   • Diclofenac Sodium Unknown - High Severity     Flushing and soa     Medications:  Prior to Admission medications   "  Medication Sig Start Date End Date Taking? Authorizing Provider   aspirin 81 MG tablet Take 81 mg by mouth Daily.   Yes Moshe Joshi MD   atorvastatin (LIPITOR) 40 MG tablet Take 1 tablet by mouth Daily. 10/27/20  Yes Jessica Fierro DO   b complex vitamins capsule Take 1 capsule by mouth Daily.   Yes Moshe Joshi MD   fenofibrate (TRICOR) 145 MG tablet Take 1 tablet by mouth once daily 11/3/20  Yes Jessica Fierro DO   PARoxetine (PAXIL) 20 MG tablet TAKE 1 TABLET BY MOUTH ONCE DAILY IN THE MORNING 5/19/20  Yes Jessica Fierro DO   therapeutic multivitamin-minerals (THERAGRAN-M) tablet Take 1 tablet by mouth Daily.   Yes Moshe Joshi MD   traMADol (ULTRAM) 50 MG tablet Take 1 tablet by mouth Every 8 (Eight) Hours As Needed for Moderate Pain . 4/6/21  Yes Jessica Fierro DO   vitamin C (ASCORBIC ACID) 500 MG tablet Take 500 mg by mouth Daily.   Yes Moshe Joshi MD   lidocaine (LIDODERM) 5 % Place 1 patch on the skin as directed by provider Daily. Remove & Discard patch within 12 hours or as directed by MD 1/26/21   Jessica Fierro DO       Objective     Vital Signs: /78 (BP Location: Left arm, Patient Position: Sitting, Cuff Size: Small Adult)   Pulse 89   Temp 98 °F (36.7 °C) (Skin)   Resp 18   Ht 157.5 cm (62\")   Wt 59.7 kg (131 lb 11.2 oz)   SpO2 99%   BMI 24.09 kg/m²   Physical Exam  Vitals and nursing note reviewed.   Constitutional:       General: She is not in acute distress.     Appearance: Normal appearance.   HENT:      Head: Normocephalic and atraumatic.      Right Ear: External ear normal.      Left Ear: External ear normal.      Nose: Nose normal.      Mouth/Throat:      Mouth: Mucous membranes are moist.   Eyes:      Extraocular Movements: Extraocular movements intact.      Pupils: Pupils are equal, round, and reactive to light.   Cardiovascular:      Rate and Rhythm: Normal rate and regular rhythm.      Pulses: Normal pulses. "   Abdominal:      General: Bowel sounds are normal.   Musculoskeletal:         General: Normal range of motion.      Cervical back: Normal range of motion.      Comments: Patient has pain with palpation in the left lower buttock region.  It wraps to the greater trochanter.  She is exquisitely tender to palpation.  There is no bruising.   Skin:     General: Skin is warm and dry.      Capillary Refill: Capillary refill takes less than 2 seconds.   Neurological:      General: No focal deficit present.      Mental Status: She is alert and oriented to person, place, and time.   Psychiatric:         Mood and Affect: Mood normal.         Behavior: Behavior normal.         Thought Content: Thought content normal.         Judgment: Judgment normal.             Results Reviewed:  Glucose   Date Value Ref Range Status   10/21/2019 106 74 - 109 mg/dL Final     BUN   Date Value Ref Range Status   01/26/2021 10 8 - 27 mg/dL Final   10/21/2019 11 8 - 23 mg/dL Final     Creatinine   Date Value Ref Range Status   01/26/2021 0.58 0.57 - 1.00 mg/dL Final   10/21/2019 0.5 0.5 - 0.9 mg/dL Final     Sodium   Date Value Ref Range Status   01/26/2021 140 134 - 144 mmol/L Final   10/21/2019 141 136 - 145 mmol/L Final     Potassium   Date Value Ref Range Status   01/26/2021 4.1 3.5 - 5.2 mmol/L Final   10/21/2019 3.7 3.5 - 5.0 mmol/L Final     Chloride   Date Value Ref Range Status   01/26/2021 103 96 - 106 mmol/L Final   10/21/2019 102 98 - 111 mmol/L Final     CO2   Date Value Ref Range Status   10/21/2019 20 (L) 22 - 29 mmol/L Final     Total CO2   Date Value Ref Range Status   01/26/2021 22 20 - 29 mmol/L Final     Calcium   Date Value Ref Range Status   01/26/2021 9.6 8.7 - 10.3 mg/dL Final   10/21/2019 9.5 8.8 - 10.2 mg/dL Final     ALT (SGPT)   Date Value Ref Range Status   01/26/2021 30 0 - 32 IU/L Final   11/19/2019 35 (H) 5 - 33 U/L Final     AST (SGOT)   Date Value Ref Range Status   01/26/2021 19 0 - 40 IU/L Final   11/19/2019 28  5 - 32 U/L Final     WBC   Date Value Ref Range Status   01/26/2021 5.3 3.4 - 10.8 x10E3/uL Final   10/21/2019 6.2 4.8 - 10.8 K/uL Final     Hematocrit   Date Value Ref Range Status   01/26/2021 38.3 34.0 - 46.6 % Final   10/21/2019 45.7 37.0 - 47.0 % Final     Platelets   Date Value Ref Range Status   01/26/2021 276 150 - 450 x10E3/uL Final   10/21/2019 250 130 - 400 K/uL Final     Triglycerides   Date Value Ref Range Status   08/25/2020 185 (H) 0 - 149 mg/dL Final   10/21/2019 524 (H) 0 - 149 mg/dL Final     HDL Cholesterol   Date Value Ref Range Status   08/25/2020 65 >39 mg/dL Final   10/21/2019 75 65 - 121 mg/dL Final     Comment:     VALUES>60 MG/DL ARE ASSOCIATED WITH A DECREASED RISK OF  ATHEROSCLEROTIC CARDIOVASCULAR DISEASE     LDL Cholesterol    Date Value Ref Range Status   08/25/2020 109 (H) 0 - 99 mg/dL Final     Comment:     **Effective August 31, 2020, [facility] is implementing an improved**  equation to calculate Low Density Lipoprotein Cholesterol (LDL-C)  concentrations, to be used in all lipid panels that report calculated  LDL-C. This equation was developed through a collaboration with the  National Heart, Lung and Blood Institutes of Health (NIH).[1] The NIH  calculation overcomes the limitations of the existing Friedewald  LDL-C equation and performs equally well in both fasting and  non-fasting individuals.  1. Chris M, Helen C, Leda Q, et al. A new equation for  calculation of low-density lipoprotein cholesterol in patients with  normolipidemia and/or hypertriglyceridemia. [employer]iol.  2020 Feb 26. doi:10.1001/jamacardio.2020.0013     10/21/2019 see below <100 mg/dL Final     Comment:     When the triglyceride is >400 mg/dL the calculated LDL and  VLDL are not valid.  <100 MG/DL=OPITIMAL    100-129 MG/DL=DESIRABLE    130-159 MG/DL BORDERLINE=INCREASED RISK OF ATHEROSCLEROTIC  CARDIOVASCULAR DISEASE    > OR = 160 MG/DL=ASSOCIATED WITH AN INCREASE RISK OF  ATHEROSCLEROTIC CARDIOVASCULAR  DISEASE         Assessment / Plan     Assessment/Plan:  1. Muscle strain of gluteal region, left, initial encounter  Use ibuprofen as well as tramadol.  Heat or ice.  Monitor for changes or worsening.  Steroid taper dose.    2. Rib pain on right side  The descriptor of this pain on the right rib region now is more of a paresthetic type of pain.  I would recommend Neurontin for this.  At this time however would like to hold on giving her Neurontin because we have given her steroids as well as encouraged her to take her tramadol.  We do not wish to have her oversedated.    She is to follow-up if there is no improvement.  And she is to keep her neck scheduled appointment.        Return if symptoms worsen or fail to improve, for Keep next scheduled appointment. unless patient needs to be seen sooner or acute issues arise.  I have discussed the patient results/orders and and plan/recommendation with them at today's visit.      Jessica Fierro,    04/09/2021

## 2021-04-14 DIAGNOSIS — E78.00 HIGH CHOLESTEROL: ICD-10-CM

## 2021-04-15 RX ORDER — ATORVASTATIN CALCIUM 40 MG/1
TABLET, FILM COATED ORAL
Qty: 90 TABLET | Refills: 0 | Status: SHIPPED | OUTPATIENT
Start: 2021-04-15 | End: 2021-08-05

## 2021-05-07 DIAGNOSIS — R07.89 CHEST WALL PAIN: ICD-10-CM

## 2021-05-08 RX ORDER — TRAMADOL HYDROCHLORIDE 50 MG/1
50 TABLET ORAL EVERY 8 HOURS PRN
Qty: 30 TABLET | Refills: 1 | Status: SHIPPED | OUTPATIENT
Start: 2021-05-08 | End: 2021-08-17

## 2021-08-03 ENCOUNTER — OFFICE VISIT (OUTPATIENT)
Dept: INTERNAL MEDICINE | Facility: CLINIC | Age: 66
End: 2021-08-03

## 2021-08-03 VITALS
SYSTOLIC BLOOD PRESSURE: 121 MMHG | DIASTOLIC BLOOD PRESSURE: 78 MMHG | HEIGHT: 62 IN | WEIGHT: 123.2 LBS | TEMPERATURE: 98.2 F | OXYGEN SATURATION: 99 % | HEART RATE: 88 BPM | RESPIRATION RATE: 17 BRPM | BODY MASS INDEX: 22.67 KG/M2

## 2021-08-03 DIAGNOSIS — R63.4 RECENT UNEXPLAINED WEIGHT LOSS: Primary | ICD-10-CM

## 2021-08-03 DIAGNOSIS — M54.6 ACUTE BILATERAL THORACIC BACK PAIN: ICD-10-CM

## 2021-08-03 DIAGNOSIS — R07.9 CHEST PAIN, UNSPECIFIED TYPE: ICD-10-CM

## 2021-08-03 PROCEDURE — 99214 OFFICE O/P EST MOD 30 MIN: CPT | Performed by: FAMILY MEDICINE

## 2021-08-03 RX ORDER — UBIDECARENONE 50 MG
50 CAPSULE ORAL DAILY
COMMUNITY

## 2021-08-03 NOTE — PROGRESS NOTES
Subjective     Chief Complaint   Patient presents with   • Weight Loss     6 lbs in 7 days.    • Generalized Body Aches     Pain for one month. From back, wraps around rib cage to upper abdomin       History of Present Illness  Loosing weight over the last week 6 pounds.  Tired  Pain in torso.    Patient's PMR from outside medical facility reviewed and noted.    Review of Systems     Otherwise complete ROS reviewed and negative except as mentioned in the HPI.    Past Medical History:   Past Medical History:   Diagnosis Date   • Anxiety    • Depression    • Hyperlipidemia    • Liver enzyme elevation    • Sleep apnea      Past Surgical History:History reviewed. No pertinent surgical history.  Social History:  reports that she quit smoking about 2 years ago. Her smoking use included cigarettes. She has a 7.50 pack-year smoking history. She has never used smokeless tobacco. She reports current alcohol use of about 10.0 standard drinks of alcohol per week. She reports that she does not use drugs.    Family History: family history includes ADD / ADHD in her son; Arthritis in her mother and sister; Cancer in her mother and sister; Hyperlipidemia in her mother; Lupus in her brother, maternal aunt, and sister.       Allergies:  Allergies   Allergen Reactions   • Amoxicillin Diarrhea, Nausea Only and Other (See Comments)     Stomach cramping and fatigue   • Peanut-Containing Drug Products Shortness Of Breath   • Diclofenac Sodium Unknown - High Severity     Flushing and soa     Medications:  Prior to Admission medications    Medication Sig Start Date End Date Taking? Authorizing Provider   aspirin 81 MG tablet Take 81 mg by mouth Daily.   Yes Moshe Joshi MD   atorvastatin (LIPITOR) 40 MG tablet Take 1 tablet by mouth once daily 4/15/21  Yes Jessica Fierro DO   b complex vitamins capsule Take 1 capsule by mouth Daily.   Yes Moshe Joshi MD   coenzyme Q10 50 MG capsule capsule Take 50 mg by  "mouth Daily.   Yes Provider, MD Moshe   fenofibrate (TRICOR) 145 MG tablet Take 1 tablet by mouth once daily 11/3/20  Yes Jessica Fierro DO   therapeutic multivitamin-minerals (THERAGRAN-M) tablet Take 1 tablet by mouth Daily.   Yes ProviderMoshe MD   traMADol (ULTRAM) 50 MG tablet Take 1 tablet by mouth Every 8 (Eight) Hours As Needed for Moderate Pain . 5/8/21  Yes Jessica Fierro DO   vitamin C (ASCORBIC ACID) 500 MG tablet Take 500 mg by mouth Daily.   Yes Provider, MD Moshe   PARoxetine (PAXIL) 20 MG tablet TAKE 1 TABLET BY MOUTH ONCE DAILY IN THE MORNING 5/19/20   Jessica Fierro DO   predniSONE (DELTASONE) 10 MG tablet 4 daily x 3 then 3 daily x 3 then 2 daily x 3 then 1 daily x 3 then stop 4/9/21   Jessica Fierro DO       Objective     Vital Signs: /78 (BP Location: Left arm, Patient Position: Sitting, Cuff Size: Adult)   Pulse 88   Temp 98.2 °F (36.8 °C) (Skin)   Resp 17   Ht 157.5 cm (62\")   Wt 55.9 kg (123 lb 3.2 oz)   SpO2 99%   BMI 22.53 kg/m²   Physical Exam  Vitals and nursing note reviewed.   Constitutional:       Appearance: Normal appearance. She is normal weight. She is not ill-appearing, toxic-appearing or diaphoretic.   HENT:      Head: Normocephalic and atraumatic.      Right Ear: Tympanic membrane, ear canal and external ear normal.      Left Ear: Tympanic membrane, ear canal and external ear normal.      Nose: Nose normal.      Mouth/Throat:      Mouth: Mucous membranes are moist.      Pharynx: Oropharynx is clear.   Eyes:      Extraocular Movements: Extraocular movements intact.      Conjunctiva/sclera: Conjunctivae normal.      Pupils: Pupils are equal, round, and reactive to light.   Cardiovascular:      Rate and Rhythm: Normal rate and regular rhythm.      Pulses: Normal pulses.      Heart sounds: Normal heart sounds.   Pulmonary:      Effort: Pulmonary effort is normal.      Breath sounds: Normal breath sounds.      Comments: Chest " wall ttp.  Abdominal:      General: Bowel sounds are normal.      Palpations: Abdomen is soft.   Musculoskeletal:         General: No swelling. Normal range of motion.      Cervical back: Normal range of motion and neck supple.   Skin:     General: Skin is warm and dry.      Capillary Refill: Capillary refill takes less than 2 seconds.   Neurological:      General: No focal deficit present.      Mental Status: She is alert and oriented to person, place, and time.   Psychiatric:         Mood and Affect: Mood normal.         Behavior: Behavior normal.         Patient's Body mass index is 22.53 kg/m². indicating that she is within normal range (BMI 18.5-24.9). No BMI management plan needed..      Results Reviewed:  Glucose   Date Value Ref Range Status   10/21/2019 106 74 - 109 mg/dL Final     BUN   Date Value Ref Range Status   01/26/2021 10 8 - 27 mg/dL Final   10/21/2019 11 8 - 23 mg/dL Final     Creatinine   Date Value Ref Range Status   01/26/2021 0.58 0.57 - 1.00 mg/dL Final   10/21/2019 0.5 0.5 - 0.9 mg/dL Final     Sodium   Date Value Ref Range Status   01/26/2021 140 134 - 144 mmol/L Final   10/21/2019 141 136 - 145 mmol/L Final     Potassium   Date Value Ref Range Status   01/26/2021 4.1 3.5 - 5.2 mmol/L Final   10/21/2019 3.7 3.5 - 5.0 mmol/L Final     Chloride   Date Value Ref Range Status   01/26/2021 103 96 - 106 mmol/L Final   10/21/2019 102 98 - 111 mmol/L Final     CO2   Date Value Ref Range Status   10/21/2019 20 (L) 22 - 29 mmol/L Final     Total CO2   Date Value Ref Range Status   01/26/2021 22 20 - 29 mmol/L Final     Calcium   Date Value Ref Range Status   01/26/2021 9.6 8.7 - 10.3 mg/dL Final   10/21/2019 9.5 8.8 - 10.2 mg/dL Final     ALT (SGPT)   Date Value Ref Range Status   01/26/2021 30 0 - 32 IU/L Final   11/19/2019 35 (H) 5 - 33 U/L Final     AST (SGOT)   Date Value Ref Range Status   01/26/2021 19 0 - 40 IU/L Final   11/19/2019 28 5 - 32 U/L Final     WBC   Date Value Ref Range Status    01/26/2021 5.3 3.4 - 10.8 x10E3/uL Final   10/21/2019 6.2 4.8 - 10.8 K/uL Final     Hematocrit   Date Value Ref Range Status   01/26/2021 38.3 34.0 - 46.6 % Final   10/21/2019 45.7 37.0 - 47.0 % Final     Platelets   Date Value Ref Range Status   01/26/2021 276 150 - 450 x10E3/uL Final   10/21/2019 250 130 - 400 K/uL Final     Triglycerides   Date Value Ref Range Status   08/25/2020 185 (H) 0 - 149 mg/dL Final   10/21/2019 524 (H) 0 - 149 mg/dL Final     HDL Cholesterol   Date Value Ref Range Status   08/25/2020 65 >39 mg/dL Final   10/21/2019 75 65 - 121 mg/dL Final     Comment:     VALUES>60 MG/DL ARE ASSOCIATED WITH A DECREASED RISK OF  ATHEROSCLEROTIC CARDIOVASCULAR DISEASE     LDL Cholesterol    Date Value Ref Range Status   08/25/2020 109 (H) 0 - 99 mg/dL Final     Comment:     **Effective August 31, 2020, [facility] is implementing an improved**  equation to calculate Low Density Lipoprotein Cholesterol (LDL-C)  concentrations, to be used in all lipid panels that report calculated  LDL-C. This equation was developed through a collaboration with the  National Heart, Lung and Blood Institutes of Health (NIH).[1] The NIH  calculation overcomes the limitations of the existing Friedewald  LDL-C equation and performs equally well in both fasting and  non-fasting individuals.  1. Chris M, Helen C, Leda Q, et al. A new equation for  calculation of low-density lipoprotein cholesterol in patients with  normolipidemia and/or hypertriglyceridemia. [employer]iol.  2020 Feb 26. doi:10.1001/jamacardio.2020.0013     10/21/2019 see below <100 mg/dL Final     Comment:     When the triglyceride is >400 mg/dL the calculated LDL and  VLDL are not valid.  <100 MG/DL=OPITIMAL    100-129 MG/DL=DESIRABLE    130-159 MG/DL BORDERLINE=INCREASED RISK OF ATHEROSCLEROTIC  CARDIOVASCULAR DISEASE    > OR = 160 MG/DL=ASSOCIATED WITH AN INCREASE RISK OF  ATHEROSCLEROTIC CARDIOVASCULAR DISEASE         Assessment / Plan     Assessment/Plan:  1.  Recent unexplained weight loss    - Comprehensive metabolic panel  - CBC w AUTO Differential  - TSH  - T4  - C-reactive protein  - RIVAS    2. Acute bilateral thoracic back pain      3. Chest pain, unspecified type      If labs are negative then will need chest imaging      Return in about 2 years (around 8/3/2023). unless patient needs to be seen sooner or acute issues arise.        I have discussed the patient results/orders and and plan/recommendation with them at today's visit.      Jessica Fierro,    08/03/2021         normal (ped)...

## 2021-08-04 DIAGNOSIS — R07.9 CHEST PAIN, UNSPECIFIED TYPE: Primary | ICD-10-CM

## 2021-08-04 DIAGNOSIS — R07.81 RIB PAIN: Primary | ICD-10-CM

## 2021-08-04 LAB
ALBUMIN SERPL-MCNC: 4.5 G/DL (ref 3.8–4.8)
ALBUMIN/GLOB SERPL: 1.9 {RATIO} (ref 1.2–2.2)
ALP SERPL-CCNC: 100 IU/L (ref 48–121)
ALT SERPL-CCNC: 17 IU/L (ref 0–32)
ANA SER QL: POSITIVE
AST SERPL-CCNC: 16 IU/L (ref 0–40)
BASOPHILS # BLD AUTO: 0 X10E3/UL (ref 0–0.2)
BASOPHILS NFR BLD AUTO: 1 %
BILIRUB SERPL-MCNC: 0.4 MG/DL (ref 0–1.2)
BUN SERPL-MCNC: 8 MG/DL (ref 8–27)
BUN/CREAT SERPL: 13 (ref 12–28)
CALCIUM SERPL-MCNC: 9.8 MG/DL (ref 8.7–10.3)
CHLORIDE SERPL-SCNC: 105 MMOL/L (ref 96–106)
CO2 SERPL-SCNC: 21 MMOL/L (ref 20–29)
CREAT SERPL-MCNC: 0.6 MG/DL (ref 0.57–1)
CRP SERPL-MCNC: 1 MG/L (ref 0–10)
DSDNA AB SER-ACNC: 5 IU/ML (ref 0–9)
EOSINOPHIL # BLD AUTO: 0.1 X10E3/UL (ref 0–0.4)
EOSINOPHIL NFR BLD AUTO: 2 %
ERYTHROCYTE [DISTWIDTH] IN BLOOD BY AUTOMATED COUNT: 12.9 % (ref 11.7–15.4)
GLOBULIN SER CALC-MCNC: 2.4 G/DL (ref 1.5–4.5)
GLUCOSE SERPL-MCNC: 105 MG/DL (ref 65–99)
HCT VFR BLD AUTO: 39.9 % (ref 34–46.6)
HGB BLD-MCNC: 13.3 G/DL (ref 11.1–15.9)
IMM GRANULOCYTES # BLD AUTO: 0 X10E3/UL (ref 0–0.1)
IMM GRANULOCYTES NFR BLD AUTO: 0 %
LYMPHOCYTES # BLD AUTO: 1.7 X10E3/UL (ref 0.7–3.1)
LYMPHOCYTES NFR BLD AUTO: 32 %
Lab: NORMAL
MCH RBC QN AUTO: 30.4 PG (ref 26.6–33)
MCHC RBC AUTO-ENTMCNC: 33.3 G/DL (ref 31.5–35.7)
MCV RBC AUTO: 91 FL (ref 79–97)
MONOCYTES # BLD AUTO: 0.3 X10E3/UL (ref 0.1–0.9)
MONOCYTES NFR BLD AUTO: 6 %
NEUTROPHILS # BLD AUTO: 3.1 X10E3/UL (ref 1.4–7)
NEUTROPHILS NFR BLD AUTO: 59 %
PLATELET # BLD AUTO: 300 X10E3/UL (ref 150–450)
POTASSIUM SERPL-SCNC: 4 MMOL/L (ref 3.5–5.2)
PROT SERPL-MCNC: 6.9 G/DL (ref 6–8.5)
RBC # BLD AUTO: 4.38 X10E6/UL (ref 3.77–5.28)
SODIUM SERPL-SCNC: 142 MMOL/L (ref 134–144)
T4 SERPL-MCNC: 6.8 UG/DL (ref 4.5–12)
TSH SERPL DL<=0.005 MIU/L-ACNC: 0.77 UIU/ML (ref 0.45–4.5)
WBC # BLD AUTO: 5.3 X10E3/UL (ref 3.4–10.8)

## 2021-08-05 DIAGNOSIS — E78.00 HIGH CHOLESTEROL: ICD-10-CM

## 2021-08-05 RX ORDER — ATORVASTATIN CALCIUM 40 MG/1
TABLET, FILM COATED ORAL
Qty: 90 TABLET | Refills: 0 | Status: SHIPPED | OUTPATIENT
Start: 2021-08-05 | End: 2022-01-27

## 2021-08-06 ENCOUNTER — TELEPHONE (OUTPATIENT)
Dept: INTERNAL MEDICINE | Facility: CLINIC | Age: 66
End: 2021-08-06

## 2021-08-11 ENCOUNTER — OFFICE VISIT (OUTPATIENT)
Dept: INTERNAL MEDICINE | Facility: CLINIC | Age: 66
End: 2021-08-11

## 2021-08-11 VITALS
RESPIRATION RATE: 17 BRPM | SYSTOLIC BLOOD PRESSURE: 126 MMHG | DIASTOLIC BLOOD PRESSURE: 72 MMHG | TEMPERATURE: 98.2 F | HEIGHT: 62 IN | OXYGEN SATURATION: 98 % | HEART RATE: 83 BPM | WEIGHT: 123.8 LBS | BODY MASS INDEX: 22.78 KG/M2

## 2021-08-11 DIAGNOSIS — R03.0 ELEVATED BLOOD PRESSURE READING WITHOUT DIAGNOSIS OF HYPERTENSION: ICD-10-CM

## 2021-08-11 DIAGNOSIS — E78.2 MIXED HYPERLIPIDEMIA: ICD-10-CM

## 2021-08-11 DIAGNOSIS — M25.50 ARTHRALGIA, UNSPECIFIED JOINT: Primary | ICD-10-CM

## 2021-08-11 DIAGNOSIS — R07.89 CHEST WALL PAIN: ICD-10-CM

## 2021-08-11 DIAGNOSIS — R76.0 ABNORMAL ANTINUCLEAR ANTIBODY TITER: ICD-10-CM

## 2021-08-11 PROCEDURE — 99213 OFFICE O/P EST LOW 20 MIN: CPT | Performed by: FAMILY MEDICINE

## 2021-08-11 NOTE — PROGRESS NOTES
Subjective     Chief Complaint   Patient presents with   • Weight Loss     Follow up. Appetite good.       History of Present Illness  Using tramadol prn.    Chest and back pain improved.   She is feeling better.   Has gained a little weight  Lab results reviewed with patient.     Patient's PMR from outside medical facility reviewed and noted.    Review of Systems     Otherwise complete ROS reviewed and negative except as mentioned in the HPI.    Past Medical History:   Past Medical History:   Diagnosis Date   • Anxiety    • Depression    • Hyperlipidemia    • Liver enzyme elevation    • Sleep apnea      Past Surgical History:History reviewed. No pertinent surgical history.  Social History:  reports that she quit smoking about 2 years ago. Her smoking use included cigarettes. She has a 7.50 pack-year smoking history. She has never used smokeless tobacco. She reports current alcohol use of about 10.0 standard drinks of alcohol per week. She reports that she does not use drugs.    Family History: family history includes ADD / ADHD in her son; Arthritis in her mother and sister; Cancer in her mother and sister; Hyperlipidemia in her mother; Lupus in her brother, maternal aunt, and sister.       Allergies:  Allergies   Allergen Reactions   • Amoxicillin Diarrhea, Nausea Only and Other (See Comments)     Stomach cramping and fatigue   • Peanut-Containing Drug Products Shortness Of Breath   • Diclofenac Sodium Unknown - High Severity     Flushing and soa     Medications:  Prior to Admission medications    Medication Sig Start Date End Date Taking? Authorizing Provider   aspirin 81 MG tablet Take 81 mg by mouth Daily.   Yes Moshe Joshi MD   atorvastatin (LIPITOR) 40 MG tablet Take 1 tablet by mouth once daily 8/5/21  Yes Jessica Fierro, DO   b complex vitamins capsule Take 1 capsule by mouth Daily.   Yes Moshe Joshi MD   coenzyme Q10 50 MG capsule capsule Take 50 mg by mouth Daily.   Yes  "ProviderMoshe MD   fenofibrate (TRICOR) 145 MG tablet Take 1 tablet by mouth once daily 11/3/20  Yes Jessica Fierro DO   PARoxetine (PAXIL) 20 MG tablet TAKE 1 TABLET BY MOUTH ONCE DAILY IN THE MORNING 5/19/20  Yes Jessica Fierro DO   predniSONE (DELTASONE) 10 MG tablet 4 daily x 3 then 3 daily x 3 then 2 daily x 3 then 1 daily x 3 then stop 4/9/21  Yes Jessica Fierro DO   therapeutic multivitamin-minerals (THERAGRAN-M) tablet Take 1 tablet by mouth Daily.   Yes Moshe Joshi MD   traMADol (ULTRAM) 50 MG tablet Take 1 tablet by mouth Every 8 (Eight) Hours As Needed for Moderate Pain . 5/8/21  Yes Jessica Fierro DO   vitamin C (ASCORBIC ACID) 500 MG tablet Take 500 mg by mouth Daily.   Yes Moshe Joshi MD       Objective     Vital Signs: /72 (BP Location: Left arm, Patient Position: Sitting, Cuff Size: Adult)   Pulse 83   Temp 98.2 °F (36.8 °C) (Skin)   Resp 17   Ht 157.5 cm (62\")   Wt 56.2 kg (123 lb 12.8 oz)   SpO2 98%   BMI 22.64 kg/m²   Physical Exam  Vitals and nursing note reviewed.   Constitutional:       Appearance: Normal appearance. She is well-developed and normal weight.   HENT:      Head: Normocephalic and atraumatic.      Right Ear: Tympanic membrane, ear canal and external ear normal.      Left Ear: Tympanic membrane, ear canal and external ear normal.      Nose: Nose normal.      Mouth/Throat:      Mouth: Mucous membranes are moist.   Eyes:      General: No scleral icterus.        Right eye: No discharge.         Left eye: No discharge.      Conjunctiva/sclera: Conjunctivae normal.      Pupils: Pupils are equal, round, and reactive to light.   Neck:      Thyroid: No thyromegaly.      Vascular: No JVD.      Trachea: No tracheal deviation.   Cardiovascular:      Rate and Rhythm: Normal rate and regular rhythm.      Heart sounds: Normal heart sounds. No murmur heard.   No friction rub. No gallop.    Pulmonary:      Effort: Pulmonary effort is " normal.      Breath sounds: Normal breath sounds.   Abdominal:      General: Bowel sounds are normal. There is no distension.      Palpations: Abdomen is soft.      Tenderness: There is no abdominal tenderness.   Musculoskeletal:         General: Normal range of motion.      Cervical back: Normal range of motion and neck supple.   Lymphadenopathy:      Cervical: No cervical adenopathy.   Skin:     General: Skin is warm and dry.      Capillary Refill: Capillary refill takes less than 2 seconds.   Neurological:      Mental Status: She is alert and oriented to person, place, and time.      Cranial Nerves: No cranial nerve deficit.      Coordination: Coordination normal.   Psychiatric:         Behavior: Behavior normal.         Patient's Body mass index is 22.64 kg/m². indicating that she is within normal range (BMI 18.5-24.9). No BMI management plan needed..      Results Reviewed:  Glucose   Date Value Ref Range Status   10/21/2019 106 74 - 109 mg/dL Final     BUN   Date Value Ref Range Status   08/03/2021 8 8 - 27 mg/dL Final   10/21/2019 11 8 - 23 mg/dL Final     Creatinine   Date Value Ref Range Status   08/03/2021 0.60 0.57 - 1.00 mg/dL Final   10/21/2019 0.5 0.5 - 0.9 mg/dL Final     Sodium   Date Value Ref Range Status   08/03/2021 142 134 - 144 mmol/L Final   10/21/2019 141 136 - 145 mmol/L Final     Potassium   Date Value Ref Range Status   08/03/2021 4.0 3.5 - 5.2 mmol/L Final   10/21/2019 3.7 3.5 - 5.0 mmol/L Final     Chloride   Date Value Ref Range Status   08/03/2021 105 96 - 106 mmol/L Final   10/21/2019 102 98 - 111 mmol/L Final     CO2   Date Value Ref Range Status   10/21/2019 20 (L) 22 - 29 mmol/L Final     Total CO2   Date Value Ref Range Status   08/03/2021 21 20 - 29 mmol/L Final     Calcium   Date Value Ref Range Status   08/03/2021 9.8 8.7 - 10.3 mg/dL Final   10/21/2019 9.5 8.8 - 10.2 mg/dL Final     ALT (SGPT)   Date Value Ref Range Status   08/03/2021 17 0 - 32 IU/L Final   11/19/2019 35 (H) 5  - 33 U/L Final     AST (SGOT)   Date Value Ref Range Status   08/03/2021 16 0 - 40 IU/L Final   11/19/2019 28 5 - 32 U/L Final     WBC   Date Value Ref Range Status   08/03/2021 5.3 3.4 - 10.8 x10E3/uL Final   10/21/2019 6.2 4.8 - 10.8 K/uL Final     Hematocrit   Date Value Ref Range Status   08/03/2021 39.9 34.0 - 46.6 % Final   10/21/2019 45.7 37.0 - 47.0 % Final     Platelets   Date Value Ref Range Status   08/03/2021 300 150 - 450 x10E3/uL Final   10/21/2019 250 130 - 400 K/uL Final     Triglycerides   Date Value Ref Range Status   08/25/2020 185 (H) 0 - 149 mg/dL Final   10/21/2019 524 (H) 0 - 149 mg/dL Final     HDL Cholesterol   Date Value Ref Range Status   08/25/2020 65 >39 mg/dL Final   10/21/2019 75 65 - 121 mg/dL Final     Comment:     VALUES>60 MG/DL ARE ASSOCIATED WITH A DECREASED RISK OF  ATHEROSCLEROTIC CARDIOVASCULAR DISEASE     LDL Cholesterol    Date Value Ref Range Status   08/25/2020 109 (H) 0 - 99 mg/dL Final     Comment:     **Effective August 31, 2020, [facility] is implementing an improved**  equation to calculate Low Density Lipoprotein Cholesterol (LDL-C)  concentrations, to be used in all lipid panels that report calculated  LDL-C. This equation was developed through a collaboration with the  National Heart, Lung and Blood Institutes of Health (NIH).[1] The NIH  calculation overcomes the limitations of the existing Friedewald  LDL-C equation and performs equally well in both fasting and  non-fasting individuals.  1. Chris M, Helen C, Leda Q, et al. A new equation for  calculation of low-density lipoprotein cholesterol in patients with  normolipidemia and/or hypertriglyceridemia. [employer]iol.  2020 Feb 26. doi:10.1001/[email or web address]     10/21/2019 see below <100 mg/dL Final     Comment:     When the triglyceride is >400 mg/dL the calculated LDL and  VLDL are not valid.  <100 MG/DL=OPITIMAL    100-129 MG/DL=DESIRABLE    130-159 MG/DL BORDERLINE=INCREASED RISK OF  ATHEROSCLEROTIC  CARDIOVASCULAR DISEASE    > OR = 160 MG/DL=ASSOCIATED WITH AN INCREASE RISK OF  ATHEROSCLEROTIC CARDIOVASCULAR DISEASE         Assessment / Plan     Assessment/Plan:  1. Arthralgia, unspecified joint    - RIVAS by IFA, Reflex 9-biomarkers profile; Future  One month    2. Chest wall pain      3. Mixed hyperlipidemia      4. Elevated blood pressure reading without diagnosis of hypertension      5. Abnormal antinuclear antibody titer    Continue current medications  If worsen call immediately    Return in about 3 months (around 11/11/2021). unless patient needs to be seen sooner or acute issues arise.        I have discussed the patient results/orders and and plan/recommendation with them at today's visit.      Jessica Fierro,    08/11/2021

## 2021-08-17 DIAGNOSIS — R07.89 CHEST WALL PAIN: ICD-10-CM

## 2021-08-17 RX ORDER — TRAMADOL HYDROCHLORIDE 50 MG/1
TABLET ORAL
Qty: 30 TABLET | Refills: 0 | Status: SHIPPED | OUTPATIENT
Start: 2021-08-17 | End: 2021-09-07

## 2021-08-17 NOTE — TELEPHONE ENCOUNTER
Rx Refill Note  Requested Prescriptions     Pending Prescriptions Disp Refills   • traMADol (ULTRAM) 50 MG tablet [Pharmacy Med Name: traMADol HCl 50 MG Oral Tablet] 30 tablet 0     Sig: TAKE 1 TABLET BY MOUTH EVERY 8 HOURS AS NEEDED FOR MODERATE PAIN     Med last filled: 5/8/21   Last office visit with prescribing clinician: 8/11/2021      Next office visit with prescribing clinician: 8/24/2021     NEEDS UDS- want before refill?   Needs gal Carreon RN  08/17/21, 10:28 CDT

## 2021-08-24 DIAGNOSIS — E78.1 HIGH TRIGLYCERIDES: ICD-10-CM

## 2021-08-24 NOTE — TELEPHONE ENCOUNTER
Rx Refill Note  Requested Prescriptions     Pending Prescriptions Disp Refills   • fenofibrate (TRICOR) 145 MG tablet [Pharmacy Med Name: Fenofibrate 145 MG Oral Tablet] 90 tablet 0     Sig: Take 1 tablet by mouth once daily      Last office visit with prescribing clinician: 8/11/2021      Next office visit with prescribing clinician: 11/12/2021     Comprehensive metabolic panel (08/03/2021 11:00)  Lipid panel (08/25/2020 11:12)         Zakia Yee MA  08/24/21, 14:32 CDT

## 2021-08-25 RX ORDER — FENOFIBRATE 145 MG/1
TABLET, COATED ORAL
Qty: 90 TABLET | Refills: 0 | Status: SHIPPED | OUTPATIENT
Start: 2021-08-25 | End: 2022-01-27

## 2021-09-05 DIAGNOSIS — R07.89 CHEST WALL PAIN: ICD-10-CM

## 2021-09-07 RX ORDER — TRAMADOL HYDROCHLORIDE 50 MG/1
TABLET ORAL
Qty: 30 TABLET | Refills: 0 | Status: SHIPPED | OUTPATIENT
Start: 2021-09-07 | End: 2021-09-28

## 2021-09-07 NOTE — TELEPHONE ENCOUNTER
Rx Refill Note  Requested Prescriptions     Pending Prescriptions Disp Refills   • traMADol (ULTRAM) 50 MG tablet [Pharmacy Med Name: traMADol HCl 50 MG Oral Tablet] 30 tablet 0     Sig: TAKE 1 TABLET BY MOUTH EVERY 8 HOURS AS NEEDED FOR MODERATE PAIN      Last office visit with prescribing clinician: 8/11/2021      Next office visit with prescribing clinician: 11/12/2021     Med not due until Sept. 16th.        Zakia Yee MA  09/07/21, 07:51 CDT

## 2021-09-17 ENCOUNTER — CLINICAL SUPPORT (OUTPATIENT)
Dept: INTERNAL MEDICINE | Facility: CLINIC | Age: 66
End: 2021-09-17

## 2021-09-17 DIAGNOSIS — M25.50 ARTHRALGIA, UNSPECIFIED JOINT: ICD-10-CM

## 2021-09-17 PROCEDURE — 36415 COLL VENOUS BLD VENIPUNCTURE: CPT | Performed by: NURSE PRACTITIONER

## 2021-09-21 LAB
ANA SPECKLED TITR SER: ABNORMAL {TITER}
ANA TITR SER IF: POSITIVE {TITER}
CENTROMERE B AB SER-ACNC: <0.2 AI (ref 0–0.9)
CHROMATIN AB SERPL-ACNC: <0.2 AI (ref 0–0.9)
DSDNA AB SER-ACNC: 4 IU/ML (ref 0–9)
ENA JO1 AB SER-ACNC: <0.2 AI (ref 0–0.9)
ENA RNP AB SER-ACNC: 0.2 AI (ref 0–0.9)
ENA SCL70 AB SER-ACNC: <0.2 AI (ref 0–0.9)
ENA SM AB SER-ACNC: <0.2 AI (ref 0–0.9)
ENA SS-A AB SER-ACNC: 0.7 AI (ref 0–0.9)
ENA SS-B AB SER-ACNC: 1 AI (ref 0–0.9)
LABORATORY COMMENT REPORT: ABNORMAL
Lab: ABNORMAL
Lab: ABNORMAL

## 2021-09-27 DIAGNOSIS — R07.89 CHEST WALL PAIN: ICD-10-CM

## 2021-09-27 NOTE — TELEPHONE ENCOUNTER
Rx Refill Note  Requested Prescriptions     Pending Prescriptions Disp Refills   • traMADol (ULTRAM) 50 MG tablet [Pharmacy Med Name: traMADol HCl 50 MG Oral Tablet] 30 tablet 0     Sig: TAKE 1 TABLET BY MOUTH EVERY 8 HOURS AS NEEDED FOR MODERATE PAIN      Last office visit with prescribing clinician: 8/11/2021      Next office visit with prescribing clinician: 11/12/2021            Velma Edwards  09/27/21, 11:43 CDT

## 2021-09-28 ENCOUNTER — TELEPHONE (OUTPATIENT)
Dept: INTERNAL MEDICINE | Facility: CLINIC | Age: 66
End: 2021-09-28

## 2021-09-28 RX ORDER — TRAMADOL HYDROCHLORIDE 50 MG/1
TABLET ORAL
Qty: 30 TABLET | Refills: 0 | Status: SHIPPED | OUTPATIENT
Start: 2021-09-28 | End: 2021-10-14

## 2021-09-28 NOTE — TELEPHONE ENCOUNTER
----- Message from Jessica Fierro DO sent at 9/27/2021  8:13 PM CDT -----  Abnormal autoimmune testing   Needs to see rheumatology

## 2021-09-28 NOTE — TELEPHONE ENCOUNTER
Called and spoke to patient regarding her lab results and recommendations per Dr. Fierro. She voiced understanding and is happy to see a rheumatologist. She has no preference. She is also wanting a referral for a mammogram at [facility]. She had no further questions.

## 2021-09-28 NOTE — TELEPHONE ENCOUNTER
Caller: Karen Copeland    Relationship: Self    Best call back number: [phone]     What test was performed: RIVAS by IFA, Reflex 9-biomarkers profile, NICK+DNA/DS+Antich+Centro    When was the test performed: 09/17/2021    Where was the test performed: LAB    Additional notes: PATIENT REQUESTING CALLBACK TODAY TO GO OVER RESULTS. PLEASE ADVISE.

## 2021-09-29 DIAGNOSIS — Z12.31 ENCOUNTER FOR SCREENING MAMMOGRAM FOR MALIGNANT NEOPLASM OF BREAST: Primary | ICD-10-CM

## 2021-09-29 DIAGNOSIS — R76.8 ELEVATED ANTINUCLEAR ANTIBODY (ANA) LEVEL: ICD-10-CM

## 2021-10-14 DIAGNOSIS — R07.89 CHEST WALL PAIN: ICD-10-CM

## 2021-10-14 RX ORDER — TRAMADOL HYDROCHLORIDE 50 MG/1
TABLET ORAL
Qty: 30 TABLET | Refills: 0 | Status: SHIPPED | OUTPATIENT
Start: 2021-10-14 | End: 2021-11-02

## 2021-10-14 NOTE — TELEPHONE ENCOUNTER
Rx Refill Note  Requested Prescriptions     Pending Prescriptions Disp Refills   • traMADol (ULTRAM) 50 MG tablet [Pharmacy Med Name: traMADol HCl 50 MG Oral Tablet] 30 tablet 0     Sig: TAKE 1 TABLET BY MOUTH EVERY 8 HOURS AS NEEDED FOR MODERATE PAIN      Last office visit with prescribing clinician: 8/11/2021      Next office visit with prescribing clinician: 11/12/2021            Zakia Yee MA  10/14/21, 08:43 CDT

## 2021-11-01 DIAGNOSIS — R07.89 CHEST WALL PAIN: ICD-10-CM

## 2021-11-01 NOTE — TELEPHONE ENCOUNTER
Rx Refill Note  Requested Prescriptions     Pending Prescriptions Disp Refills   • traMADol (ULTRAM) 50 MG tablet [Pharmacy Med Name: traMADol HCl 50 MG Oral Tablet] 30 tablet 0     Sig: TAKE 1 TABLET BY MOUTH EVERY 8 HOURS AS NEEDED FOR MODERATE PAIN      Last office visit with prescribing clinician: 8/11/2021      Next office visit with prescribing clinician: 11/12/2021            Velma Edwards  11/01/21, 15:18 CDT

## 2021-11-02 RX ORDER — TRAMADOL HYDROCHLORIDE 50 MG/1
TABLET ORAL
Qty: 30 TABLET | Refills: 0 | Status: SHIPPED | OUTPATIENT
Start: 2021-11-02 | End: 2021-11-22

## 2021-11-16 ENCOUNTER — OFFICE VISIT (OUTPATIENT)
Dept: INTERNAL MEDICINE | Facility: CLINIC | Age: 66
End: 2021-11-16

## 2021-11-16 VITALS
TEMPERATURE: 98.5 F | HEIGHT: 62 IN | RESPIRATION RATE: 18 BRPM | WEIGHT: 120.4 LBS | DIASTOLIC BLOOD PRESSURE: 69 MMHG | HEART RATE: 90 BPM | BODY MASS INDEX: 22.16 KG/M2 | OXYGEN SATURATION: 99 % | SYSTOLIC BLOOD PRESSURE: 120 MMHG

## 2021-11-16 DIAGNOSIS — Z12.31 ENCOUNTER FOR SCREENING MAMMOGRAM FOR MALIGNANT NEOPLASM OF BREAST: ICD-10-CM

## 2021-11-16 DIAGNOSIS — E78.2 MIXED HYPERLIPIDEMIA: Primary | ICD-10-CM

## 2021-11-16 DIAGNOSIS — Z11.59 ENCOUNTER FOR HEPATITIS C SCREENING TEST FOR LOW RISK PATIENT: ICD-10-CM

## 2021-11-16 DIAGNOSIS — R76.8 ELEVATED ANTINUCLEAR ANTIBODY (ANA) LEVEL: ICD-10-CM

## 2021-11-16 DIAGNOSIS — M25.50 ARTHRALGIA, UNSPECIFIED JOINT: ICD-10-CM

## 2021-11-16 DIAGNOSIS — R73.9 ELEVATED BLOOD SUGAR: ICD-10-CM

## 2021-11-16 PROCEDURE — 3044F HG A1C LEVEL LT 7.0%: CPT | Performed by: FAMILY MEDICINE

## 2021-11-16 PROCEDURE — 83036 HEMOGLOBIN GLYCOSYLATED A1C: CPT | Performed by: FAMILY MEDICINE

## 2021-11-16 PROCEDURE — 99214 OFFICE O/P EST MOD 30 MIN: CPT | Performed by: FAMILY MEDICINE

## 2021-11-17 LAB
ALBUMIN SERPL-MCNC: 4.8 G/DL (ref 3.8–4.8)
ALBUMIN/GLOB SERPL: 2.1 {RATIO} (ref 1.2–2.2)
ALP SERPL-CCNC: 106 IU/L (ref 44–121)
ALT SERPL-CCNC: 24 IU/L (ref 0–32)
AST SERPL-CCNC: 19 IU/L (ref 0–40)
BILIRUB SERPL-MCNC: 0.4 MG/DL (ref 0–1.2)
BUN SERPL-MCNC: 10 MG/DL (ref 8–27)
BUN/CREAT SERPL: 17 (ref 12–28)
CALCIUM SERPL-MCNC: 9.7 MG/DL (ref 8.7–10.3)
CHLORIDE SERPL-SCNC: 107 MMOL/L (ref 96–106)
CHOLEST SERPL-MCNC: 205 MG/DL (ref 100–199)
CO2 SERPL-SCNC: 22 MMOL/L (ref 20–29)
CREAT SERPL-MCNC: 0.58 MG/DL (ref 0.57–1)
GLOBULIN SER CALC-MCNC: 2.3 G/DL (ref 1.5–4.5)
GLUCOSE SERPL-MCNC: 93 MG/DL (ref 65–99)
HCV AB S/CO SERPL IA: <0.1 S/CO RATIO (ref 0–0.9)
HDLC SERPL-MCNC: 58 MG/DL
LDLC SERPL CALC-MCNC: 95 MG/DL (ref 0–99)
POTASSIUM SERPL-SCNC: 4.2 MMOL/L (ref 3.5–5.2)
PROT SERPL-MCNC: 7.1 G/DL (ref 6–8.5)
SODIUM SERPL-SCNC: 145 MMOL/L (ref 134–144)
TRIGL SERPL-MCNC: 312 MG/DL (ref 0–149)
VLDLC SERPL CALC-MCNC: 52 MG/DL (ref 5–40)

## 2021-11-18 ENCOUNTER — TELEPHONE (OUTPATIENT)
Dept: INTERNAL MEDICINE | Facility: CLINIC | Age: 66
End: 2021-11-18

## 2021-11-18 NOTE — TELEPHONE ENCOUNTER
----- Message from Jessica Fierro DO sent at 11/18/2021  8:35 AM CST -----  Lipid profile shows total cholesterol being elevated by 5 points.  Triglycerides are 312 which is over 150 points high.  Would recommend following a low-cholesterol low triglyceride diet.  If this is not controlled the cholesterol and triglycerides then would recommend medication.  Hep C virus is negative chemistry is normal.

## 2021-11-18 NOTE — TELEPHONE ENCOUNTER
Attempted to call patient. No answer. Left voicemail and advised a return call with any questions.

## 2021-11-20 DIAGNOSIS — R07.89 CHEST WALL PAIN: ICD-10-CM

## 2021-11-22 RX ORDER — TRAMADOL HYDROCHLORIDE 50 MG/1
TABLET ORAL
Qty: 30 TABLET | Refills: 0 | Status: SHIPPED | OUTPATIENT
Start: 2021-11-22 | End: 2021-12-07

## 2021-11-22 NOTE — TELEPHONE ENCOUNTER
Rx Refill Note  Requested Prescriptions     Pending Prescriptions Disp Refills   • traMADol (ULTRAM) 50 MG tablet [Pharmacy Med Name: traMADol HCl 50 MG Oral Tablet] 30 tablet 0     Sig: TAKE 1 TABLET BY MOUTH EVERY 8 HOURS AS NEEDED FOR MODERATE PAIN      Last office visit with prescribing clinician: 11/16/2021      Next office visit with prescribing clinician: 11/30/2021            Zakia Yee MA  11/22/21, 09:22 CST

## 2021-12-03 ENCOUNTER — APPOINTMENT (OUTPATIENT)
Dept: MAMMOGRAPHY | Facility: HOSPITAL | Age: 66
End: 2021-12-03

## 2021-12-03 LAB
EXPIRATION DATE: NORMAL
HBA1C MFR BLD: 5.1 %
Lab: NORMAL

## 2021-12-06 DIAGNOSIS — R07.89 CHEST WALL PAIN: ICD-10-CM

## 2021-12-06 NOTE — TELEPHONE ENCOUNTER
Rx Refill Note  Requested Prescriptions     Pending Prescriptions Disp Refills   • traMADol (ULTRAM) 50 MG tablet [Pharmacy Med Name: traMADol HCl 50 MG Oral Tablet] 30 tablet 0     Sig: TAKE 1 TABLET BY MOUTH EVERY 8 HOURS AS NEEDED FOR MODERATE PAIN      Last office visit with prescribing clinician: 11/16/2021      Next office visit with prescribing clinician: 2/15/2022            Zakia Yee MA  12/06/21, 11:40 CST

## 2021-12-07 RX ORDER — TRAMADOL HYDROCHLORIDE 50 MG/1
TABLET ORAL
Qty: 30 TABLET | Refills: 0 | Status: SHIPPED | OUTPATIENT
Start: 2021-12-07 | End: 2021-12-28

## 2021-12-26 DIAGNOSIS — R07.89 CHEST WALL PAIN: ICD-10-CM

## 2021-12-27 NOTE — TELEPHONE ENCOUNTER
Rx Refill Note  Requested Prescriptions     Pending Prescriptions Disp Refills   • traMADol (ULTRAM) 50 MG tablet [Pharmacy Med Name: traMADol HCl 50 MG Oral Tablet] 30 tablet 0     Sig: TAKE 1 TABLET BY MOUTH EVERY 8 HOURS AS NEEDED FOR MODERATE PAIN      Last office visit with prescribing clinician: 11/16/2021      Next office visit with prescribing clinician: 2/15/2022            Velma Edwards  12/27/21, 09:27 CST

## 2021-12-28 ENCOUNTER — APPOINTMENT (OUTPATIENT)
Dept: MAMMOGRAPHY | Facility: HOSPITAL | Age: 66
End: 2021-12-28

## 2021-12-28 RX ORDER — TRAMADOL HYDROCHLORIDE 50 MG/1
TABLET ORAL
Qty: 30 TABLET | Refills: 0 | Status: SHIPPED | OUTPATIENT
Start: 2021-12-28 | End: 2022-01-13

## 2022-01-13 DIAGNOSIS — R07.89 CHEST WALL PAIN: ICD-10-CM

## 2022-01-13 RX ORDER — TRAMADOL HYDROCHLORIDE 50 MG/1
TABLET ORAL
Qty: 30 TABLET | Refills: 0 | Status: SHIPPED | OUTPATIENT
Start: 2022-01-13 | End: 2022-01-19

## 2022-01-13 NOTE — TELEPHONE ENCOUNTER
Rx Refill Note  Requested Prescriptions     Pending Prescriptions Disp Refills   • traMADol (ULTRAM) 50 MG tablet [Pharmacy Med Name: traMADol HCl 50 MG Oral Tablet] 30 tablet 0     Sig: TAKE 1 TABLET BY MOUTH EVERY 8 HOURS AS NEEDED FOR MODERATE PAIN      Last office visit with prescribing clinician: 11/16/2021      Next office visit with prescribing clinician: 2/15/2022            Zakia Yee MA  01/13/22, 08:44 CST

## 2022-01-17 DIAGNOSIS — R07.89 CHEST WALL PAIN: ICD-10-CM

## 2022-01-17 NOTE — TELEPHONE ENCOUNTER
Pharmacy did not receive script. Transmission failed.    Rx Refill Note  Requested Prescriptions     Pending Prescriptions Disp Refills   • traMADol (ULTRAM) 50 MG tablet [Pharmacy Med Name: traMADol HCl 50 MG Oral Tablet] 30 tablet 0     Sig: TAKE 1 TABLET BY MOUTH EVERY 8 HOURS AS NEEDED FOR MODERATE PAIN      Last office visit with prescribing clinician: 11/16/2021      Next office visit with prescribing clinician: 2/15/2022            Thalia Carreon RN  01/17/22, 10:02 CST

## 2022-01-17 NOTE — TELEPHONE ENCOUNTER
Pt called and stated that the pharmacy did not receive request. It looks like the transmission failed. Please advise.

## 2022-01-19 RX ORDER — TRAMADOL HYDROCHLORIDE 50 MG/1
TABLET ORAL
Qty: 30 TABLET | Refills: 0 | Status: SHIPPED | OUTPATIENT
Start: 2022-01-19 | End: 2022-02-05 | Stop reason: SDUPTHER

## 2022-01-26 DIAGNOSIS — E78.00 HIGH CHOLESTEROL: ICD-10-CM

## 2022-01-26 DIAGNOSIS — E78.1 HIGH TRIGLYCERIDES: ICD-10-CM

## 2022-01-26 NOTE — TELEPHONE ENCOUNTER
Rx Refill Note  Requested Prescriptions     Pending Prescriptions Disp Refills   • fenofibrate (TRICOR) 145 MG tablet [Pharmacy Med Name: Fenofibrate 145 MG Oral Tablet] 90 tablet 0     Sig: Take 1 tablet by mouth once daily   • atorvastatin (LIPITOR) 40 MG tablet [Pharmacy Med Name: Atorvastatin Calcium 40 MG Oral Tablet] 90 tablet 0     Sig: Take 1 tablet by mouth once daily      Last office visit with prescribing clinician: 11/16/2021      Next office visit with prescribing clinician: 2/15/2022     Tried to call pt to double check to see if she was staying here but no answer. She has apt with you on 2/8/22               Thalia Carreon, RN  01/26/22, 15:52 CST

## 2022-01-27 RX ORDER — ATORVASTATIN CALCIUM 40 MG/1
TABLET, FILM COATED ORAL
Qty: 90 TABLET | Refills: 0 | Status: SHIPPED | OUTPATIENT
Start: 2022-01-27 | End: 2022-05-09

## 2022-01-27 RX ORDER — FENOFIBRATE 145 MG/1
TABLET, COATED ORAL
Qty: 90 TABLET | Refills: 0 | Status: SHIPPED | OUTPATIENT
Start: 2022-01-27 | End: 2022-02-11 | Stop reason: SDUPTHER

## 2022-02-05 DIAGNOSIS — R07.89 CHEST WALL PAIN: ICD-10-CM

## 2022-02-08 ENCOUNTER — OFFICE VISIT (OUTPATIENT)
Dept: INTERNAL MEDICINE | Facility: CLINIC | Age: 67
End: 2022-02-08

## 2022-02-08 VITALS
TEMPERATURE: 98.3 F | RESPIRATION RATE: 16 BRPM | SYSTOLIC BLOOD PRESSURE: 137 MMHG | HEART RATE: 82 BPM | OXYGEN SATURATION: 100 % | HEIGHT: 62 IN | WEIGHT: 119 LBS | DIASTOLIC BLOOD PRESSURE: 81 MMHG | BODY MASS INDEX: 21.9 KG/M2

## 2022-02-08 DIAGNOSIS — E78.2 MIXED HYPERLIPIDEMIA: ICD-10-CM

## 2022-02-08 DIAGNOSIS — L98.9 SKIN LESION OF SCALP: ICD-10-CM

## 2022-02-08 DIAGNOSIS — M79.10 MYALGIA: Primary | ICD-10-CM

## 2022-02-08 PROCEDURE — 99214 OFFICE O/P EST MOD 30 MIN: CPT | Performed by: NURSE PRACTITIONER

## 2022-02-08 RX ORDER — TRAMADOL HYDROCHLORIDE 50 MG/1
50 TABLET ORAL EVERY 8 HOURS PRN
Qty: 30 TABLET | Refills: 0 | Status: SHIPPED | OUTPATIENT
Start: 2022-02-08 | End: 2022-02-24

## 2022-02-08 NOTE — PROGRESS NOTES
Subjective     Chief Complaint   Patient presents with   • Hyperlipidemia     disscuss medications    • Suspicious Skin Lesion       History of Present Illness  Patient comes in today to establish care with me from of Dr. Fierro.  She does carry a history of arthritis pain as well as hyperlipidemia, hypertriglyceridemia, and anxiety.  Patient states she has been on a statin for years without any problems.  Unfortunately, she was placed on TriCor and then took her dose down and she did okay with the dose increase she feels tired more achy states she thinks that it is the TriCor at the elevated dose.  She is fasting today.  In addition, when getting her hair done last week her hairdresser did notice a skin change right at the scalp margin to the right side temple.  States that this has become more raised and does seem to be growing in size.  Is not changed colors.  Patient states that she is known about this for some time but it does seem to be changing.  In addition, she has some lesions on the left side of the scalp that are also growing.  Patient's PMR from outside medical facility reviewed and noted.    Review of Systems   Otherwise complete ROS reviewed and negative except as mentioned in the HPI.    Past Medical History:   Past Medical History:   Diagnosis Date   • Anxiety    • Depression    • Hyperlipidemia    • Liver enzyme elevation    • Sleep apnea      Past Surgical History:History reviewed. No pertinent surgical history.  Social History:  reports that she quit smoking about 3 years ago. Her smoking use included cigarettes. She has a 7.50 pack-year smoking history. She has never used smokeless tobacco. She reports current alcohol use of about 10.0 standard drinks of alcohol per week. She reports that she does not use drugs.    Family History: family history includes ADD / ADHD in her son; Arthritis in her mother and sister; Cancer in her mother and sister; Hyperlipidemia in her mother; Lupus in her  "brother, maternal aunt, and sister.      Allergies:  Allergies   Allergen Reactions   • Amoxicillin Diarrhea, Nausea Only and Other (See Comments)     Stomach cramping and fatigue   • Peanut-Containing Drug Products Shortness Of Breath   • Diclofenac Sodium Unknown - High Severity     Flushing and soa     Medications:  Prior to Admission medications    Medication Sig Start Date End Date Taking? Authorizing Provider   aspirin 81 MG tablet Take 81 mg by mouth Daily.   Yes ProviderMoshe MD   atorvastatin (LIPITOR) 40 MG tablet Take 1 tablet by mouth once daily 1/27/22  Yes Afua Dey APRN   b complex vitamins capsule Take 1 capsule by mouth Daily.   Yes ProviderMoshe MD   coenzyme Q10 50 MG capsule capsule Take 50 mg by mouth Daily.   Yes ProviderMoshe MD   fenofibrate (TRICOR) 145 MG tablet Take 1 tablet by mouth once daily 1/27/22  Yes Afua Dey APRN   therapeutic multivitamin-minerals (THERAGRAN-M) tablet Take 1 tablet by mouth Daily.   Yes ProviderMoshe MD   traMADol (ULTRAM) 50 MG tablet Take 1 tablet by mouth Every 8 (Eight) Hours As Needed for Moderate Pain . 2/8/22  Yes Jessica Fierro DO   vitamin C (ASCORBIC ACID) 500 MG tablet Take 500 mg by mouth Daily.   Yes ProviderMoshe MD   PARoxetine (PAXIL) 20 MG tablet TAKE 1 TABLET BY MOUTH ONCE DAILY IN THE MORNING 5/19/20   Jessica Fierro DO       Objective     Vital Signs: /81 (BP Location: Right arm, Patient Position: Sitting, Cuff Size: Adult)   Pulse 82   Temp 98.3 °F (36.8 °C) (Infrared)   Resp 16   Ht 157.5 cm (62.01\")   Wt 54 kg (119 lb)   SpO2 100%   BMI 21.76 kg/m²   Physical Exam  Vitals reviewed.   Constitutional:       Appearance: She is well-developed.   HENT:      Head: Normocephalic and atraumatic.   Eyes:      Pupils: Pupils are equal, round, and reactive to light.   Neck:      Vascular: No JVD.   Cardiovascular:      Rate and Rhythm: Normal rate and " regular rhythm.   Pulmonary:      Effort: Pulmonary effort is normal.      Comments: Coarse bilaterally.   Abdominal:      General: Bowel sounds are normal.      Palpations: Abdomen is soft.   Musculoskeletal:         General: No deformity.      Cervical back: Normal range of motion and neck supple.   Lymphadenopathy:      Cervical: No cervical adenopathy.   Skin:     General: Skin is warm and dry.      Findings: Lesion (see picture. 1 cm X1 cm. rough raised  area. ) present.   Neurological:      Mental Status: She is alert and oriented to person, place, and time.   Psychiatric:         Behavior: Behavior normal.         Thought Content: Thought content normal.         Judgment: Judgment normal.         Patient's Body mass index is 21.76 kg/m². indicating that she is within normal range (BMI 18.5-24.9). No BMI management plan needed..      Results Reviewed:  Glucose   Date Value Ref Range Status   02/08/2022 108 (H) 65 - 99 mg/dL Final   10/21/2019 106 74 - 109 mg/dL Final     BUN   Date Value Ref Range Status   02/08/2022 10 8 - 27 mg/dL Final   10/21/2019 11 8 - 23 mg/dL Final     Creatinine   Date Value Ref Range Status   02/08/2022 0.60 0.57 - 1.00 mg/dL Final   10/21/2019 0.5 0.5 - 0.9 mg/dL Final     Sodium   Date Value Ref Range Status   02/08/2022 141 134 - 144 mmol/L Final   10/21/2019 141 136 - 145 mmol/L Final     Potassium   Date Value Ref Range Status   02/08/2022 3.9 3.5 - 5.2 mmol/L Final   10/21/2019 3.7 3.5 - 5.0 mmol/L Final     Chloride   Date Value Ref Range Status   02/08/2022 102 96 - 106 mmol/L Final   10/21/2019 102 98 - 111 mmol/L Final     CO2   Date Value Ref Range Status   10/21/2019 20 (L) 22 - 29 mmol/L Final     Total CO2   Date Value Ref Range Status   02/08/2022 22 20 - 29 mmol/L Final     Calcium   Date Value Ref Range Status   02/08/2022 9.7 8.7 - 10.3 mg/dL Final   10/21/2019 9.5 8.8 - 10.2 mg/dL Final     ALT (SGPT)   Date Value Ref Range Status   11/16/2021 24 0 - 32 IU/L Final    11/19/2019 35 (H) 5 - 33 U/L Final     AST (SGOT)   Date Value Ref Range Status   11/16/2021 19 0 - 40 IU/L Final   11/19/2019 28 5 - 32 U/L Final     WBC   Date Value Ref Range Status   08/03/2021 5.3 3.4 - 10.8 x10E3/uL Final   10/21/2019 6.2 4.8 - 10.8 K/uL Final     Hematocrit   Date Value Ref Range Status   08/03/2021 39.9 34.0 - 46.6 % Final   10/21/2019 45.7 37.0 - 47.0 % Final     Platelets   Date Value Ref Range Status   08/03/2021 300 150 - 450 x10E3/uL Final   10/21/2019 250 130 - 400 K/uL Final     Triglycerides   Date Value Ref Range Status   02/08/2022 443 (H) 0 - 149 mg/dL Final   10/21/2019 524 (H) 0 - 149 mg/dL Final     HDL Cholesterol   Date Value Ref Range Status   02/08/2022 54 >39 mg/dL Final   10/21/2019 75 65 - 121 mg/dL Final     Comment:     VALUES>60 MG/DL ARE ASSOCIATED WITH A DECREASED RISK OF  ATHEROSCLEROTIC CARDIOVASCULAR DISEASE     LDL Cholesterol    Date Value Ref Range Status   10/21/2019 see below <100 mg/dL Final     Comment:     When the triglyceride is >400 mg/dL the calculated LDL and  VLDL are not valid.  <100 MG/DL=OPITIMAL    100-129 MG/DL=DESIRABLE    130-159 MG/DL BORDERLINE=INCREASED RISK OF ATHEROSCLEROTIC  CARDIOVASCULAR DISEASE    > OR = 160 MG/DL=ASSOCIATED WITH AN INCREASE RISK OF  ATHEROSCLEROTIC CARDIOVASCULAR DISEASE     LDL Chol Calc (NIH)   Date Value Ref Range Status   02/08/2022 126 (H) 0 - 99 mg/dL Final     Hemoglobin A1C   Date Value Ref Range Status   12/03/2021 5.1 % Final         Assessment / Plan     Assessment/Plan:  Diagnoses and all orders for this visit:    1. Myalgia (Primary)  -     CK  -     Sedimentation rate, automated  -     C-reactive Protein  -     Basic Metabolic Panel    2. Mixed hyperlipidemia  -     Lipid Panel    3. Skin lesion of scalp  -     Ambulatory Referral to Dermatology      Return in about 3 months (around 5/8/2022). unless patient needs to be seen sooner or acute issues arise.    Code Status: Full.     I have discussed the  patient results/orders and and plan/recommendation with them at today's visit.      Afua Dey, COLLEEN   02/08/2022

## 2022-02-09 LAB
BUN SERPL-MCNC: 10 MG/DL (ref 8–27)
BUN/CREAT SERPL: 17 (ref 12–28)
CALCIUM SERPL-MCNC: 9.7 MG/DL (ref 8.7–10.3)
CHLORIDE SERPL-SCNC: 102 MMOL/L (ref 96–106)
CHOLEST SERPL-MCNC: 259 MG/DL (ref 100–199)
CK SERPL-CCNC: 56 U/L (ref 32–182)
CO2 SERPL-SCNC: 22 MMOL/L (ref 20–29)
CREAT SERPL-MCNC: 0.6 MG/DL (ref 0.57–1)
CRP SERPL-MCNC: <1 MG/L (ref 0–10)
ERYTHROCYTE [SEDIMENTATION RATE] IN BLOOD BY WESTERGREN METHOD: 2 MM/HR (ref 0–40)
GLUCOSE SERPL-MCNC: 108 MG/DL (ref 65–99)
HDLC SERPL-MCNC: 54 MG/DL
LDLC SERPL CALC-MCNC: 126 MG/DL (ref 0–99)
POTASSIUM SERPL-SCNC: 3.9 MMOL/L (ref 3.5–5.2)
SODIUM SERPL-SCNC: 141 MMOL/L (ref 134–144)
TRIGL SERPL-MCNC: 443 MG/DL (ref 0–149)
VLDLC SERPL CALC-MCNC: 79 MG/DL (ref 5–40)

## 2022-02-11 RX ORDER — GEMFIBROZIL 600 MG/1
600 TABLET, FILM COATED ORAL
Qty: 60 TABLET | Refills: 1 | Status: SHIPPED | OUTPATIENT
Start: 2022-02-11 | End: 2022-05-09

## 2022-02-15 ENCOUNTER — HOSPITAL ENCOUNTER (OUTPATIENT)
Dept: MAMMOGRAPHY | Facility: HOSPITAL | Age: 67
Discharge: HOME | End: 2022-02-15
Admitting: FAMILY MEDICINE

## 2022-02-15 DIAGNOSIS — Z12.31 ENCOUNTER FOR SCREENING MAMMOGRAM FOR MALIGNANT NEOPLASM OF BREAST: ICD-10-CM

## 2022-02-15 PROCEDURE — 77067 SCR MAMMO BI INCL CAD: CPT

## 2022-02-15 PROCEDURE — 77063 BREAST TOMOSYNTHESIS BI: CPT

## 2022-02-24 DIAGNOSIS — R07.89 CHEST WALL PAIN: ICD-10-CM

## 2022-02-24 RX ORDER — TRAMADOL HYDROCHLORIDE 50 MG/1
TABLET ORAL
Qty: 30 TABLET | Refills: 0 | Status: SHIPPED | OUTPATIENT
Start: 2022-02-24 | End: 2022-03-14

## 2022-02-24 NOTE — TELEPHONE ENCOUNTER
This was filled 2 weeks ago    Rx Refill Note  Requested Prescriptions     Pending Prescriptions Disp Refills   • traMADol (ULTRAM) 50 MG tablet [Pharmacy Med Name: traMADol HCl 50 MG Oral Tablet] 30 tablet 0     Sig: TAKE 1 TABLET BY MOUTH EVERY 8 HOURS AS NEEDED FOR MODERATE PAIN   Med last filled: 2/8/22   Last office visit with prescribing clinician: 11/16/2021      Next office visit with prescribing clinician: Visit date not found    NO UDS ON FILE               Thalia Carreon RN  02/24/22, 09:55 CST

## 2022-03-14 DIAGNOSIS — R07.89 CHEST WALL PAIN: ICD-10-CM

## 2022-03-14 RX ORDER — TRAMADOL HYDROCHLORIDE 50 MG/1
TABLET ORAL
Qty: 30 TABLET | Refills: 0 | Status: SHIPPED | OUTPATIENT
Start: 2022-03-14 | End: 2022-04-04 | Stop reason: SDUPTHER

## 2022-03-14 NOTE — TELEPHONE ENCOUNTER
Rx Refill Note  Requested Prescriptions     Pending Prescriptions Disp Refills   • traMADol (ULTRAM) 50 MG tablet [Pharmacy Med Name: traMADol HCl 50 MG Oral Tablet] 30 tablet 0     Sig: TAKE 1 TABLET BY MOUTH EVERY 8 HOURS AS NEEDED FOR MODERATE PAIN      Last office visit with prescribing clinician: 2/8/2022      Next office visit with prescribing clinician: Visit date not found            Abby Mcrae CMA  03/14/22, 14:12 CDT

## 2022-03-31 DIAGNOSIS — R07.89 CHEST WALL PAIN: ICD-10-CM

## 2022-04-04 DIAGNOSIS — R07.89 CHEST WALL PAIN: ICD-10-CM

## 2022-04-04 RX ORDER — TRAMADOL HYDROCHLORIDE 50 MG/1
TABLET ORAL
Qty: 30 TABLET | Refills: 0 | OUTPATIENT
Start: 2022-04-04

## 2022-04-04 RX ORDER — TRAMADOL HYDROCHLORIDE 50 MG/1
50 TABLET ORAL EVERY 8 HOURS PRN
Qty: 30 TABLET | Refills: 1 | Status: SHIPPED | OUTPATIENT
Start: 2022-04-04 | End: 2022-05-16

## 2022-04-04 NOTE — TELEPHONE ENCOUNTER
Can you call pt to get her an appointment with [facility] for next month with UDS at appointment please? Thank you! Apt will need to be a few days earlier than a month from today.

## 2022-04-04 NOTE — TELEPHONE ENCOUNTER
Rx Refill Note  Requested Prescriptions     Pending Prescriptions Disp Refills   • traMADol (ULTRAM) 50 MG tablet 30 tablet 0     Sig: Take 1 tablet by mouth Every 8 (Eight) Hours As Needed for Moderate Pain .   Med last filled:  3/14/22  Last office visit with prescribing clinician: 2/8/2022      Next office visit with prescribing clinician: Visit date not found     NO UDS ON FILE              Thalia Carreon RN  04/04/22, 12:31 CDT

## 2022-04-15 ENCOUNTER — OFFICE VISIT (OUTPATIENT)
Dept: INTERNAL MEDICINE | Facility: CLINIC | Age: 67
End: 2022-04-15

## 2022-04-15 VITALS
SYSTOLIC BLOOD PRESSURE: 135 MMHG | BODY MASS INDEX: 21.76 KG/M2 | RESPIRATION RATE: 17 BRPM | HEIGHT: 62 IN | HEART RATE: 89 BPM | WEIGHT: 118.25 LBS | DIASTOLIC BLOOD PRESSURE: 78 MMHG | TEMPERATURE: 98.6 F | OXYGEN SATURATION: 99 %

## 2022-04-15 DIAGNOSIS — Z79.899 LONG TERM USE OF DRUG: ICD-10-CM

## 2022-04-15 DIAGNOSIS — M25.50 ARTHRALGIA, UNSPECIFIED JOINT: Primary | ICD-10-CM

## 2022-04-15 DIAGNOSIS — E78.2 MIXED HYPERLIPIDEMIA: ICD-10-CM

## 2022-04-15 PROCEDURE — 99213 OFFICE O/P EST LOW 20 MIN: CPT | Performed by: NURSE PRACTITIONER

## 2022-04-15 NOTE — PROGRESS NOTES
Subjective     Chief Complaint   Patient presents with   • Muscle Pain   • Hyperlipidemia       History of Present Illness  Patient presents today for a follow up on muscle pain. Patient started taking Tramadol. Reports the pain has improved, but does take medication daily. Reports pain in her lower back and knees are normally where the pain persists. No falls reported.      Patient has a history of hyperlipidemia. Reports the gemfibrozil has been make her nauseous. Will become dizzy at times. Only been taking it once a day. Continues to take atorvastatin daily with no side effects.     Review of Systems   Constitutional: Negative for fatigue and unexpected weight change.   Respiratory: Negative.  Negative for chest tightness and shortness of breath.    Cardiovascular: Negative.  Negative for chest pain.   Gastrointestinal: Positive for nausea.   Genitourinary: Negative.    Musculoskeletal: Positive for arthralgias and back pain.   Skin: Negative.    Neurological: Positive for dizziness. Negative for seizures and headaches.   Psychiatric/Behavioral: Negative.           Past Medical History:   Past Medical History:   Diagnosis Date   • Anxiety    • Depression    • Hyperlipidemia    • Liver enzyme elevation    • Sleep apnea      Past Surgical History:History reviewed. No pertinent surgical history.  Social History:  reports that she quit smoking about 3 years ago. Her smoking use included cigarettes. She has a 7.50 pack-year smoking history. She has never used smokeless tobacco. She reports current alcohol use of about 10.0 standard drinks of alcohol per week. She reports that she does not use drugs.    Family History: family history includes ADD / ADHD in her son; Arthritis in her mother and sister; Breast cancer in her sister; Cancer in her mother and sister; Hyperlipidemia in her mother; Lupus in her brother, maternal aunt, and sister.       Allergies:  Allergies   Allergen Reactions   • Amoxicillin  "Diarrhea, Nausea Only and Other (See Comments)     Stomach cramping and fatigue   • Peanut-Containing Drug Products Shortness Of Breath   • Diclofenac Sodium Unknown - High Severity     Flushing and soa     Medications:  Prior to Admission medications    Medication Sig Start Date End Date Taking? Authorizing Provider   aspirin 81 MG tablet Take 81 mg by mouth Daily.   Yes Moshe Joshi MD   atorvastatin (LIPITOR) 40 MG tablet Take 1 tablet by mouth once daily 1/27/22  Yes Afua Dey APRN   b complex vitamins capsule Take 1 capsule by mouth Daily.   Yes Moshe Joshi MD   coenzyme Q10 50 MG capsule capsule Take 50 mg by mouth Daily.   Yes Moshe Joshi MD   gemfibrozil (Lopid) 600 MG tablet Take 1 tablet by mouth 2 (Two) Times a Day Before Meals. 2/11/22  Yes Afua Dey APRN   PARoxetine (PAXIL) 20 MG tablet TAKE 1 TABLET BY MOUTH ONCE DAILY IN THE MORNING 5/19/20  Yes Jessica Fierro DO   therapeutic multivitamin-minerals (THERAGRAN-M) tablet Take 1 tablet by mouth Daily.   Yes Moshe Joshi MD   traMADol (ULTRAM) 50 MG tablet Take 1 tablet by mouth Every 8 (Eight) Hours As Needed for Moderate Pain . 4/4/22  Yes Afua Dey APRN   vitamin C (ASCORBIC ACID) 500 MG tablet Take 500 mg by mouth Daily.   Yes Moshe Joshi MD       Objective     Vital Signs: /78 (BP Location: Left arm, Patient Position: Sitting, Cuff Size: Small Adult)   Pulse 89   Temp 98.6 °F (37 °C)   Resp 17   Ht 157.5 cm (62.01\")   Wt 53.6 kg (118 lb 4 oz)   SpO2 99%   BMI 21.62 kg/m²   Physical Exam  Vitals reviewed.   Constitutional:       Appearance: She is well-developed.   HENT:      Head: Normocephalic and atraumatic.   Eyes:      Pupils: Pupils are equal, round, and reactive to light.   Neck:      Vascular: No JVD.   Cardiovascular:      Rate and Rhythm: Normal rate and regular rhythm.      Pulses: Normal pulses.      Heart sounds: Normal heart " sounds.   Pulmonary:      Effort: Pulmonary effort is normal.      Breath sounds: Normal breath sounds.   Abdominal:      General: Bowel sounds are normal.      Palpations: Abdomen is soft.   Musculoskeletal:         General: No deformity.      Cervical back: Normal range of motion and neck supple.   Lymphadenopathy:      Cervical: No cervical adenopathy.   Skin:     General: Skin is warm and dry.   Neurological:      Mental Status: She is alert and oriented to person, place, and time.      Motor: No weakness.      Gait: Gait normal.   Psychiatric:         Behavior: Behavior normal.         Thought Content: Thought content normal.         Judgment: Judgment normal.         Patient's Body mass index is 21.62 kg/m². indicating that she is within normal range (BMI 18.5-24.9). No BMI management plan needed..      Results Reviewed:  Glucose   Date Value Ref Range Status   02/08/2022 108 (H) 65 - 99 mg/dL Final   10/21/2019 106 74 - 109 mg/dL Final     BUN   Date Value Ref Range Status   02/08/2022 10 8 - 27 mg/dL Final   10/21/2019 11 8 - 23 mg/dL Final     Creatinine   Date Value Ref Range Status   02/08/2022 0.60 0.57 - 1.00 mg/dL Final   10/21/2019 0.5 0.5 - 0.9 mg/dL Final     Sodium   Date Value Ref Range Status   02/08/2022 141 134 - 144 mmol/L Final   10/21/2019 141 136 - 145 mmol/L Final     Potassium   Date Value Ref Range Status   02/08/2022 3.9 3.5 - 5.2 mmol/L Final   10/21/2019 3.7 3.5 - 5.0 mmol/L Final     Chloride   Date Value Ref Range Status   02/08/2022 102 96 - 106 mmol/L Final   10/21/2019 102 98 - 111 mmol/L Final     CO2   Date Value Ref Range Status   10/21/2019 20 (L) 22 - 29 mmol/L Final     Total CO2   Date Value Ref Range Status   02/08/2022 22 20 - 29 mmol/L Final     Calcium   Date Value Ref Range Status   02/08/2022 9.7 8.7 - 10.3 mg/dL Final   10/21/2019 9.5 8.8 - 10.2 mg/dL Final     ALT (SGPT)   Date Value Ref Range Status   11/16/2021 24 0 - 32 IU/L Final   11/19/2019 35 (H) 5 - 33 U/L  Final     AST (SGOT)   Date Value Ref Range Status   11/16/2021 19 0 - 40 IU/L Final   11/19/2019 28 5 - 32 U/L Final     WBC   Date Value Ref Range Status   08/03/2021 5.3 3.4 - 10.8 x10E3/uL Final   10/21/2019 6.2 4.8 - 10.8 K/uL Final     Hematocrit   Date Value Ref Range Status   08/03/2021 39.9 34.0 - 46.6 % Final   10/21/2019 45.7 37.0 - 47.0 % Final     Platelets   Date Value Ref Range Status   08/03/2021 300 150 - 450 x10E3/uL Final   10/21/2019 250 130 - 400 K/uL Final     Triglycerides   Date Value Ref Range Status   02/08/2022 443 (H) 0 - 149 mg/dL Final   10/21/2019 524 (H) 0 - 149 mg/dL Final     HDL Cholesterol   Date Value Ref Range Status   02/08/2022 54 >39 mg/dL Final   10/21/2019 75 65 - 121 mg/dL Final     Comment:     VALUES>60 MG/DL ARE ASSOCIATED WITH A DECREASED RISK OF  ATHEROSCLEROTIC CARDIOVASCULAR DISEASE     LDL Cholesterol    Date Value Ref Range Status   10/21/2019 see below <100 mg/dL Final     Comment:     When the triglyceride is >400 mg/dL the calculated LDL and  VLDL are not valid.  <100 MG/DL=OPITIMAL    100-129 MG/DL=DESIRABLE    130-159 MG/DL BORDERLINE=INCREASED RISK OF ATHEROSCLEROTIC  CARDIOVASCULAR DISEASE    > OR = 160 MG/DL=ASSOCIATED WITH AN INCREASE RISK OF  ATHEROSCLEROTIC CARDIOVASCULAR DISEASE   10/21/2019 100 <100 mg/dL Final     Comment:     <100 MG/DL=OPITIMAL    100-129 MG/DL=DESIRABLE    130-159 MG/DL BORDERLINE=INCREASED RISK OF ATHEROSCLEROTIC  CARDIOVASCULAR DISEASE    > OR = 160 MG/DL=ASSOCIATED WITH AN INCREASE RISK OF  ATHEROSCLEROTIC CARDIOVASCULAR DISEASE     LDL Chol Calc (NIH)   Date Value Ref Range Status   02/08/2022 126 (H) 0 - 99 mg/dL Final     Hemoglobin A1C   Date Value Ref Range Status   12/03/2021 5.1 % Final         Assessment / Plan     Assessment/Plan:  Diagnoses and all orders for this visit:    1. Arthralgia, unspecified joint (Primary)   -stable on tramadol    2. Mixed hyperlipidemia  -     Lipid Panel    3. Long term use of drug  -      ToxASSURE Select 13 (MW) - Urine, Clean Catch    Will consider changing to tricor pending results of lipid panel.     Return in about 3 months (around 7/15/2022). unless patient needs to be seen sooner or acute issues arise.    Code Status: Full  I have discussed the patient results/orders and and plan/recommendation with them at today's visit.      Afua Dey, COLLEEN   04/15/2022

## 2022-04-16 LAB
CHOLEST SERPL-MCNC: 194 MG/DL (ref 100–199)
HDLC SERPL-MCNC: 75 MG/DL
LDLC SERPL CALC-MCNC: 104 MG/DL (ref 0–99)
TRIGL SERPL-MCNC: 86 MG/DL (ref 0–149)
VLDLC SERPL CALC-MCNC: 15 MG/DL (ref 5–40)

## 2022-04-22 LAB — DRUGS UR: NORMAL

## 2022-05-09 DIAGNOSIS — E78.00 HIGH CHOLESTEROL: ICD-10-CM

## 2022-05-09 RX ORDER — ATORVASTATIN CALCIUM 40 MG/1
TABLET, FILM COATED ORAL
Qty: 90 TABLET | Refills: 4 | Status: SHIPPED | OUTPATIENT
Start: 2022-05-09

## 2022-05-09 RX ORDER — GEMFIBROZIL 600 MG/1
TABLET, FILM COATED ORAL
Qty: 60 TABLET | Refills: 0 | Status: SHIPPED | OUTPATIENT
Start: 2022-05-09 | End: 2022-07-26 | Stop reason: SDUPTHER

## 2022-05-09 NOTE — TELEPHONE ENCOUNTER
Rx Refill Note  Requested Prescriptions     Pending Prescriptions Disp Refills   • atorvastatin (LIPITOR) 40 MG tablet [Pharmacy Med Name: Atorvastatin Calcium 40 MG Oral Tablet] 90 tablet 0     Sig: Take 1 tablet by mouth once daily   • gemfibrozil (LOPID) 600 MG tablet [Pharmacy Med Name: Gemfibrozil 600 MG Oral Tablet] 60 tablet 0     Sig: TAKE 1 TABLET BY MOUTH TWICE DAILY BEFORE MEAL(S)      Last office visit with prescribing clinician: 4/15/2022      Next office visit with prescribing clinician: Visit date not found            Thalia Carreon RN  05/09/22, 09:33 CDT

## 2022-05-09 NOTE — TELEPHONE ENCOUNTER
Rx Refill Note  Requested Prescriptions     Pending Prescriptions Disp Refills   • atorvastatin (LIPITOR) 40 MG tablet [Pharmacy Med Name: Atorvastatin Calcium 40 MG Oral Tablet] 90 tablet 0     Sig: Take 1 tablet by mouth once daily   • gemfibrozil (LOPID) 600 MG tablet [Pharmacy Med Name: Gemfibrozil 600 MG Oral Tablet] 60 tablet 0     Sig: TAKE 1 TABLET BY MOUTH TWICE DAILY BEFORE MEAL(S)      Last office visit with prescribing clinician: 4/15/2022      Next office visit with prescribing clinician: Visit date not found            Abby Mcrae CMA  05/09/22, 09:31 CDT

## 2022-05-16 DIAGNOSIS — R07.89 CHEST WALL PAIN: ICD-10-CM

## 2022-05-16 RX ORDER — TRAMADOL HYDROCHLORIDE 50 MG/1
TABLET ORAL
Qty: 30 TABLET | Refills: 0 | Status: SHIPPED | OUTPATIENT
Start: 2022-05-16 | End: 2022-06-09 | Stop reason: SDUPTHER

## 2022-05-16 NOTE — TELEPHONE ENCOUNTER
Rx Refill Note  Requested Prescriptions     Pending Prescriptions Disp Refills   • traMADol (ULTRAM) 50 MG tablet [Pharmacy Med Name: traMADol HCl 50 MG Oral Tablet] 30 tablet 0     Sig: TAKE 1 TABLET BY MOUTH EVERY 8 HOURS AS NEEDED FOR MODERATE PAIN   Med last filled: 4/4/22   Last office visit with prescribing clinician: 4/15/2022      Next office visit with prescribing clinician: Visit date not found     ToxASSURE Select 13 (MW) - Urine, Clean Catch (04/15/2022 10:29)               Thalia Carreon RN  05/16/22, 15:31 CDT

## 2022-06-09 DIAGNOSIS — R07.89 CHEST WALL PAIN: ICD-10-CM

## 2022-06-09 RX ORDER — TRAMADOL HYDROCHLORIDE 50 MG/1
50 TABLET ORAL EVERY 8 HOURS PRN
Qty: 30 TABLET | Refills: 0 | Status: SHIPPED | OUTPATIENT
Start: 2022-06-09 | End: 2022-07-05 | Stop reason: SDUPTHER

## 2022-06-09 NOTE — TELEPHONE ENCOUNTER
Caller: Karen Copeland    Relationship: Self    Best call back number: [phone]    Requested Prescriptions:   Requested Prescriptions     Pending Prescriptions Disp Refills   • traMADol (ULTRAM) 50 MG tablet 30 tablet 0     Sig: Take 1 tablet by mouth Every 8 (Eight) Hours As Needed for Moderate Pain .        Pharmacy where request should be sent: [facility] [address] [place] [address] [phone] [facility] [phone] FX     Does the patient have less than a 3 day supply:  [x] Yes  [] No    Dora Phan Rep   06/09/22 13:34 CDT

## 2022-06-09 NOTE — TELEPHONE ENCOUNTER
How long were you wanting this amount to last patient? This is the second time pt has requested and it was refused last time because it was filled on 5/16/22.

## 2022-06-27 DIAGNOSIS — R07.89 CHEST WALL PAIN: ICD-10-CM

## 2022-06-28 RX ORDER — TRAMADOL HYDROCHLORIDE 50 MG/1
TABLET ORAL
Qty: 30 TABLET | Refills: 0 | OUTPATIENT
Start: 2022-06-28

## 2022-07-05 DIAGNOSIS — R07.89 CHEST WALL PAIN: ICD-10-CM

## 2022-07-05 RX ORDER — TRAMADOL HYDROCHLORIDE 50 MG/1
50 TABLET ORAL EVERY 8 HOURS PRN
Qty: 30 TABLET | Refills: 0 | OUTPATIENT
Start: 2022-07-05

## 2022-07-05 RX ORDER — TRAMADOL HYDROCHLORIDE 50 MG/1
50 TABLET ORAL EVERY 8 HOURS PRN
Qty: 30 TABLET | Refills: 0 | Status: SHIPPED | OUTPATIENT
Start: 2022-07-05 | End: 2022-07-26 | Stop reason: SDUPTHER

## 2022-07-05 NOTE — TELEPHONE ENCOUNTER
Rx Refill Note  Requested Prescriptions     Pending Prescriptions Disp Refills   • traMADol (ULTRAM) 50 MG tablet 30 tablet 0     Sig: Take 1 tablet by mouth Every 8 (Eight) Hours As Needed for Moderate Pain .   Med last filled:  6/9/22  Last office visit with prescribing clinician:   4/15/22  Next office visit with prescribing clinician: Visit date not found     ToxASSURE Select 13 (MW) - Urine, Clean Catch (04/15/2022 10:29)               Thalia Carreon RN  07/05/22, 09:35 CDT

## 2022-07-05 NOTE — TELEPHONE ENCOUNTER
Pt called needing a refill for her TraMADol (ULTRAM) 50 mg sent in to [facility] in [place]. She called last week but they told her it was not due yet. She said she took last pill on Saturday. Thanks!

## 2022-07-25 DIAGNOSIS — R07.89 CHEST WALL PAIN: ICD-10-CM

## 2022-07-25 NOTE — TELEPHONE ENCOUNTER
Incoming Refill Request      Medication requested (name and dose): gemfibrozil (LOPID) 600 MG tablet   traMADol (ULTRAM) 50 MG tablet    Pharmacy where request should be sent: [facility] [place]    Additional details provided by patient: Pt stated she tried to call the office phone number and press option 5, but that number said it was busy. She said it was making a beeping sound    Best call back number: [phone]    Does the patient have less than a 3 day supply:  [x] Yes  [] No    Dora Hartman Rep  07/25/22, 10:55 CDT

## 2022-07-26 RX ORDER — GEMFIBROZIL 600 MG/1
600 TABLET, FILM COATED ORAL
Qty: 60 TABLET | Refills: 0 | Status: SHIPPED | OUTPATIENT
Start: 2022-07-26 | End: 2022-08-22

## 2022-07-26 RX ORDER — TRAMADOL HYDROCHLORIDE 50 MG/1
50 TABLET ORAL EVERY 8 HOURS PRN
Qty: 30 TABLET | Refills: 0 | Status: SHIPPED | OUTPATIENT
Start: 2022-07-26 | End: 2022-08-19

## 2022-07-26 NOTE — TELEPHONE ENCOUNTER
Rx Refill Note  Requested Prescriptions     Pending Prescriptions Disp Refills   • gemfibrozil (LOPID) 600 MG tablet 60 tablet 0     Sig: Take 1 tablet by mouth 2 (Two) Times a Day Before Meals.   • traMADol (ULTRAM) 50 MG tablet 30 tablet 0     Sig: Take 1 tablet by mouth Every 8 (Eight) Hours As Needed for Moderate Pain .      Last office visit with prescribing clinician: 02/08/2022      Next office visit with prescribing clinician: Visit date not found            Zakia Yee MA  07/26/22, 07:42 CDT

## 2022-08-18 DIAGNOSIS — R07.89 CHEST WALL PAIN: ICD-10-CM

## 2022-08-18 NOTE — TELEPHONE ENCOUNTER
NOT DUE UNTIL 8/26/22    Rx Refill Note  Requested Prescriptions     Pending Prescriptions Disp Refills   • traMADol (ULTRAM) 50 MG tablet [Pharmacy Med Name: traMADol HCl 50 MG Oral Tablet] 30 tablet 0     Sig: TAKE 1 TABLET BY MOUTH EVERY 8 HOURS AS NEEDED FOR MODERATE PAIN   Med last filled:  7/26/22  Last office visit with prescribing clinician: 4/15/2022      Next office visit with prescribing clinician: Visit date not found            Thalia Carreon RN  08/18/22, 13:28 CDT

## 2022-08-19 RX ORDER — TRAMADOL HYDROCHLORIDE 50 MG/1
TABLET ORAL
Qty: 30 TABLET | Refills: 0 | Status: SHIPPED | OUTPATIENT
Start: 2022-08-19 | End: 2022-09-12 | Stop reason: SDUPTHER

## 2022-08-22 RX ORDER — GEMFIBROZIL 600 MG/1
TABLET, FILM COATED ORAL
Qty: 4 TABLET | Refills: 0 | Status: SHIPPED | OUTPATIENT
Start: 2022-08-22 | End: 2022-10-05 | Stop reason: SDUPTHER

## 2022-08-22 NOTE — TELEPHONE ENCOUNTER
Rx Refill Note  Requested Prescriptions     Pending Prescriptions Disp Refills   • gemfibrozil (LOPID) 600 MG tablet [Pharmacy Med Name: Gemfibrozil 600 MG Oral Tablet] 4 tablet 0     Sig: TAKE 1 TABLET BY MOUTH TWICE DAILY BEFORE  MEALS      Last office visit with prescribing clinician: 4/15/2022      Next office visit with prescribing clinician: Visit date not found            Abby Mcrae CMA  08/22/22, 07:32 CDT

## 2022-09-12 DIAGNOSIS — R07.89 CHEST WALL PAIN: ICD-10-CM

## 2022-09-12 NOTE — TELEPHONE ENCOUNTER
Caller: Karen Copeland    Relationship: Self    Best call back number: [phone]    Requested Prescriptions:   Requested Prescriptions     Pending Prescriptions Disp Refills   • traMADol (ULTRAM) 50 MG tablet 30 tablet 0     Sig: Take 1 tablet by mouth Every 8 (Eight) Hours As Needed for Moderate Pain.        Pharmacy where request should be sent: [facility] [address] [place] [address] [phone] [facility] [phone] FX     Does the patient have less than a 3 day supply:  [x] Yes  [] No    Dora Phan Rep   09/12/22 10:07 CDT

## 2022-09-12 NOTE — TELEPHONE ENCOUNTER
NOT DUE YET    Rx Refill Note  Requested Prescriptions     Pending Prescriptions Disp Refills   • traMADol (ULTRAM) 50 MG tablet 30 tablet 0     Sig: Take 1 tablet by mouth Every 8 (Eight) Hours As Needed for Moderate Pain.   Med last filled:  8/19/22  Last office visit with prescribing clinician: 4/15/2022      Next office visit with prescribing clinician: Visit date not found- WANT PT SEEN FIRST?             Thalia Carreon RN  09/12/22, 10:13 CDT

## 2022-09-16 ENCOUNTER — OFFICE VISIT (OUTPATIENT)
Dept: INTERNAL MEDICINE | Facility: CLINIC | Age: 67
End: 2022-09-16

## 2022-09-16 VITALS
RESPIRATION RATE: 18 BRPM | TEMPERATURE: 98 F | OXYGEN SATURATION: 99 % | SYSTOLIC BLOOD PRESSURE: 118 MMHG | BODY MASS INDEX: 21.71 KG/M2 | WEIGHT: 118 LBS | HEIGHT: 62 IN | HEART RATE: 79 BPM | DIASTOLIC BLOOD PRESSURE: 74 MMHG

## 2022-09-16 DIAGNOSIS — Z00.00 MEDICARE ANNUAL WELLNESS VISIT, SUBSEQUENT: Primary | ICD-10-CM

## 2022-09-16 DIAGNOSIS — Z78.0 POST-MENOPAUSE: ICD-10-CM

## 2022-09-16 DIAGNOSIS — Z23 NEED FOR PNEUMOCOCCAL VACCINATION: ICD-10-CM

## 2022-09-16 DIAGNOSIS — Z79.899 LONG TERM USE OF DRUG: ICD-10-CM

## 2022-09-16 LAB
AMPHET+METHAMPHET UR QL: NEGATIVE
AMPHETAMINE INTERNAL CONTROL: ABNORMAL
AMPHETAMINES UR QL: NEGATIVE
BARBITURATE INTERNAL CONTROL: ABNORMAL
BARBITURATES UR QL SCN: NEGATIVE
BENZODIAZ UR QL SCN: NEGATIVE
BENZODIAZEPINE INTERNAL CONTROL: ABNORMAL
BUPRENORPHINE INTERNAL CONTROL: ABNORMAL
BUPRENORPHINE SERPL-MCNC: NEGATIVE NG/ML
CANNABINOIDS SERPL QL: POSITIVE
COCAINE INTERNAL CONTROL: ABNORMAL
COCAINE UR QL: NEGATIVE
EDDP INTERNAL CONTROL: ABNORMAL
EDDP UR QL SCN: NEGATIVE
MDMA (ECSTASY) INTERNAL CONTROL: ABNORMAL
MDMA UR QL SCN: NEGATIVE
METHADONE INTERNAL CONTROL: ABNORMAL
METHADONE UR QL SCN: NEGATIVE
METHAMPHETAMINE INTERNAL CONTROL: ABNORMAL
MORPHINE INTERNAL CONTROL: ABNORMAL
MORPHINE UR QL SCN: NEGATIVE
OXYCODONE INTERNAL CONTROL: ABNORMAL
OXYCODONE UR QL SCN: NEGATIVE
PCP UR QL SCN: NEGATIVE
PHENCYCLIDINE INTERNAL CONTROL: ABNORMAL
PROPOXYPH UR QL SCN: NEGATIVE
PROPOXYPHENE INTERNAL CONTROL: ABNORMAL
THC INTERNAL CONTROL: ABNORMAL
TRICYCLIC ANTIDEPRESSANTS INTERNAL CONTROL: ABNORMAL
TRICYCLICS UR QL SCN: NEGATIVE

## 2022-09-16 PROCEDURE — 80305 DRUG TEST PRSMV DIR OPT OBS: CPT | Performed by: NURSE PRACTITIONER

## 2022-09-16 PROCEDURE — G0009 ADMIN PNEUMOCOCCAL VACCINE: HCPCS | Performed by: NURSE PRACTITIONER

## 2022-09-16 PROCEDURE — 90677 PCV20 VACCINE IM: CPT | Performed by: NURSE PRACTITIONER

## 2022-09-16 PROCEDURE — G0439 PPPS, SUBSEQ VISIT: HCPCS | Performed by: NURSE PRACTITIONER

## 2022-09-16 RX ORDER — TRAMADOL HYDROCHLORIDE 50 MG/1
50 TABLET ORAL EVERY 8 HOURS PRN
Qty: 30 TABLET | Refills: 0 | Status: SHIPPED | OUTPATIENT
Start: 2022-09-16 | End: 2022-10-10 | Stop reason: SDUPTHER

## 2022-09-16 NOTE — PROGRESS NOTES
The ABCs of the Annual Wellness Visit  Subsequent Medicare Wellness Visit    Chief Complaint   Patient presents with   • Annual Exam      Subjective    History of Present Illness:  Karen Copeland is a 67 y.o. female who presents for a Subsequent Medicare Wellness Visit.    She states that she is not sure how to improve her cholesterol as she has eliminated pasta and bread from her diet and eats a significant amount of fruits. She attributes her high cholesterol to genetics as her mother and sister both had high cholesterol. She does eat a significant amount of hamburger meat and turkey lunch meat, but notes that she has been trying to eat chicken more often. She states that she has been taking Lipitor for a significant length of time.     She states that she was seen by Dr. Sloan with rheumatology and she was informed that she does not have lupus after blood work that had been completed in office from September of 2021.     The patient requests a refill of tramadol.    Review of systems:   patient states that she is feeling well overall. She states that she does have arthritis. She denies experiencing any appetite change, unexpected weight loss, fever, cough, shortness of breath, runny nose, leg swelling, palpitations, abdominal pain, bowel or bladder issues, blood in her stool or urine, abnormal muscle or joint pains, new rashes or wounds, dizziness, headache, seizures, syncope, glaucoma, or trouble sleeping. She denies any changes in mood such as depressed or anxious feelings. She denies experiencing any problems with her memory. She has not been admitted into the hospital within the last year.       The following portions of the patient's history were reviewed and   updated as appropriate: She  has a past medical history of Anxiety, Depression, Hyperlipidemia, Liver enzyme elevation, and Sleep apnea.  She does not have any pertinent problems on file.  She  has no past surgical history on file.  Her family  history includes ADD / ADHD in her son; Arthritis in her mother and sister; Breast cancer in her sister; Cancer in her mother and sister; Hyperlipidemia in her mother; Lupus in her brother, maternal aunt, and sister..    Compared to one year ago, the patient feels her physical   health is the same.    Compared to one year ago, the patient feels her mental   health is the same.    Recent Hospitalizations:  She was not admitted to the hospital during the last year.       Current Medical Providers:  Patient Care Team:  Afua Dey APRN as PCP - General (Nurse Practitioner)    Outpatient Medications Prior to Visit   Medication Sig Dispense Refill   • aspirin 81 MG tablet Take 81 mg by mouth Daily.     • atorvastatin (LIPITOR) 40 MG tablet Take 1 tablet by mouth once daily 90 tablet 4   • b complex vitamins capsule Take 1 capsule by mouth Daily.     • coenzyme Q10 50 MG capsule capsule Take 50 mg by mouth Daily.     • gemfibrozil (LOPID) 600 MG tablet TAKE 1 TABLET BY MOUTH TWICE DAILY BEFORE  MEALS 4 tablet 0   • PARoxetine (PAXIL) 20 MG tablet TAKE 1 TABLET BY MOUTH ONCE DAILY IN THE MORNING 30 tablet 3   • therapeutic multivitamin-minerals (THERAGRAN-M) tablet Take 1 tablet by mouth Daily.     • vitamin C (ASCORBIC ACID) 500 MG tablet Take 500 mg by mouth Daily.     • traMADol (ULTRAM) 50 MG tablet TAKE 1 TABLET BY MOUTH EVERY 8 HOURS AS NEEDED FOR MODERATE PAIN 30 tablet 0     No facility-administered medications prior to visit.       Opioid medication/s are on active medication list.  and I have evaluated her active treatment plan and pain score trends (see table).  There were no vitals filed for this visit.  I have reviewed the chart for potential of high risk medication and harmful drug interactions in the elderly.            Aspirin is on active medication list. Aspirin use is indicated based on review of current medical condition/s. Pros and cons of this therapy have been discussed today. Benefits  "of this medication outweigh potential harm.  Patient has been encouraged to continue taking this medication.  .      Patient Active Problem List   Diagnosis   • Anxiety   • Arthritis of shoulder   • Chronic cough   • Depression   • Effusion of sternoclavicular joint   • Fatigue   • Hip pain, left   • Hyperlipidemia   • Joint swelling   • Spasm   • High risk medication use   • Acute costochondritis   • RUQ pain   • Right-sided chest wall pain   • Arthralgia   • Elevated blood pressure reading without diagnosis of hypertension   • Elevated antinuclear antibody (RIVAS) level     Advance Care Planning  Advance Directive is not on file.  ACP discussion was held with the patient during this visit. Patient has an advance directive (not in EMR), copy requested.          Objective    Vitals:    09/16/22 1025   BP: 118/74   Pulse: 79   Resp: 18   Temp: 98 °F (36.7 °C)   SpO2: 99%   Weight: 53.5 kg (118 lb)   Height: 157.5 cm (62.01\")     Estimated body mass index is 21.58 kg/m² as calculated from the following:    Height as of this encounter: 157.5 cm (62.01\").    Weight as of this encounter: 53.5 kg (118 lb).    BMI is within normal parameters. No other follow-up for BMI required.      Does the patient have evidence of cognitive impairment? No    Physical Exam  Constitutional:       Appearance: Normal appearance. She is well-developed.   HENT:      Head: Normocephalic and atraumatic.      Right Ear: Tympanic membrane normal.      Left Ear: Tympanic membrane normal.   Eyes:      Conjunctiva/sclera: Conjunctivae normal.      Pupils: Pupils are equal, round, and reactive to light.   Neck:      Vascular: No JVD.   Cardiovascular:      Rate and Rhythm: Normal rate and regular rhythm.      Pulses: Normal pulses.      Heart sounds: Normal heart sounds. No murmur heard.    No friction rub. No gallop.   Pulmonary:      Effort: Pulmonary effort is normal. No respiratory distress.      Breath sounds: Normal breath sounds. No wheezing or " rales.   Chest:      Chest wall: No tenderness.   Abdominal:      General: Bowel sounds are normal. There is no distension.      Palpations: Abdomen is soft.      Tenderness: There is no abdominal tenderness. There is no guarding or rebound.   Musculoskeletal:         General: No tenderness or deformity. Normal range of motion.      Cervical back: Neck supple.   Skin:     General: Skin is warm and dry.      Findings: No rash.   Neurological:      Mental Status: She is alert and oriented to person, place, and time.      Cranial Nerves: No cranial nerve deficit.      Motor: No abnormal muscle tone.      Deep Tendon Reflexes: Reflexes normal.   Psychiatric:         Behavior: Behavior normal.         Thought Content: Thought content normal.         Judgment: Judgment normal.     Results:  Laboratory studies performed 02/08/2022:  HDL cholesterol: 54 mg/dL.  LDL cholesterol: 126 mg/dL.  Triglycerides: 443 mg/dL.  Total cholesterol: 209 mg/dL.    Laboratory studies performed 04/15/2022:  HDL cholesterol: 75 mg/dL.  LDL cholesterol: 104 mg/dL.  Triglycerides: 86 mg/dL.  Total cholesterol: 194 mg/dL.            HEALTH RISK ASSESSMENT    Smoking Status:  Social History     Tobacco Use   Smoking Status Current Every Day Smoker   • Packs/day: 0.25   • Years: 15.00   • Pack years: 3.75   • Types: Cigarettes   • Last attempt to quit: 1/1/2019   • Years since quitting: 3.7   Smokeless Tobacco Never Used   Tobacco Comment    quit smoking     Alcohol Consumption:  Social History     Substance and Sexual Activity   Alcohol Use Yes   • Alcohol/week: 10.0 standard drinks   • Types: 10 Glasses of wine per week    Comment: Pt stated she drinks about a 5th     Fall Risk Screen:    STEADI Fall Risk Assessment was completed, and patient is at LOW risk for falls.Assessment completed on:2/8/2022    Depression Screening:  PHQ-2/PHQ-9 Depression Screening 9/16/2022   Retired PHQ-9 Total Score -   Retired Total Score -   Little Interest or  Pleasure in Doing Things 0-->not at all   Feeling Down, Depressed or Hopeless 0-->not at all   PHQ-9: Brief Depression Severity Measure Score 0       Health Habits and Functional and Cognitive Screening:  No flowsheet data found.    Age-appropriate Screening Schedule:  Refer to the list below for future screening recommendations based on patient's age, sex and/or medical conditions. Orders for these recommended tests are listed in the plan section. The patient has been provided with a written plan.    Health Maintenance   Topic Date Due   • DXA SCAN  Never done   • TDAP/TD VACCINES (1 - Tdap) 06/23/2016   • ZOSTER VACCINE (1 of 2) 09/16/2022 (Originally 5/28/2005)   • LIPID PANEL  04/15/2023   • MAMMOGRAM  02/15/2024   • INFLUENZA VACCINE  Discontinued              Assessment & Plan   CMS Preventative Services Quick Reference  Risk Factors Identified During Encounter  Chronic Pain   Tobacco Use/Dependance (use dotphrase .tobaccocessation for documentation)  The above risks/problems have been discussed with the patient.  Follow up actions/plans if indicated are seen below in the Assessment/Plan Section.  Pertinent information has been shared with the patient in the After Visit Summary.    Diagnoses and all orders for this visit:    1. Medicare annual wellness visit, subsequent (Primary)   Her last labs looked good overall and she has reduced her tobacco use.   - Discussed healthier meat alternatives such as ground turkey to substitute for hamburger meat and advised her against eating lunch meat. We discussed needing to increase her activity which can be limited by her chronic pain.   - She will continue taking baby aspirin daily as well as her other current medications.   - [facility] as she does not currently have advanced care planning, a living will, or power of .    2. Need for pneumococcal vaccination  -     Pneumococcal Conjugate Vaccine 20-Valent (PCV20)    3.  Post-menopause  -     DEXA Bone Density Axial; Future    4. Long term use of drug  -     POC Urine Drug Screen ([facility] PATI[facility]O ONLY)    -Follow Up:   Return in about 3 months (around 12/16/2022).     An After Visit Summary and PPPS were made available to the patient.     Transcribed from ambient dictation for COLLEEN Patton by TIARA RODRIGUEZ, Quality .  09/16/22   12:41 CDT    Patient verbalized consent to the visit recording.

## 2022-10-05 RX ORDER — GEMFIBROZIL 600 MG/1
600 TABLET, FILM COATED ORAL
Qty: 60 TABLET | Refills: 6 | Status: SHIPPED | OUTPATIENT
Start: 2022-10-05

## 2022-10-05 NOTE — TELEPHONE ENCOUNTER
Rx Refill Note  Requested Prescriptions     Pending Prescriptions Disp Refills   • gemfibrozil (LOPID) 600 MG tablet 4 tablet 0     Sig: Take 1 tablet by mouth 2 (Two) Times a Day Before Meals.      Last office visit with prescribing clinician: 9/16/2022      Next office visit with prescribing clinician: 12/16/2022            Thalia Carreon RN  10/05/22, 10:05 CDT

## 2022-10-05 NOTE — TELEPHONE ENCOUNTER
Caller: Alexandra Copelandvenice ALCANTARA    Relationship: Self    Best call back number: [phone]    Requested Prescriptions:   Requested Prescriptions     Pending Prescriptions Disp Refills   • gemfibrozil (LOPID) 600 MG tablet 4 tablet 0     Sig: Take 1 tablet by mouth 2 (Two) Times a Day Before Meals.        Pharmacy where request should be sent: [facility] [address] [place] [address] [phone] [facility] [phone] FX     Please advise when and if medication can be called in. If unable to be filled, please advise with callback at the phone number listed above.    Thank you,  Leodan Garcia, PCT

## 2022-10-10 DIAGNOSIS — R07.89 CHEST WALL PAIN: ICD-10-CM

## 2022-10-10 NOTE — TELEPHONE ENCOUNTER
Rx Refill Note  Requested Prescriptions     Pending Prescriptions Disp Refills   • traMADol (ULTRAM) 50 MG tablet 30 tablet 0     Sig: Take 1 tablet by mouth Every 8 (Eight) Hours As Needed for Moderate Pain.      Last office visit with prescribing clinician: 9/16/2022      Next office visit with prescribing clinician: 12/16/2022     POC Urine Drug Screen ([facility] PATI[facility] ONLY) (09/16/2022 11:25)             Velma Edwards  10/10/22, 14:02 CDT

## 2022-10-10 NOTE — TELEPHONE ENCOUNTER
Caller: TALIA ADKINSN     Relationship: SELF     Best call back number: [phone]    Requested Prescriptions:       traMADol (ULTRAM) 50 MG tablet  50 mg, Every 8 Hours PRN       Pharmacy where request should be sent:    [address] - [phone]  - [phone]   [phone]  Additional details provided by patient: NONE   Does the patient have less than a 3 day supply:  [x] Yes  [] No    Dora Benz Rep   10/10/22 13:32 CDT

## 2022-10-17 RX ORDER — TRAMADOL HYDROCHLORIDE 50 MG/1
50 TABLET ORAL EVERY 8 HOURS PRN
Qty: 30 TABLET | Refills: 0 | Status: SHIPPED | OUTPATIENT
Start: 2022-10-17 | End: 2022-11-07 | Stop reason: SDUPTHER

## 2022-11-07 DIAGNOSIS — R07.89 CHEST WALL PAIN: ICD-10-CM

## 2022-11-07 NOTE — TELEPHONE ENCOUNTER
Caller: Karen Copeland    Relationship: Self    Best call back number: [phone]    Requested Prescriptions:   Requested Prescriptions     Pending Prescriptions Disp Refills   • traMADol (ULTRAM) 50 MG tablet 30 tablet 0     Sig: Take 1 tablet by mouth Every 8 (Eight) Hours As Needed for Moderate Pain.        Pharmacy where request should be sent: [facility] [address] [place] [address] [phone] [facility] [phone]      Additional details provided by patient: ONE DAY LEFT    Does the patient have less than a 3 day supply:  [x] Yes  [] No    Dora Jacinto Rep   11/07/22 14:46 CST

## 2022-11-08 RX ORDER — TRAMADOL HYDROCHLORIDE 50 MG/1
50 TABLET ORAL EVERY 8 HOURS PRN
Qty: 30 TABLET | Refills: 0 | Status: SHIPPED | OUTPATIENT
Start: 2022-11-08 | End: 2022-11-30 | Stop reason: SDUPTHER

## 2022-11-30 DIAGNOSIS — R07.89 CHEST WALL PAIN: ICD-10-CM

## 2022-12-02 RX ORDER — TRAMADOL HYDROCHLORIDE 50 MG/1
50 TABLET ORAL EVERY 8 HOURS PRN
Qty: 30 TABLET | Refills: 0 | Status: SHIPPED | OUTPATIENT
Start: 2022-12-02 | End: 2022-12-28

## 2022-12-16 ENCOUNTER — OFFICE VISIT (OUTPATIENT)
Dept: INTERNAL MEDICINE | Facility: CLINIC | Age: 67
End: 2022-12-16

## 2022-12-16 VITALS
WEIGHT: 121 LBS | TEMPERATURE: 97.5 F | OXYGEN SATURATION: 98 % | RESPIRATION RATE: 17 BRPM | SYSTOLIC BLOOD PRESSURE: 142 MMHG | HEART RATE: 84 BPM | DIASTOLIC BLOOD PRESSURE: 86 MMHG | HEIGHT: 62 IN | BODY MASS INDEX: 22.26 KG/M2

## 2022-12-16 DIAGNOSIS — E78.00 HIGH CHOLESTEROL: Primary | ICD-10-CM

## 2022-12-16 DIAGNOSIS — Z79.899 LONG TERM USE OF DRUG: ICD-10-CM

## 2022-12-16 DIAGNOSIS — Z12.31 ENCOUNTER FOR SCREENING MAMMOGRAM FOR MALIGNANT NEOPLASM OF BREAST: ICD-10-CM

## 2022-12-16 PROCEDURE — 99214 OFFICE O/P EST MOD 30 MIN: CPT | Performed by: NURSE PRACTITIONER

## 2022-12-16 NOTE — PROGRESS NOTES
"        Subjective     Chief Complaint   Patient presents with   • Hyperlipidemia   • Pain     Myalgia     The patient presents today for a follow-up on her cholesterol and pain.    History of Present Illness    Chronic pain  The patient reports that she has pain all over her body. She states that the pain is mainly in her chest wall. The patient reports that she has been trying to get her  to buy her the couch. She denies seeing pain management. The patient reports that she has lidocaine patches and she uses a roll-on occasionally. She states that this past month it has been\" catching her and it is locking up.\" The patient reports that she is fine.     Overall health maintenance  She states that she has not had her bone density test. She reports that she will call scheduling in 02/2023 and have her mammogram and bone density test at the same time. She states that she takes Lipitor once a day. The patient notes that she is not taking Paxil. She states that she takes two cholesterol pills, aspirin and CoQ10. The patient denies taking a multivitamin. The patient reports that she takes tramadol which is beneficial. She states that she tries to take tramadol once a day. She may take a tramadol tonight before bed. The patient states that she knows it is a controlled substance and she does not want to get to the point that she needs it. She reports that she will take Tylenol instead of 2 tramadol. The patient states that Ori will not let her take ibuprofen. She reports that she smokes tobacco. The patient states that she is down to 3 packs a week.The patient works at [employer].      Patient's PMR from outside medical facility reviewed and noted.    Review of Systems   Otherwise complete ROS reviewed and negative except as mentioned in the HPI.    Past Medical History:   Past Medical History:   Diagnosis Date   • Anxiety    • Depression    • Hyperlipidemia    • Liver enzyme elevation    • Sleep " apnea      Past Surgical History:History reviewed. No pertinent surgical history.  Social History:  reports that she has been smoking cigarettes. She has a 3.75 pack-year smoking history. She has never used smokeless tobacco. She reports current alcohol use of about 10.0 standard drinks per week. She reports current drug use. Drug: Marijuana.    Family History: family history includes ADD / ADHD in her son; Arthritis in her mother and sister; Breast cancer in her sister; Cancer in her mother and sister; Hyperlipidemia in her mother; Lupus in her brother, maternal aunt, and sister.       Allergies:  Allergies   Allergen Reactions   • Amoxicillin Diarrhea, Nausea Only and Other (See Comments)     Stomach cramping and fatigue   • Peanut-Containing Drug Products Shortness Of Breath   • Diclofenac Sodium Unknown - High Severity     Flushing and soa     Medications:  Prior to Admission medications    Medication Sig Start Date End Date Taking? Authorizing Provider   aspirin 81 MG tablet Take 81 mg by mouth Daily.    Moshe Joshi MD   atorvastatin (LIPITOR) 40 MG tablet Take 1 tablet by mouth once daily 5/9/22   Afua Dey APRN   b complex vitamins capsule Take 1 capsule by mouth Daily.    ProviderMoshe MD   coenzyme Q10 50 MG capsule capsule Take 50 mg by mouth Daily.    Moshe Joshi MD   gemfibrozil (LOPID) 600 MG tablet Take 1 tablet by mouth 2 (Two) Times a Day Before Meals. 10/5/22   Afua Dey APRN   PARoxetine (PAXIL) 20 MG tablet TAKE 1 TABLET BY MOUTH ONCE DAILY IN THE MORNING 5/19/20   Jessica Fierro DO   therapeutic multivitamin-minerals (THERAGRAN-M) tablet Take 1 tablet by mouth Daily.    Moshe Joshi MD   traMADol (ULTRAM) 50 MG tablet Take 1 tablet by mouth Every 8 (Eight) Hours As Needed for Moderate Pain. 12/2/22   Afua Dey APRN   vitamin C (ASCORBIC ACID) 500 MG tablet Take 500 mg by mouth Daily.    Moshe Joshi  "MD       Objective     Vital Signs: /86   Pulse 84   Temp 97.5 °F (36.4 °C)   Resp 17   Ht 157.5 cm (62.01\")   Wt 54.9 kg (121 lb)   SpO2 98%   BMI 22.12 kg/m²   Physical Exam  Vitals reviewed.   Constitutional:       Appearance: She is well-developed.   HENT:      Head: Normocephalic and atraumatic.   Eyes:      Pupils: Pupils are equal, round, and reactive to light.   Neck:      Vascular: No JVD.   Cardiovascular:      Rate and Rhythm: Normal rate and regular rhythm.   Pulmonary:      Effort: Pulmonary effort is normal.      Comments: Diminished bilaterally  Abdominal:      General: Bowel sounds are normal.      Palpations: Abdomen is soft.   Musculoskeletal:         General: No deformity.      Cervical back: Normal range of motion and neck supple.   Lymphadenopathy:      Cervical: No cervical adenopathy.   Skin:     General: Skin is warm and dry.   Neurological:      Mental Status: She is alert and oriented to person, place, and time.   Psychiatric:         Behavior: Behavior normal.         Thought Content: Thought content normal.         Judgment: Judgment normal.       BMI is within normal parameters. No other follow-up for BMI required.      Results Reviewed:  Glucose   Date Value Ref Range Status   02/08/2022 108 (H) 65 - 99 mg/dL Final   10/21/2019 106 74 - 109 mg/dL Final     BUN   Date Value Ref Range Status   02/08/2022 10 8 - 27 mg/dL Final   10/21/2019 11 8 - 23 mg/dL Final     Creatinine   Date Value Ref Range Status   02/08/2022 0.60 0.57 - 1.00 mg/dL Final   10/21/2019 0.5 0.5 - 0.9 mg/dL Final     Sodium   Date Value Ref Range Status   02/08/2022 141 134 - 144 mmol/L Final   10/21/2019 141 136 - 145 mmol/L Final     Potassium   Date Value Ref Range Status   02/08/2022 3.9 3.5 - 5.2 mmol/L Final   10/21/2019 3.7 3.5 - 5.0 mmol/L Final     Chloride   Date Value Ref Range Status   02/08/2022 102 96 - 106 mmol/L Final   10/21/2019 102 98 - 111 mmol/L Final     CO2   Date Value Ref Range " Status   10/21/2019 20 (L) 22 - 29 mmol/L Final     Total CO2   Date Value Ref Range Status   02/08/2022 22 20 - 29 mmol/L Final     Calcium   Date Value Ref Range Status   02/08/2022 9.7 8.7 - 10.3 mg/dL Final   10/21/2019 9.5 8.8 - 10.2 mg/dL Final     ALT (SGPT)   Date Value Ref Range Status   11/16/2021 24 0 - 32 IU/L Final   11/19/2019 35 (H) 5 - 33 U/L Final     AST (SGOT)   Date Value Ref Range Status   11/16/2021 19 0 - 40 IU/L Final   11/19/2019 28 5 - 32 U/L Final     WBC   Date Value Ref Range Status   08/03/2021 5.3 3.4 - 10.8 x10E3/uL Final   10/21/2019 6.2 4.8 - 10.8 K/uL Final     Hematocrit   Date Value Ref Range Status   08/03/2021 39.9 34.0 - 46.6 % Final   10/21/2019 45.7 37.0 - 47.0 % Final     Platelets   Date Value Ref Range Status   08/03/2021 300 150 - 450 x10E3/uL Final   10/21/2019 250 130 - 400 K/uL Final     Triglycerides   Date Value Ref Range Status   04/15/2022 86 0 - 149 mg/dL Final   10/21/2019 524 (H) 0 - 149 mg/dL Final     HDL Cholesterol   Date Value Ref Range Status   04/15/2022 75 >39 mg/dL Final   10/21/2019 75 65 - 121 mg/dL Final     Comment:     VALUES>60 MG/DL ARE ASSOCIATED WITH A DECREASED RISK OF  ATHEROSCLEROTIC CARDIOVASCULAR DISEASE     LDL Cholesterol    Date Value Ref Range Status   10/21/2019 see below <100 mg/dL Final     Comment:     When the triglyceride is >400 mg/dL the calculated LDL and  VLDL are not valid.  <100 MG/DL=OPITIMAL    100-129 MG/DL=DESIRABLE    130-159 MG/DL BORDERLINE=INCREASED RISK OF ATHEROSCLEROTIC  CARDIOVASCULAR DISEASE    > OR = 160 MG/DL=ASSOCIATED WITH AN INCREASE RISK OF  ATHEROSCLEROTIC CARDIOVASCULAR DISEASE   10/21/2019 100 <100 mg/dL Final     Comment:     <100 MG/DL=OPITIMAL    100-129 MG/DL=DESIRABLE    130-159 MG/DL BORDERLINE=INCREASED RISK OF ATHEROSCLEROTIC  CARDIOVASCULAR DISEASE    > OR = 160 MG/DL=ASSOCIATED WITH AN INCREASE RISK OF  ATHEROSCLEROTIC CARDIOVASCULAR DISEASE     LDL Chol Calc ([facility])   Date Value Ref Range  Status   04/15/2022 104 (H) 0 - 99 mg/dL Final     Hemoglobin A1C   Date Value Ref Range Status   12/03/2021 5.1 % Final         Assessment / Plan     Assessment/Plan:  Diagnoses and all orders for this visit:    1. High cholesterol (Primary)  -     Lipid Panel    2. Encounter for screening mammogram for malignant neoplasm of breast  -     Mammo Screening Digital Tomosynthesis Bilateral With CAD; Future    3. Long term use of drug  -     Pain Management Profile (13 Drugs) Urine - Urine, Clean Catch      Return in about 3 months (around 3/16/2023). unless patient needs to be seen sooner or acute issues arise.    I have discussed the patient results/orders and and plan/recommendation with them at today's visit.      COLLEEN Patton   12/16/2022    Transcribed from ambient dictation for COLLEEN Patton by Afua Travis.  12/16/22   11:58 CST    Patient or patient representative verbalized consent to the visit recording.  I have personally performed the services described in this document as transcribed by the above individual, and it is both accurate and complete.

## 2022-12-17 LAB
CHOLEST SERPL-MCNC: 209 MG/DL (ref 100–199)
HDLC SERPL-MCNC: 75 MG/DL
LDLC SERPL CALC-MCNC: 108 MG/DL (ref 0–99)
TRIGL SERPL-MCNC: 152 MG/DL (ref 0–149)
VLDLC SERPL CALC-MCNC: 26 MG/DL (ref 5–40)

## 2022-12-22 LAB
AMPHETAMINES UR QL SCN: NORMAL NG/ML
BARBITURATES UR QL SCN: NORMAL
BENZODIAZ UR QL SCN: NORMAL
BZE UR QL SCN: NORMAL
CANNABINOIDS UR QL SCN: NORMAL
FENTANYL UR-MCNC: NORMAL NG/ML
MEPERIDINE UR QL: NORMAL
METHADONE UR QL SCN: NORMAL
OPIATES UR QL SCN: NORMAL
OXYCODONE+OXYMORPHONE UR QL SCN: NORMAL
PCP UR QL: NORMAL
PROPOXYPH UR QL SCN: NORMAL
REQUEST PROBLEM: NORMAL
TRAMADOL UR QL SCN: NORMAL

## 2022-12-22 NOTE — PROGRESS NOTES
Called patient to let her know we needed to recollect, she voiced understanding and will come in next week

## 2022-12-26 DIAGNOSIS — R07.89 CHEST WALL PAIN: ICD-10-CM

## 2022-12-27 DIAGNOSIS — R07.89 CHEST WALL PAIN: ICD-10-CM

## 2022-12-27 RX ORDER — TRAMADOL HYDROCHLORIDE 50 MG/1
50 TABLET ORAL EVERY 8 HOURS PRN
Qty: 30 TABLET | Refills: 0 | OUTPATIENT
Start: 2022-12-27

## 2022-12-27 NOTE — TELEPHONE ENCOUNTER
Caller: Karen Copeland    Relationship: Self    Best call back number:[phone]    Requested Prescriptions:   Requested Prescriptions     Pending Prescriptions Disp Refills   • traMADol (ULTRAM) 50 MG tablet 30 tablet 0     Sig: Take 1 tablet by mouth Every 8 (Eight) Hours As Needed for Moderate Pain.        Pharmacy where request should be sent: [facility] [address] [place] [address] [phone] [facility] [phone]      Additional details provided by patient: PATIENT IS OUT OF MEDICATION     Does the patient have less than a 3 day supply:  [x] Yes  [] No    Would you like a call back once the refill request has been completed: [x] Yes [] No    If the office needs to give you a call back, can they leave a voicemail: [x] Yes [] No    Dora Mercado Rep   12/27/22 11:35 CST

## 2022-12-27 NOTE — TELEPHONE ENCOUNTER
Rx Refill Note  Requested Prescriptions     Refused Prescriptions Disp Refills   • traMADol (ULTRAM) 50 MG tablet 30 tablet 0     Sig: Take 1 tablet by mouth Every 8 (Eight) Hours As Needed for Moderate Pain.      Last office visit with prescribing clinician: 12/16/2022   Last telemedicine visit with prescribing clinician: 3/17/2023   Next office visit with prescribing clinician:   3/17/2023     DUPLICATE                {TIP  Is Refill Pharmacy correct?:23}    Would you like a call back once the refill request has been completed: [] Yes [] No    If the office needs to give you a call back, can they leave a voicemail: [] Yes [] No    Zakia Yee MA  12/27/22, 11:57 CST

## 2022-12-27 NOTE — TELEPHONE ENCOUNTER
Rx Refill Note  Requested Prescriptions     Pending Prescriptions Disp Refills   • traMADol (ULTRAM) 50 MG tablet [Pharmacy Med Name: traMADol HCl 50 MG Oral Tablet] 30 tablet 0     Sig: TAKE 1 TABLET BY MOUTH EVERY 8 HOURS AS NEEDED FOR MODERATE PAIN   Med last filled: 12/02/22   Last office visit with prescribing clinician: 12/16/2022   Next office visit with prescribing clinician: 3/17/2023     Pain Management Profile (13 Drugs) Urine - Urine, Clean Catch (12/16/2022 14:07)                              Would you like a call back once the refill request has been completed: [] Yes [] No    If the office needs to give you a call back, can they leave a voicemail: [] Yes [] No    Thalia Carreon RN  12/27/22, 07:21 CST

## 2022-12-28 RX ORDER — TRAMADOL HYDROCHLORIDE 50 MG/1
TABLET ORAL
Qty: 30 TABLET | Refills: 0 | Status: SHIPPED | OUTPATIENT
Start: 2022-12-28 | End: 2023-01-19 | Stop reason: SDUPTHER

## 2023-01-19 DIAGNOSIS — R07.89 CHEST WALL PAIN: ICD-10-CM

## 2023-01-19 DIAGNOSIS — R07.89 CHEST WALL PAIN: Primary | ICD-10-CM

## 2023-01-19 RX ORDER — TRAMADOL HYDROCHLORIDE 50 MG/1
50 TABLET ORAL EVERY 8 HOURS PRN
Qty: 30 TABLET | Refills: 0 | Status: SHIPPED | OUTPATIENT
Start: 2023-01-19 | End: 2023-02-17 | Stop reason: SDUPTHER

## 2023-01-19 NOTE — TELEPHONE ENCOUNTER
Caller: Karen Copeland    Relationship: Self    Best call back number: [phone]    Requested Prescriptions:   Requested Prescriptions     Pending Prescriptions Disp Refills   • traMADol (ULTRAM) 50 MG tablet 30 tablet 0     Sig: Take 1 tablet by mouth Every 8 (Eight) Hours As Needed for Moderate Pain.        Pharmacy where request should be sent: [facility] [address] [place] [address] [phone] [facility] [phone]      Additional details provided by patient: PATIENT TOOK LAST PILL THIS MORNING       Does the patient have less than a 3 day supply:  [x] Yes  [] No    Would you like a call back once the refill request has been completed: [x] Yes [] No    If the office needs to give you a call back, can they leave a voicemail: [x] Yes [] No    Jackie Hall, Dora Rep   01/19/23 08:38 CST

## 2023-01-19 NOTE — TELEPHONE ENCOUNTER
IS THIS SCRIPT SUPPOSED TO BE FOR 30 DAYS?       Rx Refill Note  Requested Prescriptions     Pending Prescriptions Disp Refills   • traMADol (ULTRAM) 50 MG tablet 30 tablet 0     Sig: Take 1 tablet by mouth Every 8 (Eight) Hours As Needed for Moderate Pain.   Med last filled: 12/28/22   Last office visit with prescribing clinician: 12/16/2022   Next office visit with prescribing clinician: 3/17/2023    Pain Management Profile (13 Drugs) Urine - Urine, Clean Catch (12/16/2022 14:07)                             Would you like a call back once the refill request has been completed: [] Yes [] No    If the office needs to give you a call back, can they leave a voicemail: [] Yes [] No    Thalia Carreon RN  01/19/23, 08:43 CST

## 2023-02-17 ENCOUNTER — TELEPHONE (OUTPATIENT)
Dept: INTERNAL MEDICINE | Facility: CLINIC | Age: 68
End: 2023-02-17
Payer: MEDICARE

## 2023-02-17 DIAGNOSIS — R07.89 CHEST WALL PAIN: ICD-10-CM

## 2023-02-17 RX ORDER — TRAMADOL HYDROCHLORIDE 50 MG/1
50 TABLET ORAL EVERY 8 HOURS PRN
Qty: 30 TABLET | Refills: 0 | Status: SHIPPED | OUTPATIENT
Start: 2023-02-17 | End: 2023-03-13 | Stop reason: SDUPTHER

## 2023-02-17 NOTE — TELEPHONE ENCOUNTER
Rx Refill Note  Requested Prescriptions     Pending Prescriptions Disp Refills   • traMADol (ULTRAM) 50 MG tablet 30 tablet 0     Sig: Take 1 tablet by mouth Every 8 (Eight) Hours As Needed for Moderate Pain.      Last office visit with prescribing clinician: 12/16/2022   Last telemedicine visit with prescribing clinician: 3/17/2023   Next office visit with prescribing clinician: 3/17/2023                         Would you like a call back once the refill request has been completed: [] Yes [] No    If the office needs to give you a call back, can they leave a voicemail: [] Yes [] No    Zakia Yee MA  02/17/23, 10:45 CST

## 2023-02-17 NOTE — TELEPHONE ENCOUNTER
Caller: Min Karen L    Relationship: Self    Best call back number: [phone]    Requested Prescriptions:   Requested Prescriptions     Pending Prescriptions Disp Refills   • traMADol (ULTRAM) 50 MG tablet 30 tablet 0     Sig: Take 1 tablet by mouth Every 8 (Eight) Hours As Needed for Moderate Pain.      Pharmacy where request should be sent:    [facility] [address] [place] [address] - [phone]  - [phone]   [phone]      Additional details provided by patient:  ABOUT 2 DAY SUPPLY LEFT    Does the patient have less than a 3 day supply:  [x] Yes  [] No    Would you like a call back once the refill request has been completed: [x] Yes [] No    If the office needs to give you a call back, can they leave a voicemail: [x] Yes [] No    Dora Rebolledo Rep   02/17/23 10:22 CST

## 2023-03-13 DIAGNOSIS — R07.89 CHEST WALL PAIN: ICD-10-CM

## 2023-03-13 NOTE — TELEPHONE ENCOUNTER
Rx Refill Note  Requested Prescriptions     Pending Prescriptions Disp Refills   • traMADol (ULTRAM) 50 MG tablet 30 tablet 0     Sig: Take 1 tablet by mouth Every 8 (Eight) Hours As Needed for Moderate Pain.   Med last filled:  2/17/23  Last office visit with prescribing clinician: 12/16/2022   Next office visit with prescribing clinician: 3/17/2023     Pain Management Profile (13 Drugs) Urine - Urine, Clean Catch (12/16/2022 14:07)                            Would you like a call back once the refill request has been completed: [] Yes [] No    If the office needs to give you a call back, can they leave a voicemail: [] Yes [] No    Thalia Carreon RN  03/13/23, 14:27 CDT

## 2023-03-13 NOTE — TELEPHONE ENCOUNTER
Caller: Copeland Karen L    Relationship: Self    Best call back number: [phone]    Requested Prescriptions:   Requested Prescriptions     Pending Prescriptions Disp Refills   • traMADol (ULTRAM) 50 MG tablet 30 tablet 0     Sig: Take 1 tablet by mouth Every 8 (Eight) Hours As Needed for Moderate Pain.        Pharmacy where request should be sent: [facility] [address] [place] [address] [phone] [facility] [phone]      Additional details provided by patient: TWO DAYS LEFT    Does the patient have less than a 3 day supply:  [x] Yes  [] No    Would you like a call back once the refill request has been completed: [x] Yes [] No    If the office needs to give you a call back, can they leave a voicemail: [x] Yes [] No    Dora Jacinto Rep   03/13/23 14:20 CDT

## 2023-03-14 ENCOUNTER — HOSPITAL ENCOUNTER (OUTPATIENT)
Dept: BONE DENSITY | Facility: HOSPITAL | Age: 68
Discharge: HOME | End: 2023-03-14
Payer: MEDICARE

## 2023-03-14 ENCOUNTER — HOSPITAL ENCOUNTER (OUTPATIENT)
Dept: MAMMOGRAPHY | Facility: HOSPITAL | Age: 68
Discharge: HOME | End: 2023-03-14
Payer: MEDICARE

## 2023-03-14 DIAGNOSIS — Z78.0 POST-MENOPAUSE: ICD-10-CM

## 2023-03-14 DIAGNOSIS — Z12.31 ENCOUNTER FOR SCREENING MAMMOGRAM FOR MALIGNANT NEOPLASM OF BREAST: ICD-10-CM

## 2023-03-14 PROCEDURE — 77063 BREAST TOMOSYNTHESIS BI: CPT

## 2023-03-14 PROCEDURE — 77080 DXA BONE DENSITY AXIAL: CPT

## 2023-03-14 PROCEDURE — 77067 SCR MAMMO BI INCL CAD: CPT

## 2023-03-14 RX ORDER — TRAMADOL HYDROCHLORIDE 50 MG/1
50 TABLET ORAL EVERY 8 HOURS PRN
Qty: 30 TABLET | Refills: 0 | Status: SHIPPED | OUTPATIENT
Start: 2023-03-14

## 2023-03-17 ENCOUNTER — OFFICE VISIT (OUTPATIENT)
Dept: INTERNAL MEDICINE | Facility: CLINIC | Age: 68
End: 2023-03-17
Payer: MEDICARE

## 2023-03-17 VITALS
SYSTOLIC BLOOD PRESSURE: 114 MMHG | OXYGEN SATURATION: 100 % | DIASTOLIC BLOOD PRESSURE: 73 MMHG | RESPIRATION RATE: 18 BRPM | TEMPERATURE: 98.3 F | WEIGHT: 119 LBS | HEIGHT: 62 IN | HEART RATE: 85 BPM | BODY MASS INDEX: 21.9 KG/M2

## 2023-03-17 DIAGNOSIS — Z79.899 LONG TERM USE OF DRUG: Primary | ICD-10-CM

## 2023-03-17 DIAGNOSIS — E78.1 HIGH TRIGLYCERIDES: ICD-10-CM

## 2023-03-17 DIAGNOSIS — E78.2 MIXED HYPERLIPIDEMIA: ICD-10-CM

## 2023-03-17 PROCEDURE — 99213 OFFICE O/P EST LOW 20 MIN: CPT | Performed by: NURSE PRACTITIONER

## 2023-03-17 NOTE — PROGRESS NOTES
Subjective     Chief Complaint   Patient presents with   • Hyperlipidemia       History of Present Illness    The patient consents to ROLAN recording.    АЛЕКСАНДР COPELAND is a 67-year-old female who presents today for follow-up of hyperlipidemia.    Ms. Copeland reports she has been trying to watch her eating habits. She eats pasta, but does not eat bread. She continues to take Lipitor 40 mg and Lopid 600 mg. Her cholesterol at her last visit showed improvement, but is now elevated again.     Her left hip and back pain are controlled with tramadol 50 MG.She continues to smoke. She is due for her drug screen.    Patient's PMR from outside medical facility reviewed and noted.    Review of Systems   Otherwise complete ROS reviewed and negative except as mentioned in the HPI.    Past Medical History:   Past Medical History:   Diagnosis Date   • Anxiety    • Depression    • Hyperlipidemia    • Liver enzyme elevation    • Sleep apnea      Past Surgical History:No past surgical history on file.  Social History:  reports that she has been smoking cigarettes. She has a 3.75 pack-year smoking history. She has never used smokeless tobacco. She reports current alcohol use of about 10.0 standard drinks per week. She reports current drug use. Drug: Marijuana.    Family History: family history includes ADD / ADHD in her son; Arthritis in her mother and sister; Breast cancer in her sister; Cancer in her mother and sister; Hyperlipidemia in her mother; Lupus in her brother, maternal aunt, and sister.      Allergies:  Allergies   Allergen Reactions   • Amoxicillin Diarrhea, Nausea Only and Other (See Comments)     Stomach cramping and fatigue   • Peanut-Containing Drug Products Shortness Of Breath   • Diclofenac Sodium Unknown - High Severity     Flushing and soa     Medications:  Prior to Admission medications    Medication Sig Start Date End Date Taking? Authorizing Provider   aspirin 81 MG tablet Take 81 mg by mouth Daily.     "Moshe Joshi MD   atorvastatin (LIPITOR) 40 MG tablet Take 1 tablet by mouth once daily 5/9/22   Afua Dey APRN   b complex vitamins capsule Take 1 capsule by mouth Daily.    Moshe Joshi MD   coenzyme Q10 50 MG capsule capsule Take 50 mg by mouth Daily.    Moshe Joshi MD   gemfibrozil (LOPID) 600 MG tablet Take 1 tablet by mouth 2 (Two) Times a Day Before Meals. 10/5/22   Afua Dey APRN   traMADol (ULTRAM) 50 MG tablet Take 1 tablet by mouth Every 8 (Eight) Hours As Needed for Moderate Pain. 3/14/23   Afua Dey APRN       Objective     Vital Signs: /73   Pulse 85   Temp 98.3 °F (36.8 °C)   Resp 18   Ht 157.5 cm (62.01\")   Wt 54 kg (119 lb)   SpO2 100%   BMI 21.76 kg/m²   Physical Exam  Vitals reviewed.   Constitutional:       Appearance: She is well-developed.   HENT:      Head: Normocephalic and atraumatic.   Eyes:      Pupils: Pupils are equal, round, and reactive to light.   Neck:      Vascular: No JVD.   Cardiovascular:      Rate and Rhythm: Normal rate and regular rhythm.   Pulmonary:      Effort: Pulmonary effort is normal.      Breath sounds: Normal breath sounds.   Abdominal:      General: Bowel sounds are normal.      Palpations: Abdomen is soft.   Musculoskeletal:         General: No deformity.      Cervical back: Normal range of motion and neck supple.   Lymphadenopathy:      Cervical: No cervical adenopathy.   Skin:     General: Skin is warm and dry.   Neurological:      Mental Status: She is alert and oriented to person, place, and time.   Psychiatric:         Behavior: Behavior normal.         Thought Content: Thought content normal.         Judgment: Judgment normal.         BMI is within normal parameters. No other follow-up for BMI required.      Results Reviewed:  Glucose   Date Value Ref Range Status   02/08/2022 108 (H) 65 - 99 mg/dL Final   10/21/2019 106 74 - 109 mg/dL Final     BUN   Date Value Ref Range " Status   02/08/2022 10 8 - 27 mg/dL Final   10/21/2019 11 8 - 23 mg/dL Final     Creatinine   Date Value Ref Range Status   02/08/2022 0.60 0.57 - 1.00 mg/dL Final   10/21/2019 0.5 0.5 - 0.9 mg/dL Final     Sodium   Date Value Ref Range Status   02/08/2022 141 134 - 144 mmol/L Final   10/21/2019 141 136 - 145 mmol/L Final     Potassium   Date Value Ref Range Status   02/08/2022 3.9 3.5 - 5.2 mmol/L Final   10/21/2019 3.7 3.5 - 5.0 mmol/L Final     Chloride   Date Value Ref Range Status   02/08/2022 102 96 - 106 mmol/L Final   10/21/2019 102 98 - 111 mmol/L Final     CO2   Date Value Ref Range Status   10/21/2019 20 (L) 22 - 29 mmol/L Final     Total CO2   Date Value Ref Range Status   02/08/2022 22 20 - 29 mmol/L Final     Calcium   Date Value Ref Range Status   02/08/2022 9.7 8.7 - 10.3 mg/dL Final   10/21/2019 9.5 8.8 - 10.2 mg/dL Final     ALT (SGPT)   Date Value Ref Range Status   11/16/2021 24 0 - 32 IU/L Final   11/19/2019 35 (H) 5 - 33 U/L Final     AST (SGOT)   Date Value Ref Range Status   11/16/2021 19 0 - 40 IU/L Final   11/19/2019 28 5 - 32 U/L Final     WBC   Date Value Ref Range Status   08/03/2021 5.3 3.4 - 10.8 x10E3/uL Final   10/21/2019 6.2 4.8 - 10.8 K/uL Final     Hematocrit   Date Value Ref Range Status   08/03/2021 39.9 34.0 - 46.6 % Final   10/21/2019 45.7 37.0 - 47.0 % Final     Platelets   Date Value Ref Range Status   08/03/2021 300 150 - 450 x10E3/uL Final   10/21/2019 250 130 - 400 K/uL Final     Triglycerides   Date Value Ref Range Status   12/16/2022 152 (H) 0 - 149 mg/dL Final   10/21/2019 524 (H) 0 - 149 mg/dL Final     HDL Cholesterol   Date Value Ref Range Status   12/16/2022 75 >39 mg/dL Final   10/21/2019 75 65 - 121 mg/dL Final     Comment:     VALUES>60 MG/DL ARE ASSOCIATED WITH A DECREASED RISK OF  ATHEROSCLEROTIC CARDIOVASCULAR DISEASE     LDL Cholesterol    Date Value Ref Range Status   10/21/2019 see below <100 mg/dL Final     Comment:     When the triglyceride is >400 mg/dL  the calculated LDL and  VLDL are not valid.  <100 MG/DL=OPITIMAL    100-129 MG/DL=DESIRABLE    130-159 MG/DL BORDERLINE=INCREASED RISK OF ATHEROSCLEROTIC  CARDIOVASCULAR DISEASE    > OR = 160 MG/DL=ASSOCIATED WITH AN INCREASE RISK OF  ATHEROSCLEROTIC CARDIOVASCULAR DISEASE   10/21/2019 100 <100 mg/dL Final     Comment:     <100 MG/DL=OPITIMAL    100-129 MG/DL=DESIRABLE    130-159 MG/DL BORDERLINE=INCREASED RISK OF ATHEROSCLEROTIC  CARDIOVASCULAR DISEASE    > OR = 160 MG/DL=ASSOCIATED WITH AN INCREASE RISK OF  ATHEROSCLEROTIC CARDIOVASCULAR DISEASE     LDL Chol Calc (NIH)   Date Value Ref Range Status   12/16/2022 108 (H) 0 - 99 mg/dL Final     Hemoglobin A1C   Date Value Ref Range Status   12/03/2021 5.1 % Final         Assessment / Plan     Assessment/Plan:  Diagnoses and all orders for this visit:    1. Long term use of drug (Primary)  Comments:  She is due for a drug screen.  Orders:  -     ToxASSU[facility] Select 13 (MW) - Urine, Clean Catch    2. High triglycerides  -     Lipid Panel    3. Mixed hyperlipidemia  Comments:  - Lipid Panel; Future  Urinalysis With Microscopic If Indicated (No Culture)   TSH; Future  Lipid Panel; Future    Orders:  -     Lipid Panel      Return in about 3 months (around 6/17/2023). unless patient needs to be seen sooner or acute issues arise.    Code Status: Full.     I have discussed the patient results/orders and and plan/recommendation with them at today's visit.      COLLEEN Patton   03/17/2023    Transcribed from ambient dictation for COLLEEN Patton by Dee Sommer.  03/17/23   12:14 CDT    Patient or patient representative verbalized consent to the visit recording.  I have personally performed the services described in this document as transcribed by the above individual, and it is both accurate and complete.  COLLEEN Patton  3/17/2023  15:24 CDT

## 2023-03-18 LAB
CHOLEST SERPL-MCNC: 178 MG/DL (ref 100–199)
HDLC SERPL-MCNC: 51 MG/DL
LDLC SERPL CALC-MCNC: 79 MG/DL (ref 0–99)
TRIGL SERPL-MCNC: 299 MG/DL (ref 0–149)
VLDLC SERPL CALC-MCNC: 48 MG/DL (ref 5–40)

## 2023-03-23 LAB — DRUGS UR: NORMAL

## 2023-04-12 ENCOUNTER — TELEPHONE (OUTPATIENT)
Dept: INTERNAL MEDICINE | Facility: CLINIC | Age: 68
End: 2023-04-12
Payer: MEDICARE

## 2023-04-12 DIAGNOSIS — R07.89 CHEST WALL PAIN: ICD-10-CM

## 2023-04-12 NOTE — TELEPHONE ENCOUNTER
Caller: Alexandra Copelandvenice ALCANTARA    Relationship: Self    Best call back number: [phone]    Requested Prescriptions:   Requested Prescriptions     Pending Prescriptions Disp Refills   • traMADol (ULTRAM) 50 MG tablet 30 tablet 0     Sig: Take 1 tablet by mouth Every 8 (Eight) Hours As Needed for Moderate Pain.        Pharmacy where request should be sent:  [facility]  Last office visit with prescribing clinician: 3/17/2023   Last telemedicine visit with prescribing clinician: 9/22/2023   Next office visit with prescribing clinician: 9/22/2023     Additional details provided by patient: NONE    Does the patient have less than a 3 day supply:  [x] Yes  [] No    Would you like a call back once the refill request has been completed: [x] Yes [] No    If the office needs to give you a call back, can they leave a voicemail: [x] Yes [] No    Dora Gregg Rep   04/12/23 08:54 CDT

## 2023-04-12 NOTE — TELEPHONE ENCOUNTER
Rx Refill Note  Requested Prescriptions     Pending Prescriptions Disp Refills   • traMADol (ULTRAM) 50 MG tablet 30 tablet 0     Sig: Take 1 tablet by mouth Every 8 (Eight) Hours As Needed for Moderate Pain.      Last office visit with prescribing clinician: 3/17/2023   Last telemedicine visit with prescribing clinician: 9/22/2023   Next office visit with prescribing clinician: 9/22/2023                         Would you like a call back once the refill request has been completed: [] Yes [] No    If the office needs to give you a call back, can they leave a voicemail: [] Yes [] No    Zakia Yee MA  04/12/23, 09:08 CDT

## 2023-04-13 RX ORDER — TRAMADOL HYDROCHLORIDE 50 MG/1
50 TABLET ORAL EVERY 8 HOURS PRN
Qty: 30 TABLET | Refills: 0 | Status: SHIPPED | OUTPATIENT
Start: 2023-04-13

## 2023-05-10 DIAGNOSIS — R07.89 CHEST WALL PAIN: ICD-10-CM

## 2023-05-10 NOTE — TELEPHONE ENCOUNTER
Rx Refill Note  Requested Prescriptions     Pending Prescriptions Disp Refills   • traMADol (ULTRAM) 50 MG tablet 30 tablet 0     Sig: Take 1 tablet by mouth Every 8 (Eight) Hours As Needed for Moderate Pain.   Med last filled: 4/13/23   Last office visit with prescribing clinician: 3/17/2023   Next office visit with prescribing clinician: 9/22/2023       ToxASSURE Select 13 (MW) - Urine, Clean Catch (03/17/2023 13:02)                            Would you like a call back once the refill request has been completed: [] Yes [] No    If the office needs to give you a call back, can they leave a voicemail: [] Yes [] No    Thalia Carreon RN  05/10/23, 12:16 CDT

## 2023-05-10 NOTE — TELEPHONE ENCOUNTER
Caller: Min Karen L    Relationship: Self    Best call back number: [phone]    Requested Prescriptions:   Requested Prescriptions     Pending Prescriptions Disp Refills   • traMADol (ULTRAM) 50 MG tablet 30 tablet 0     Sig: Take 1 tablet by mouth Every 8 (Eight) Hours As Needed for Moderate Pain.        Pharmacy where request should be sent: [facility] [address] [place] [address] [phone] [facility] [phone]      Last office visit with prescribing clinician: 3/17/2023   Last telemedicine visit with prescribing clinician: 4/12/2023   Next office visit with prescribing clinician: 9/22/2023     Additional details provided by patient: PATIENT NEEDS NEW PRESCRIPTION WRITTEN PER HER PHARMACY.    Does the patient have less than a 3 day supply:  [x] Yes  [] No    Would you like a call back once the refill request has been completed: [x] Yes [] No    If the office needs to give you a call back, can they leave a voicemail: [x] Yes [] No    Dora Esparza Rep   05/10/23 12:01 CDT

## 2023-05-12 RX ORDER — TRAMADOL HYDROCHLORIDE 50 MG/1
50 TABLET ORAL EVERY 8 HOURS PRN
Qty: 30 TABLET | Refills: 0 | Status: SHIPPED | OUTPATIENT
Start: 2023-05-12

## 2023-06-04 DIAGNOSIS — E78.00 HIGH CHOLESTEROL: ICD-10-CM

## 2023-06-05 RX ORDER — ATORVASTATIN CALCIUM 40 MG/1
TABLET, FILM COATED ORAL
Qty: 90 TABLET | Refills: 0 | Status: SHIPPED | OUTPATIENT
Start: 2023-06-05

## 2023-06-05 NOTE — TELEPHONE ENCOUNTER
Rx Refill Note  Requested Prescriptions     Pending Prescriptions Disp Refills    atorvastatin (LIPITOR) 40 MG tablet [Pharmacy Med Name: Atorvastatin Calcium 40 MG Oral Tablet] 90 tablet 0     Sig: Take 1 tablet by mouth once daily      Last office visit with prescribing clinician: 3/17/2023   Next office visit with prescribing clinician: 9/22/2023                         Would you like a call back once the refill request has been completed: [] Yes [] No    If the office needs to give you a call back, can they leave a voicemail: [] Yes [] No    Thalia Carreon RN  06/05/23, 07:59 CDT

## 2023-06-07 DIAGNOSIS — R07.89 CHEST WALL PAIN: ICD-10-CM

## 2023-06-07 NOTE — TELEPHONE ENCOUNTER
Caller: Copeland Karen L    Relationship: Self    Best call back number:  [phone]     Requested Prescriptions:   Requested Prescriptions     Pending Prescriptions Disp Refills    traMADol (ULTRAM) 50 MG tablet 30 tablet 0     Sig: Take 1 tablet by mouth Every 8 (Eight) Hours As Needed for Moderate Pain.        Pharmacy where request should be sent:      Last office visit with prescribing clinician: 3/17/2023   Last telemedicine visit with prescribing clinician: Visit date not found   Next office visit with prescribing clinician: 9/22/2023     Additional details provided by patient:    Does the patient have less than a 3 day supply:  [x] Yes  [] No    Would you like a call back once the refill request has been completed: [] Yes [x] No    If the office needs to give you a call back, can they leave a voicemail: [] Yes [x] No    Dora Gregg Rep   06/07/23 09:34 CDT

## 2023-06-07 NOTE — TELEPHONE ENCOUNTER
Rx Refill Note  Requested Prescriptions     Pending Prescriptions Disp Refills    traMADol (ULTRAM) 50 MG tablet 30 tablet 0     Sig: Take 1 tablet by mouth Every 8 (Eight) Hours As Needed for Moderate Pain.      Last office visit with prescribing clinician: 3/17/2023   Last telemedicine visit with prescribing clinician: Visit date not found   Next office visit with prescribing clinician: 9/22/2023                         Would you like a call back once the refill request has been completed: [] Yes [] No    If the office needs to give you a call back, can they leave a voicemail: [] Yes [] No    Zakia Yee MA  06/07/23, 09:36 CDT

## 2023-06-08 RX ORDER — TRAMADOL HYDROCHLORIDE 50 MG/1
50 TABLET ORAL EVERY 8 HOURS PRN
Qty: 30 TABLET | Refills: 0 | Status: SHIPPED | OUTPATIENT
Start: 2023-06-08

## 2023-08-02 DIAGNOSIS — R07.89 CHEST WALL PAIN: ICD-10-CM

## 2023-08-04 ENCOUNTER — LAB (OUTPATIENT)
Dept: INTERNAL MEDICINE | Facility: CLINIC | Age: 68
End: 2023-08-04
Payer: MEDICARE

## 2023-08-04 DIAGNOSIS — Z79.899 ENCOUNTER FOR LONG-TERM (CURRENT) USE OF OTHER MEDICATIONS: Primary | ICD-10-CM

## 2023-08-04 RX ORDER — TRAMADOL HYDROCHLORIDE 50 MG/1
50 TABLET ORAL EVERY 8 HOURS PRN
Qty: 30 TABLET | Refills: 0 | Status: SHIPPED | OUTPATIENT
Start: 2023-08-04

## 2023-08-05 LAB
AMPHETAMINES UR QL SCN: NEGATIVE NG/ML
BARBITURATES UR QL SCN: NEGATIVE NG/ML
BENZODIAZ UR QL SCN: NEGATIVE NG/ML
BZE UR QL SCN: NEGATIVE NG/ML
CANNABINOIDS UR QL SCN: POSITIVE NG/ML
CREAT UR-MCNC: 30.1 MG/DL (ref 20–300)
LABORATORY COMMENT REPORT: ABNORMAL
METHADONE UR QL SCN: NEGATIVE NG/ML
OPIATES UR QL SCN: NEGATIVE NG/ML
OXYCODONE+OXYMORPHONE UR QL SCN: NEGATIVE NG/ML
PCP UR QL: NEGATIVE NG/ML
PH UR: 7.5 [PH] (ref 4.5–8.9)
PROPOXYPH UR QL SCN: NEGATIVE NG/ML

## 2023-08-11 ENCOUNTER — TELEPHONE (OUTPATIENT)
Dept: INTERNAL MEDICINE | Facility: CLINIC | Age: 68
End: 2023-08-11
Payer: MEDICARE

## 2023-08-11 NOTE — TELEPHONE ENCOUNTER
Patient was afraid to take paper prescription to pharmacy from ER visit on Wednesday thinking she would have to pay more for them. I advised the pharmacy would bill her insurance the same as if we had prescribed it. She voiced understanding and had no further questions.

## 2023-08-21 ENCOUNTER — OFFICE VISIT (OUTPATIENT)
Dept: INTERNAL MEDICINE | Facility: CLINIC | Age: 68
End: 2023-08-21
Payer: MEDICARE

## 2023-08-21 VITALS
TEMPERATURE: 96.9 F | WEIGHT: 118.25 LBS | HEIGHT: 62 IN | SYSTOLIC BLOOD PRESSURE: 138 MMHG | HEART RATE: 90 BPM | BODY MASS INDEX: 21.76 KG/M2 | OXYGEN SATURATION: 99 % | DIASTOLIC BLOOD PRESSURE: 83 MMHG

## 2023-08-21 DIAGNOSIS — K52.9 ENTEROCOLITIS: ICD-10-CM

## 2023-08-21 DIAGNOSIS — G89.29 CHRONIC LEFT SHOULDER PAIN: Primary | ICD-10-CM

## 2023-08-21 DIAGNOSIS — M25.512 CHRONIC LEFT SHOULDER PAIN: Primary | ICD-10-CM

## 2023-08-21 RX ORDER — LEVOFLOXACIN 500 MG/1
1 TABLET, FILM COATED ORAL DAILY
COMMUNITY
Start: 2023-08-13

## 2023-08-21 RX ORDER — FAMOTIDINE 20 MG/1
1 TABLET, FILM COATED ORAL DAILY
COMMUNITY
Start: 2023-08-13

## 2023-08-21 RX ORDER — METRONIDAZOLE 500 MG/1
1 TABLET ORAL 3 TIMES DAILY
COMMUNITY
Start: 2023-08-13

## 2023-08-21 NOTE — PROGRESS NOTES
Subjective     Chief Complaint   Patient presents with    Hospital Follow Up Visit     Bacterial infection       History of Present Illness  Karen Copeland is a 68-year-old female who presents today for a hospital follow-up.    Ms. Karen Copeland states she had a bacterial infection in her colon. She was hospitalized from 08/09/2023 to 08/11/2023. She had vomiting and diarrhea. She was found to have enterocolitis, esophagitis, and duodenitis. She states her throat was evaluated by a provider with whom she has an appointment with on 08/30/2023. She reports not remembering much of her hospital stay. She believed she was having a myocardial infarction at the time of her symptoms. She reports consuming vodka prior to her hospital visit. Her white count while hospitalized was 16,000 mg/dL. Her potassium was slightly low. She is currently still on an antibiotic 3 times a day.    She confirms previously having severe abdominal pain. She states her abdominal pain has resolved. She reports black stool. She denies taking Pepto-Bismol, bismuth, or iron. She reports taking Pepcid.    The patient states she is feeling good, but her left shoulder pain is worsening. She states the pain radiates to her back. The patient states she has had bursitis and inflammation in the past. She denies pain on palpation of her shoulder. She states the pain is inside her shoulder. She reports pain upon raising her left arm. She reports some tenderness.    Patient's PMR from outside medical facility reviewed and noted.    Review of Systems   Otherwise complete ROS reviewed and negative except as mentioned in the HPI.    Past Medical History:   Past Medical History:   Diagnosis Date    Anxiety     Depression     Hyperlipidemia     Liver enzyme elevation     Sleep apnea      Past Surgical History:History reviewed. No pertinent surgical history.  Social History:  reports that she has been smoking cigarettes. She has a 3.75 pack-year smoking  history. She has never used smokeless tobacco. She reports current alcohol use of about 10.0 standard drinks per week. She reports current drug use. Drug: Marijuana.    Family History: family history includes ADD / ADHD in her son; Arthritis in her mother and sister; Breast cancer in her sister; Cancer in her mother and sister; Hyperlipidemia in her mother; Lupus in her brother, maternal aunt, and sister.       Allergies:  Allergies   Allergen Reactions    Amoxicillin Diarrhea, Nausea Only and Other (See Comments)     Stomach cramping and fatigue    Peanut-Containing Drug Products Shortness Of Breath    Diclofenac Sodium Unknown - High Severity     Flushing and soa     Medications:  Prior to Admission medications    Medication Sig Start Date End Date Taking? Authorizing Provider   aspirin 81 MG tablet Take 1 tablet by mouth Daily.   Yes Moshe Joshi MD   atorvastatin (LIPITOR) 40 MG tablet Take 1 tablet by mouth once daily 6/5/23  Yes Afua Dey APRN   b complex vitamins capsule Take 1 capsule by mouth Daily.   Yes Moshe Joshi MD   coenzyme Q10 50 MG capsule capsule Take 1 capsule by mouth Daily.   Yes Moshe Joshi MD   famotidine (PEPCID) 20 MG tablet Take 1 tablet by mouth Daily. 8/13/23  Yes Moshe Joshi MD   gemfibrozil (LOPID) 600 MG tablet Take 1 tablet by mouth 2 (Two) Times a Day Before Meals. 10/5/22  Yes Afua Dey APRN   levoFLOXacin (LEVAQUIN) 500 MG tablet Take 1 tablet by mouth Daily. 8/13/23  Yes Moshe Joshi MD   metroNIDAZOLE (FLAGYL) 500 MG tablet Take 1 tablet by mouth 3 (Three) Times a Day. 8/13/23  Yes Moshe Joshi MD   traMADol (ULTRAM) 50 MG tablet Take 1 tablet by mouth Every 8 (Eight) Hours As Needed for Moderate Pain. 8/4/23  Yes Afua Dey APRN       Objective     Vital Signs: /83 (BP Location: Right arm, Patient Position: Sitting, Cuff Size: Adult)   Pulse 90   Temp 96.9 øF (36.1 øC)  "(Skin)   Ht 157.5 cm (62.01\")   Wt 53.6 kg (118 lb 4 oz)   SpO2 99%   BMI 21.62 kg/mý   Physical Exam  Vitals reviewed.   Constitutional:       Appearance: She is well-developed.   HENT:      Head: Normocephalic and atraumatic.   Eyes:      Pupils: Pupils are equal, round, and reactive to light.   Neck:      Vascular: No JVD.   Cardiovascular:      Rate and Rhythm: Normal rate and regular rhythm.   Pulmonary:      Effort: Pulmonary effort is normal.   Abdominal:      General: Bowel sounds are normal.      Palpations: Abdomen is soft.   Musculoskeletal:         General: No deformity.      Cervical back: Normal range of motion and neck supple.      Comments: Unable to performa lateral raise.    Lymphadenopathy:      Cervical: No cervical adenopathy.   Skin:     General: Skin is warm and dry.   Neurological:      Mental Status: She is alert and oriented to person, place, and time.   Psychiatric:         Behavior: Behavior normal.         Thought Content: Thought content normal.         Judgment: Judgment normal.     BMI is within normal parameters. No other follow-up for BMI required.    Results Reviewed:  Glucose   Date Value Ref Range Status   02/08/2022 108 (H) 65 - 99 mg/dL Final   10/21/2019 106 74 - 109 mg/dL Final     BUN   Date Value Ref Range Status   02/08/2022 10 8 - 27 mg/dL Final   10/21/2019 11 8 - 23 mg/dL Final     Creatinine   Date Value Ref Range Status   02/08/2022 0.60 0.57 - 1.00 mg/dL Final   10/21/2019 0.5 0.5 - 0.9 mg/dL Final     Sodium   Date Value Ref Range Status   02/08/2022 141 134 - 144 mmol/L Final   10/21/2019 141 136 - 145 mmol/L Final     Potassium   Date Value Ref Range Status   02/08/2022 3.9 3.5 - 5.2 mmol/L Final   10/21/2019 3.7 3.5 - 5.0 mmol/L Final     Chloride   Date Value Ref Range Status   02/08/2022 102 96 - 106 mmol/L Final   10/21/2019 102 98 - 111 mmol/L Final     CO2   Date Value Ref Range Status   10/21/2019 20 (L) 22 - 29 mmol/L Final     Total CO2   Date Value " Ref Range Status   02/08/2022 22 20 - 29 mmol/L Final     Calcium   Date Value Ref Range Status   02/08/2022 9.7 8.7 - 10.3 mg/dL Final   10/21/2019 9.5 8.8 - 10.2 mg/dL Final     ALT (SGPT)   Date Value Ref Range Status   11/16/2021 24 0 - 32 IU/L Final   11/19/2019 35 (H) 5 - 33 U/L Final     AST (SGOT)   Date Value Ref Range Status   11/16/2021 19 0 - 40 IU/L Final   11/19/2019 28 5 - 32 U/L Final     WBC   Date Value Ref Range Status   08/03/2021 5.3 3.4 - 10.8 x10E3/uL Final   10/21/2019 6.2 4.8 - 10.8 K/uL Final     Hematocrit   Date Value Ref Range Status   08/03/2021 39.9 34.0 - 46.6 % Final   10/21/2019 45.7 37.0 - 47.0 % Final     Platelets   Date Value Ref Range Status   08/03/2021 300 150 - 450 x10E3/uL Final   10/21/2019 250 130 - 400 K/uL Final     Triglycerides   Date Value Ref Range Status   03/17/2023 299 (H) 0 - 149 mg/dL Final   10/21/2019 524 (H) 0 - 149 mg/dL Final     HDL Cholesterol   Date Value Ref Range Status   03/17/2023 51 >39 mg/dL Final   10/21/2019 75 65 - 121 mg/dL Final     Comment:     VALUES>60 MG/DL ARE ASSOCIATED WITH A DECREASED RISK OF  ATHEROSCLEROTIC CARDIOVASCULAR DISEASE     LDL Cholesterol    Date Value Ref Range Status   10/21/2019 see below <100 mg/dL Final     Comment:     When the triglyceride is >400 mg/dL the calculated LDL and  VLDL are not valid.  <100 MG/DL=OPITIMAL    100-129 MG/DL=DESIRABLE    130-159 MG/DL BORDERLINE=INCREASED RISK OF ATHEROSCLEROTIC  CARDIOVASCULAR DISEASE    > OR = 160 MG/DL=ASSOCIATED WITH AN INCREASE RISK OF  ATHEROSCLEROTIC CARDIOVASCULAR DISEASE   10/21/2019 100 <100 mg/dL Final     Comment:     <100 MG/DL=OPITIMAL    100-129 MG/DL=DESIRABLE    130-159 MG/DL BORDERLINE=INCREASED RISK OF ATHEROSCLEROTIC  CARDIOVASCULAR DISEASE    > OR = 160 MG/DL=ASSOCIATED WITH AN INCREASE RISK OF  ATHEROSCLEROTIC CARDIOVASCULAR DISEASE     LDL Chol Calc (NIH)   Date Value Ref Range Status   03/17/2023 79 0 - 99 mg/dL Final     Hemoglobin A1C   Date Value  Ref Range Status   12/03/2021 5.1 % Final         Assessment / Plan     Assessment/Plan:  Diagnoses and all orders for this visit:    1. Chronic left shoulder pain (Primary)  Comments:  X-ray of the left shoulder ordered. We will refer to orthopedics.  Orders:  -     XR Shoulder 2+ View Left (In Office)    2. Enterocolitis   Improving with current antibiotics.     No follow-ups on file. unless patient needs to be seen sooner or acute issues arise.    Code Status: Full.     I have discussed the patient results/orders and and plan/recommendation with them at today's visit.      Transcribed from ambient dictation for COLLEEN Patton by Berhane Van.  08/21/23   11:47 CDT    Patient or patient representative verbalized consent to the visit recording.  I have personally performed the services described in this document as transcribed by the above individual, and it is both accurate and complete.  Afua Dey, COLLEEN  8/27/2023  16:38 CDT    COLLEEN Patton   08/21/2023

## 2023-08-22 ENCOUNTER — TELEPHONE (OUTPATIENT)
Dept: INTERNAL MEDICINE | Facility: CLINIC | Age: 68
End: 2023-08-22
Payer: MEDICARE

## 2023-08-24 DIAGNOSIS — E78.00 HIGH CHOLESTEROL: ICD-10-CM

## 2023-08-25 ENCOUNTER — TELEPHONE (OUTPATIENT)
Dept: INTERNAL MEDICINE | Facility: CLINIC | Age: 68
End: 2023-08-25

## 2023-08-25 DIAGNOSIS — M25.512 ACUTE PAIN OF LEFT SHOULDER: Primary | ICD-10-CM

## 2023-08-25 RX ORDER — ATORVASTATIN CALCIUM 40 MG/1
TABLET, FILM COATED ORAL
Qty: 90 TABLET | Refills: 0 | Status: SHIPPED | OUTPATIENT
Start: 2023-08-25

## 2023-08-25 NOTE — TELEPHONE ENCOUNTER
Rx Refill Note  Requested Prescriptions     Pending Prescriptions Disp Refills    atorvastatin (LIPITOR) 40 MG tablet [Pharmacy Med Name: Atorvastatin Calcium 40 MG Oral Tablet] 90 tablet 0     Sig: Take 1 tablet by mouth once daily      Last office visit with prescribing clinician: 8/21/2023   Last telemedicine visit with prescribing clinician: Visit date not found   Next office visit with prescribing clinician: 9/26/2023                         Would you like a call back once the refill request has been completed: [] Yes [] No    If the office needs to give you a call back, can they leave a voicemail: [] Yes [] No    Zakia Yee MA  08/25/23, 08:06 CDT

## 2023-08-25 NOTE — TELEPHONE ENCOUNTER
[facility] staff attempted to follow warm transfer process and was unsuccessful     Caller: Karen Copeland    Relationship to patient: Self    Best call back number:     [phone]        Patient is needing: SHE IS SEEING ORTHO MONDAY THE 28TH AT 1PM, THEY TOLD HER SHE NEEDED TO BRING 'XRAYS'-SHE CAN COME GET MONDAY MORNING.  PLEASE ADVISE.

## 2023-08-28 NOTE — TELEPHONE ENCOUNTER
Pt called asking if we had the Xray ready for her to take to [facility] for an appt this afternoon.  I called [facility] and they will have it ready for patient to  today.

## 2023-09-12 ENCOUNTER — TELEPHONE (OUTPATIENT)
Dept: INTERNAL MEDICINE | Facility: CLINIC | Age: 68
End: 2023-09-12
Payer: MEDICARE

## 2023-09-12 NOTE — TELEPHONE ENCOUNTER
Caller: Karen Copeland    Relationship: Self    Best call back number: [phone] (Home)     What is the best time to reach you: Any    Who are you requesting to speak with (clinical staff, provider,  specific staff member): Clinical    What was the call regarding: The patient states that she ran out of the Tramadol on 9/8/23 and states that she would like an update on the refill request.

## 2023-09-14 DIAGNOSIS — R07.89 CHEST WALL PAIN: ICD-10-CM

## 2023-09-14 RX ORDER — TRAMADOL HYDROCHLORIDE 50 MG/1
TABLET ORAL
Qty: 30 TABLET | Refills: 0 | Status: SHIPPED | OUTPATIENT
Start: 2023-09-14

## 2023-09-14 NOTE — TELEPHONE ENCOUNTER
Rx Refill Note  Requested Prescriptions     Pending Prescriptions Disp Refills    traMADol (ULTRAM) 50 MG tablet [Pharmacy Med Name: traMADol HCl 50 MG Oral Tablet] 30 tablet 0     Sig: TAKE 1 TABLET BY MOUTH EVERY 8 HOURS AS NEEDED FOR MODERATE PAIN      Last office visit with office: 8-21-23  Next office visit with office: 9-    UDS: Urine Drug Screen - Urine, Clean Catch (08/03/2023 23:00)     DATE OF LAST REFILL: 8-4-23      Controlled Substance Agreement: up to date               Abby Mcrae CMA  09/14/23, 15:08 CDT

## 2023-09-26 ENCOUNTER — OFFICE VISIT (OUTPATIENT)
Dept: INTERNAL MEDICINE | Facility: CLINIC | Age: 68
End: 2023-09-26
Payer: MEDICARE

## 2023-09-26 VITALS
HEART RATE: 86 BPM | RESPIRATION RATE: 17 BRPM | OXYGEN SATURATION: 99 % | SYSTOLIC BLOOD PRESSURE: 139 MMHG | TEMPERATURE: 97.6 F | WEIGHT: 114 LBS | BODY MASS INDEX: 20.98 KG/M2 | HEIGHT: 62 IN | DIASTOLIC BLOOD PRESSURE: 83 MMHG

## 2023-09-26 DIAGNOSIS — R21 RASH AND NONSPECIFIC SKIN ERUPTION: ICD-10-CM

## 2023-09-26 DIAGNOSIS — E78.2 MIXED HYPERLIPIDEMIA: ICD-10-CM

## 2023-09-26 DIAGNOSIS — R07.89 CHEST WALL PAIN: ICD-10-CM

## 2023-09-26 DIAGNOSIS — Z00.00 MEDICARE ANNUAL WELLNESS VISIT, SUBSEQUENT: Primary | ICD-10-CM

## 2023-09-26 PROBLEM — K29.70 GASTRITIS: Status: ACTIVE | Noted: 2023-09-26

## 2023-09-26 PROCEDURE — 1170F FXNL STATUS ASSESSED: CPT | Performed by: NURSE PRACTITIONER

## 2023-09-26 PROCEDURE — 1159F MED LIST DOCD IN RCRD: CPT | Performed by: NURSE PRACTITIONER

## 2023-09-26 PROCEDURE — G0439 PPPS, SUBSEQ VISIT: HCPCS | Performed by: NURSE PRACTITIONER

## 2023-09-26 PROCEDURE — 1160F RVW MEDS BY RX/DR IN RCRD: CPT | Performed by: NURSE PRACTITIONER

## 2023-09-26 RX ORDER — TRIAMCINOLONE ACETONIDE 1 MG/G
1 OINTMENT TOPICAL 2 TIMES DAILY
Qty: 30 G | Refills: 0 | Status: SHIPPED | OUTPATIENT
Start: 2023-09-26

## 2023-09-26 NOTE — PROGRESS NOTES
The ABCs of the Annual Wellness Visit  Subsequent Medicare Wellness Visit    Subjective    Karen Copeland is a 68 y.o. female who presents for a Subsequent Medicare Wellness Visit.    The following portions of the patient's history were reviewed and   updated as appropriate: She  has a past medical history of Anxiety, Depression, Hyperlipidemia, Liver enzyme elevation, and Sleep apnea.  She does not have any pertinent problems on file.  She  has no past surgical history on file.  Her family history includes ADD / ADHD in her son; Arthritis in her mother and sister; Breast cancer in her sister; Cancer in her mother and sister; Hyperlipidemia in her mother; Lupus in her brother, maternal aunt, and sister.  She  reports that she has been smoking cigarettes. She has a 3.75 pack-year smoking history. She has never used smokeless tobacco. She reports current alcohol use of about 10.0 standard drinks per week. She reports current drug use. Drug: Marijuana.  Current Outpatient Medications   Medication Sig Dispense Refill    aspirin 81 MG tablet Take 1 tablet by mouth Daily.      atorvastatin (LIPITOR) 40 MG tablet Take 1 tablet by mouth once daily 90 tablet 0    b complex vitamins capsule Take 1 capsule by mouth Daily.      coenzyme Q10 50 MG capsule capsule Take 1 capsule by mouth Daily.      famotidine (PEPCID) 20 MG tablet Take 1 tablet by mouth Daily.      gemfibrozil (LOPID) 600 MG tablet Take 1 tablet by mouth 2 (Two) Times a Day Before Meals. 60 tablet 6    levoFLOXacin (LEVAQUIN) 500 MG tablet Take 1 tablet by mouth Daily.      metroNIDAZOLE (FLAGYL) 500 MG tablet Take 1 tablet by mouth 3 (Three) Times a Day.      traMADol (ULTRAM) 50 MG tablet TAKE 1 TABLET BY MOUTH EVERY 8 HOURS AS NEEDED FOR MODERATE PAIN 30 tablet 0    triamcinolone (KENALOG) 0.1 % ointment Apply 1 application  topically to the appropriate area as directed 2 (Two) Times a Day. 30 g 0     No current facility-administered medications for this  visit.     Current Outpatient Medications on File Prior to Visit   Medication Sig    aspirin 81 MG tablet Take 1 tablet by mouth Daily.    atorvastatin (LIPITOR) 40 MG tablet Take 1 tablet by mouth once daily    b complex vitamins capsule Take 1 capsule by mouth Daily.    coenzyme Q10 50 MG capsule capsule Take 1 capsule by mouth Daily.    famotidine (PEPCID) 20 MG tablet Take 1 tablet by mouth Daily.    gemfibrozil (LOPID) 600 MG tablet Take 1 tablet by mouth 2 (Two) Times a Day Before Meals.    levoFLOXacin (LEVAQUIN) 500 MG tablet Take 1 tablet by mouth Daily.    metroNIDAZOLE (FLAGYL) 500 MG tablet Take 1 tablet by mouth 3 (Three) Times a Day.    traMADol (ULTRAM) 50 MG tablet TAKE 1 TABLET BY MOUTH EVERY 8 HOURS AS NEEDED FOR MODERATE PAIN     No current facility-administered medications on file prior to visit.     She is allergic to amoxicillin, peanut-containing drug products, and diclofenac sodium..    Compared to one year ago, the patient feels her physical   health is the same.    Compared to one year ago, the patient feels her mental   health is the same.    Recent Hospitalizations:  She was admitted within the past 365 days at [facility] one month ago, patient states she had a bacterial infection in her stomach.       Current Medical Providers:  Patient Care Team:  Afua Dey APRN as PCP - General (Nurse Practitioner)    Outpatient Medications Prior to Visit   Medication Sig Dispense Refill    aspirin 81 MG tablet Take 1 tablet by mouth Daily.      atorvastatin (LIPITOR) 40 MG tablet Take 1 tablet by mouth once daily 90 tablet 0    b complex vitamins capsule Take 1 capsule by mouth Daily.      coenzyme Q10 50 MG capsule capsule Take 1 capsule by mouth Daily.      famotidine (PEPCID) 20 MG tablet Take 1 tablet by mouth Daily.      gemfibrozil (LOPID) 600 MG tablet Take 1 tablet by mouth 2 (Two) Times a Day Before Meals. 60 tablet 6    levoFLOXacin (LEVAQUIN) 500 MG tablet Take  "1 tablet by mouth Daily.      metroNIDAZOLE (FLAGYL) 500 MG tablet Take 1 tablet by mouth 3 (Three) Times a Day.      traMADol (ULTRAM) 50 MG tablet TAKE 1 TABLET BY MOUTH EVERY 8 HOURS AS NEEDED FOR MODERATE PAIN 30 tablet 0     No facility-administered medications prior to visit.       Opioid medication/s are on active medication list.  and I have evaluated her active treatment plan and pain score trends (see table).  There were no vitals filed for this visit.  I have reviewed the chart for potential of high risk medication and harmful drug interactions in the elderly.          Aspirin is on active medication list. Aspirin use is indicated based on review of current medical condition/s. Pros and cons of this therapy have been discussed today. Benefits of this medication outweigh potential harm.  Patient has been encouraged to continue taking this medication.  .      Patient Active Problem List   Diagnosis    Anxiety    Arthritis of shoulder    Chronic cough    Depression    Effusion of sternoclavicular joint    Fatigue    Hip pain, left    Hyperlipidemia    Joint swelling    Spasm    High risk medication use    Acute costochondritis    RUQ pain    Right-sided chest wall pain    Arthralgia    Elevated blood pressure reading without diagnosis of hypertension    Elevated antinuclear antibody (RIVAS) level    Gastritis     Advance Care Planning   Advance Care Planning     Advance Directive is not on file.  ACP discussion was held with the patient during this visit. Patient does not have an advance directive, information provided.     Objective    Vitals:    09/26/23 1025   BP: 139/83   Pulse: 86   Resp: 17   Temp: 97.6 °F (36.4 °C)   SpO2: 99%   Weight: 51.7 kg (114 lb)   Height: 157.5 cm (62.01\")     Estimated body mass index is 20.84 kg/m² as calculated from the following:    Height as of this encounter: 157.5 cm (62.01\").    Weight as of this encounter: 51.7 kg (114 lb).    BMI is within normal parameters. No other " follow-up for BMI required.      Does the patient have evidence of cognitive impairment? No          HEALTH RISK ASSESSMENT    Smoking Status:  Social History     Tobacco Use   Smoking Status Every Day    Packs/day: 0.25    Years: 15.00    Pack years: 3.75    Types: Cigarettes    Last attempt to quit: 2019    Years since quittin.7   Smokeless Tobacco Never   Tobacco Comments    quit smoking     Alcohol Consumption:  Social History     Substance and Sexual Activity   Alcohol Use Yes    Alcohol/week: 10.0 standard drinks    Types: 10 Glasses of wine per week    Comment: Pt stated she drinks about a 5th     Fall Risk Screen:    STEADI Fall Risk Assessment was completed, and patient is at LOW risk for falls.Assessment completed on:2023    Depression Screenin/26/2023    10:23 AM   PHQ-2/PHQ-9 Depression Screening   Little Interest or Pleasure in Doing Things 0-->not at all   Feeling Down, Depressed or Hopeless 0-->not at all   PHQ-9: Brief Depression Severity Measure Score 0       Health Habits and Functional and Cognitive Screenin/26/2023    10:26 AM   Functional & Cognitive Status   Do you have difficulty preparing food and eating? No   Do you have difficulty bathing yourself, getting dressed or grooming yourself? No   Do you have difficulty using the toilet? No   Do you have difficulty moving around from place to place? No   Do you have trouble with steps or getting out of a bed or a chair? No   Current Diet Well Balanced Diet   Dental Exam Not up to date   Eye Exam Up to date   Exercise (times per week) Other   Current Exercises Include Yard Work   Do you need help using the phone?  No   Are you deaf or do you have serious difficulty hearing?  No   Do you need help to go to places out of walking distance? No   Do you need help shopping? No   Do you need help preparing meals?  No   Do you need help with housework?  No   Do you need help with laundry? No   Do you need help taking your  medications? No   Do you need help managing money? No   Do you ever drive or ride in a car without wearing a seat belt? No   Have you felt unusual stress, anger or loneliness in the last month? No   Who do you live with? Spouse   If you need help, do you have trouble finding someone available to you? No   Have you been bothered in the last four weeks by sexual problems? No   Do you have difficulty concentrating, remembering or making decisions? No       Age-appropriate Screening Schedule:  Refer to the list below for future screening recommendations based on patient's age, sex and/or medical conditions. Orders for these recommended tests are listed in the plan section. The patient has been provided with a written plan.    Health Maintenance   Topic Date Due    TDAP/TD VACCINES (1 - Tdap) 06/23/2016    COVID-19 Vaccine (3 - Moderna series) 06/15/2021    ZOSTER VACCINE (1 of 2) 01/02/2024 (Originally 5/28/2005)    LIPID PANEL  03/17/2024    ANNUAL WELLNESS VISIT  09/26/2024    MAMMOGRAM  03/14/2025    DXA SCAN  03/14/2025    COLORECTAL CANCER SCREENING  05/30/2029    HEPATITIS C SCREENING  Completed    Pneumococcal Vaccine 65+  Completed    INFLUENZA VACCINE  Discontinued                  CMS Preventative Services Quick Reference  Risk Factors Identified During Encounter  Chronic Pain: Natural history and expected course discussed. Questions answered.  OTC analgesics as needed. Proper dosing schedule discussed.   The above risks/problems have been discussed with the patient.  Pertinent information has been shared with the patient in the After Visit Summary.  An After Visit Summary and PPPS were made available to the patient.    Follow Up:   Next Medicare Wellness visit to be scheduled in 1 year.       Additional E&M Note during same encounter follows:  Patient has multiple medical problems which are significant and separately identifiable that require additional work above and beyond the Medicare Wellness Visit.   "    Chief Complaint  Medicare Wellness-subsequent    Subjective        HPI  Karen Copeland is also being seen today for a rash, she first noticed it yesterday afternoon. The rash is under her left arm, near her breast. She denies itching to the area at present. Patient denies any trauma to the area. Patient denies using new soaps or detergents. Patient has not used any over the counter medications for the rash. Patient reports having an instance with a rash similar on her neck and chest. Patient denies needing medication refills. Patient denies any new medications. Patient denies any contact with any allergens. Her left shoulder pain has improved. She received a steroid injection at . She never saw pain management. States she was out of Tramadol for 10 days recently and states it was bad.           Objective   Vital Signs:  /83   Pulse 86   Temp 97.6 °F (36.4 °C)   Resp 17   Ht 157.5 cm (62.01\")   Wt 51.7 kg (114 lb)   SpO2 99%   BMI 20.84 kg/m²     Physical Exam  Vitals reviewed.   Constitutional:       Appearance: Normal appearance. She is well-developed.   HENT:      Head: Normocephalic and atraumatic.   Eyes:      Pupils: Pupils are equal, round, and reactive to light.   Neck:      Vascular: No JVD.   Cardiovascular:      Rate and Rhythm: Normal rate and regular rhythm.   Pulmonary:      Effort: Pulmonary effort is normal.   Abdominal:      General: Bowel sounds are normal.      Palpations: Abdomen is soft.   Musculoskeletal:         General: No deformity.      Cervical back: Normal range of motion and neck supple.   Lymphadenopathy:      Cervical: No cervical adenopathy.   Skin:     General: Skin is warm and dry.   Neurological:      Mental Status: She is alert and oriented to person, place, and time.   Psychiatric:         Behavior: Behavior normal.         Thought Content: Thought content normal.         Judgment: Judgment normal.              Assessment and Plan   Diagnoses and all orders for " this visit:    1. Medicare annual wellness visit, subsequent (Primary)  -     CBC & Differential  -     Comprehensive Metabolic Panel    2. Mixed hyperlipidemia  -     Lipid Panel    3. Rash and nonspecific skin eruption  -     triamcinolone (KENALOG) 0.1 % ointment; Apply 1 application  topically to the appropriate area as directed 2 (Two) Times a Day.  Dispense: 30 g; Refill: 0    4. Chest wall pain  Comments:  chronic         Follow Up   Return in about 3 months (around 12/26/2023).  Patient was given instructions and counseling regarding her condition or for health maintenance advice. Please see specific information pulled into the AVS if appropriate.

## 2023-09-27 LAB
ALBUMIN SERPL-MCNC: 4.6 G/DL (ref 3.9–4.9)
ALBUMIN/GLOB SERPL: 2.3 {RATIO} (ref 1.2–2.2)
ALP SERPL-CCNC: 94 IU/L (ref 44–121)
ALT SERPL-CCNC: 28 IU/L (ref 0–32)
AST SERPL-CCNC: 17 IU/L (ref 0–40)
BASOPHILS # BLD AUTO: 0 X10E3/UL (ref 0–0.2)
BASOPHILS NFR BLD AUTO: 1 %
BILIRUB SERPL-MCNC: 0.3 MG/DL (ref 0–1.2)
BUN SERPL-MCNC: 6 MG/DL (ref 8–27)
BUN/CREAT SERPL: 14 (ref 12–28)
CALCIUM SERPL-MCNC: 9.3 MG/DL (ref 8.7–10.3)
CHLORIDE SERPL-SCNC: 104 MMOL/L (ref 96–106)
CHOLEST SERPL-MCNC: 220 MG/DL (ref 100–199)
CO2 SERPL-SCNC: 24 MMOL/L (ref 20–29)
CREAT SERPL-MCNC: 0.42 MG/DL (ref 0.57–1)
EGFRCR SERPLBLD CKD-EPI 2021: 106 ML/MIN/1.73
EOSINOPHIL # BLD AUTO: 0.1 X10E3/UL (ref 0–0.4)
EOSINOPHIL NFR BLD AUTO: 2 %
ERYTHROCYTE [DISTWIDTH] IN BLOOD BY AUTOMATED COUNT: 13 % (ref 11.7–15.4)
GLOBULIN SER CALC-MCNC: 2 G/DL (ref 1.5–4.5)
GLUCOSE SERPL-MCNC: 106 MG/DL (ref 70–99)
HCT VFR BLD AUTO: 37.6 % (ref 34–46.6)
HDLC SERPL-MCNC: 71 MG/DL
HGB BLD-MCNC: 12.5 G/DL (ref 11.1–15.9)
IMM GRANULOCYTES # BLD AUTO: 0 X10E3/UL (ref 0–0.1)
IMM GRANULOCYTES NFR BLD AUTO: 0 %
LDLC SERPL CALC-MCNC: 103 MG/DL (ref 0–99)
LYMPHOCYTES # BLD AUTO: 1.6 X10E3/UL (ref 0.7–3.1)
LYMPHOCYTES NFR BLD AUTO: 38 %
MCH RBC QN AUTO: 31.9 PG (ref 26.6–33)
MCHC RBC AUTO-ENTMCNC: 33.2 G/DL (ref 31.5–35.7)
MCV RBC AUTO: 96 FL (ref 79–97)
MONOCYTES # BLD AUTO: 0.4 X10E3/UL (ref 0.1–0.9)
MONOCYTES NFR BLD AUTO: 9 %
NEUTROPHILS # BLD AUTO: 2.1 X10E3/UL (ref 1.4–7)
NEUTROPHILS NFR BLD AUTO: 50 %
PLATELET # BLD AUTO: 213 X10E3/UL (ref 150–450)
POTASSIUM SERPL-SCNC: 3.6 MMOL/L (ref 3.5–5.2)
PROT SERPL-MCNC: 6.6 G/DL (ref 6–8.5)
RBC # BLD AUTO: 3.92 X10E6/UL (ref 3.77–5.28)
SODIUM SERPL-SCNC: 142 MMOL/L (ref 134–144)
TRIGL SERPL-MCNC: 273 MG/DL (ref 0–149)
VLDLC SERPL CALC-MCNC: 46 MG/DL (ref 5–40)
WBC # BLD AUTO: 4.2 X10E3/UL (ref 3.4–10.8)

## 2023-10-16 DIAGNOSIS — R07.89 CHEST WALL PAIN: ICD-10-CM

## 2023-10-16 RX ORDER — TRAMADOL HYDROCHLORIDE 50 MG/1
50 TABLET ORAL EVERY 8 HOURS PRN
Qty: 30 TABLET | Refills: 0 | Status: SHIPPED | OUTPATIENT
Start: 2023-10-16

## 2023-10-16 NOTE — TELEPHONE ENCOUNTER
Caller: Saadia Copelandn L    Relationship: Self    Best call back number: [phone]     Requested Prescriptions:   Requested Prescriptions     Pending Prescriptions Disp Refills    traMADol (ULTRAM) 50 MG tablet 30 tablet 0     Sig: Take 1 tablet by mouth Every 8 (Eight) Hours As Needed for Moderate Pain.        Pharmacy where request should be sent: [facility] [address] [place] [address] [phone] [facility] [phone] FX     Last office visit with prescribing clinician: 9/26/2023   Last telemedicine visit with prescribing clinician: Visit date not found   Next office visit with prescribing clinician: 12/26/2023     Additional details provided by patient: LESS THEN 3 LEFT    Does the patient have less than a 3 day supply:  [x] Yes  [] No    Would you like a call back once the refill request has been completed: [] Yes [x] No    If the office needs to give you a call back, can they leave a voicemail: [] Yes [x] No    Dora Brush Rep   10/16/23 09:53 CDT

## 2023-10-26 ENCOUNTER — OFFICE VISIT (OUTPATIENT)
Dept: INTERNAL MEDICINE | Facility: CLINIC | Age: 68
End: 2023-10-26
Payer: MEDICARE

## 2023-10-26 VITALS
WEIGHT: 117 LBS | HEIGHT: 62 IN | OXYGEN SATURATION: 98 % | TEMPERATURE: 98.7 F | DIASTOLIC BLOOD PRESSURE: 83 MMHG | HEART RATE: 92 BPM | SYSTOLIC BLOOD PRESSURE: 144 MMHG | RESPIRATION RATE: 16 BRPM | BODY MASS INDEX: 21.53 KG/M2

## 2023-10-26 DIAGNOSIS — R93.89 ABNORMAL FINDING ON IMAGING: ICD-10-CM

## 2023-10-26 DIAGNOSIS — R10.13 EPIGASTRIC PAIN: Primary | ICD-10-CM

## 2023-10-26 DIAGNOSIS — R63.0 ANOREXIA: ICD-10-CM

## 2023-10-26 DIAGNOSIS — R10.11 RUQ PAIN: ICD-10-CM

## 2023-10-26 PROCEDURE — 99214 OFFICE O/P EST MOD 30 MIN: CPT | Performed by: NURSE PRACTITIONER

## 2023-10-26 RX ORDER — PANTOPRAZOLE SODIUM 40 MG/1
40 TABLET, DELAYED RELEASE ORAL DAILY
Qty: 30 TABLET | Refills: 1 | Status: SHIPPED | OUTPATIENT
Start: 2023-10-26

## 2023-10-26 NOTE — PROGRESS NOTES
Subjective     Chief Complaint   Patient presents with    Abdominal Pain     Started Monday, burning sensation, intermittent     Headache       History of Present Illness    Karen Copeland presents to the clinic today for evaluation of epigastric pain.    Ms. Copeland has been experiencing a burning sensation in her epigastric region which began approximately 4 days ago. This has not been occurring constantly. She has noticed a major decrease in her appetite and feels as if she does not want to eat. She reports that nothing has been tasting good. The patient has not previously been made aware of having any ulcers. She denies any vomiting, diarrhea, bloating, or constipation. She denies any increase in flatulence or belching. Her bowel movements have been normal. She only consumed 1 meal on 10/25/2023 which consisted of baked chicken breast, stuffing, and green beans. She has been consuming water. The patient was previously prescribed Pepcid and had some tablets left over, so she did take 1 tablet on 10/24/2023. After taking this, she was able to notice a slight difference. She has also been experiencing some shoulder aches. She has been taking Tylenol for discomfort. The patient describes currently feeling as if she is in a tunnel and states that her ears are ringing. She has not eaten yet today.     Ms. Copeland consumes wine and states that she has been consuming more than she should be. There are times where she will consume 1 bottle of wine within 1 day, but she denies consuming 2 bottles within 1 day. Since her pain began, she has limited her wine intake to the evenings and has not been consuming as much as she typically does. She denies previously undergoing a cholecystectomy.    Patient's PMR from outside medical facility reviewed and noted.    Review of Systems   Constitutional:  Positive for appetite change. Negative for chills, diaphoresis, fatigue, fever and unexpected weight change.   HENT:  Negative  for congestion, ear pain, postnasal drip, sore throat and trouble swallowing.    Eyes:  Negative for pain and visual disturbance.   Respiratory:  Negative for cough, chest tightness, shortness of breath and wheezing.    Cardiovascular:  Negative for chest pain, palpitations and leg swelling.   Gastrointestinal:  Positive for abdominal pain. Negative for blood in stool, constipation, diarrhea, nausea and vomiting.   Endocrine: Negative for cold intolerance, heat intolerance, polydipsia, polyphagia and polyuria.   Genitourinary:  Negative for difficulty urinating, dysuria, frequency and urgency.   Musculoskeletal:  Negative for gait problem.   Skin:  Negative for rash.   Neurological:  Negative for dizziness, seizures, syncope, weakness and headaches.   Hematological:  Does not bruise/bleed easily.   Psychiatric/Behavioral:  Negative for confusion. The patient is not nervous/anxious.         Otherwise complete ROS reviewed and negative except as mentioned in the HPI.    Past Medical History:   Past Medical History:   Diagnosis Date    Anxiety     Depression     Hyperlipidemia     Liver enzyme elevation     Sleep apnea      Past Surgical History:No past surgical history on file.  Social History:  reports that she has been smoking cigarettes. She has a 3.75 pack-year smoking history. She has never used smokeless tobacco. She reports current alcohol use of about 10.0 standard drinks of alcohol per week. She reports current drug use. Drug: Marijuana.    Family History: family history includes ADD / ADHD in her son; Arthritis in her mother and sister; Breast cancer in her sister; Cancer in her mother and sister; Hyperlipidemia in her mother; Lupus in her brother, maternal aunt, and sister.       Allergies:  Allergies   Allergen Reactions    Amoxicillin Diarrhea, Nausea Only and Other (See Comments)     Stomach cramping and fatigue    Peanut-Containing Drug Products Shortness Of Breath    Diclofenac Sodium Unknown - High  "Severity     Flushing and soa     Medications:  Prior to Admission medications    Medication Sig Start Date End Date Taking? Authorizing Provider   aspirin 81 MG tablet Take 1 tablet by mouth Daily.    Moshe Joshi MD   atorvastatin (LIPITOR) 40 MG tablet Take 1 tablet by mouth once daily 8/25/23   Afua Dey APRN   b complex vitamins capsule Take 1 capsule by mouth Daily.    Moshe Joshi MD   coenzyme Q10 50 MG capsule capsule Take 1 capsule by mouth Daily.    Moshe Joshi MD   famotidine (PEPCID) 20 MG tablet Take 1 tablet by mouth Daily. 8/13/23   Moshe Joshi MD   gemfibrozil (LOPID) 600 MG tablet Take 1 tablet by mouth 2 (Two) Times a Day Before Meals. 10/5/22   Afua Dey APRN   levoFLOXacin (LEVAQUIN) 500 MG tablet Take 1 tablet by mouth Daily. 8/13/23   Moshe Joshi MD   metroNIDAZOLE (FLAGYL) 500 MG tablet Take 1 tablet by mouth 3 (Three) Times a Day. 8/13/23   Moshe Joshi MD   traMADol (ULTRAM) 50 MG tablet Take 1 tablet by mouth Every 8 (Eight) Hours As Needed for Moderate Pain. 10/16/23   Afua Dey APRN   triamcinolone (KENALOG) 0.1 % ointment Apply 1 application  topically to the appropriate area as directed 2 (Two) Times a Day. 9/26/23   Afua Dey APRN       Objective     Vital Signs: /83 (BP Location: Right arm, Patient Position: Sitting, Cuff Size: Adult)   Pulse 92   Temp 98.7 °F (37.1 °C) (Infrared)   Resp 16   Ht 157.5 cm (62.01\")   Wt 53.1 kg (117 lb)   SpO2 98%   BMI 21.39 kg/m²   Physical Exam  Constitutional:       Appearance: She is well-developed.   HENT:      Head: Normocephalic and atraumatic.      Right Ear: Tympanic membrane normal.      Left Ear: Tympanic membrane normal.   Eyes:      Conjunctiva/sclera: Conjunctivae normal.      Pupils: Pupils are equal, round, and reactive to light.   Neck:      Vascular: No JVD.   Cardiovascular:      Rate and Rhythm: Normal " rate and regular rhythm.      Heart sounds: Normal heart sounds. No murmur heard.     No friction rub. No gallop.   Pulmonary:      Effort: Pulmonary effort is normal. No respiratory distress.      Breath sounds: Normal breath sounds. No wheezing or rales.   Chest:      Chest wall: No tenderness.   Abdominal:      General: Bowel sounds are normal. There is no distension.      Palpations: Abdomen is soft.      Tenderness: There is abdominal tenderness (epigastric/RUQ tenderness.). There is no guarding or rebound.   Musculoskeletal:         General: No tenderness or deformity. Normal range of motion.      Cervical back: Neck supple.   Skin:     General: Skin is warm and dry.      Findings: No rash.   Neurological:      Mental Status: She is alert and oriented to person, place, and time.      Cranial Nerves: No cranial nerve deficit.      Motor: No abnormal muscle tone.      Deep Tendon Reflexes: Reflexes normal.   Psychiatric:         Behavior: Behavior normal.         Thought Content: Thought content normal.         Judgment: Judgment normal.         BMI is within normal parameters. No other follow-up for BMI required.      Results Reviewed:  Glucose   Date Value Ref Range Status   09/26/2023 106 (H) 70 - 99 mg/dL Final   10/21/2019 106 74 - 109 mg/dL Final     BUN   Date Value Ref Range Status   09/26/2023 6 (L) 8 - 27 mg/dL Final   10/21/2019 11 8 - 23 mg/dL Final     Creatinine   Date Value Ref Range Status   09/26/2023 0.42 (L) 0.57 - 1.00 mg/dL Final   10/21/2019 0.5 0.5 - 0.9 mg/dL Final     Sodium   Date Value Ref Range Status   09/26/2023 142 134 - 144 mmol/L Final   10/21/2019 141 136 - 145 mmol/L Final     Potassium   Date Value Ref Range Status   09/26/2023 3.6 3.5 - 5.2 mmol/L Final   10/21/2019 3.7 3.5 - 5.0 mmol/L Final     Chloride   Date Value Ref Range Status   09/26/2023 104 96 - 106 mmol/L Final   10/21/2019 102 98 - 111 mmol/L Final     CO2   Date Value Ref Range Status   10/21/2019 20 (L) 22 - 29  mmol/L Final     Total CO2   Date Value Ref Range Status   09/26/2023 24 20 - 29 mmol/L Final     Calcium   Date Value Ref Range Status   09/26/2023 9.3 8.7 - 10.3 mg/dL Final   10/21/2019 9.5 8.8 - 10.2 mg/dL Final     ALT (SGPT)   Date Value Ref Range Status   09/26/2023 28 0 - 32 IU/L Final   11/19/2019 35 (H) 5 - 33 U/L Final     AST (SGOT)   Date Value Ref Range Status   09/26/2023 17 0 - 40 IU/L Final   11/19/2019 28 5 - 32 U/L Final     WBC   Date Value Ref Range Status   09/26/2023 4.2 3.4 - 10.8 x10E3/uL Final   10/21/2019 6.2 4.8 - 10.8 K/uL Final     Hematocrit   Date Value Ref Range Status   09/26/2023 37.6 34.0 - 46.6 % Final   10/21/2019 45.7 37.0 - 47.0 % Final     Platelets   Date Value Ref Range Status   09/26/2023 213 150 - 450 x10E3/uL Final   10/21/2019 250 130 - 400 K/uL Final     Triglycerides   Date Value Ref Range Status   09/26/2023 273 (H) 0 - 149 mg/dL Final   10/21/2019 524 (H) 0 - 149 mg/dL Final     HDL Cholesterol   Date Value Ref Range Status   09/26/2023 71 >39 mg/dL Final   10/21/2019 75 65 - 121 mg/dL Final     Comment:     VALUES>60 MG/DL ARE ASSOCIATED WITH A DECREASED RISK OF  ATHEROSCLEROTIC CARDIOVASCULAR DISEASE     LDL Cholesterol    Date Value Ref Range Status   10/21/2019 see below <100 mg/dL Final     Comment:     When the triglyceride is >400 mg/dL the calculated LDL and  VLDL are not valid.  <100 MG/DL=OPITIMAL    100-129 MG/DL=DESIRABLE    130-159 MG/DL BORDERLINE=INCREASED RISK OF ATHEROSCLEROTIC  CARDIOVASCULAR DISEASE    > OR = 160 MG/DL=ASSOCIATED WITH AN INCREASE RISK OF  ATHEROSCLEROTIC CARDIOVASCULAR DISEASE   10/21/2019 100 <100 mg/dL Final     Comment:     <100 MG/DL=OPITIMAL    100-129 MG/DL=DESIRABLE    130-159 MG/DL BORDERLINE=INCREASED RISK OF ATHEROSCLEROTIC  CARDIOVASCULAR DISEASE    > OR = 160 MG/DL=ASSOCIATED WITH AN INCREASE RISK OF  ATHEROSCLEROTIC CARDIOVASCULAR DISEASE     LDL Chol Calc ([facility])   Date Value Ref Range Status   09/26/2023 103 (H) 0 -  99 mg/dL Final     Hemoglobin A1C   Date Value Ref Range Status   12/03/2021 5.1 % Final         Assessment / Plan     Assessment/Plan:  Diagnoses and all orders for this visit:    1. Epigastric pain (Primary)  -     Comprehensive Metabolic Panel  -     CBC & Differential  -     US Abdomen Limited  -     Amylase  -     Lipase  -     pantoprazole (Protonix) 40 MG EC tablet; Take 1 tablet by mouth Daily.  Dispense: 30 tablet; Refill: 1    2. Anorexia  -     Comprehensive Metabolic Panel  -     CBC & Differential  -     Amylase  -     Lipase  -     pantoprazole (Protonix) 40 MG EC tablet; Take 1 tablet by mouth Daily.  Dispense: 30 tablet; Refill: 1    3. RUQ pain  -     US Abdomen Limited  -     pantoprazole (Protonix) 40 MG EC tablet; Take 1 tablet by mouth Daily.  Dispense: 30 tablet; Refill: 1      Epigastric pain  An abdominal ultrasound has been ordered for the patient. CBC, CMP, amylase, and lipase lab work has also been ordered. A prescription for Protonix 40 mg has been sent to the patient's preferred pharmacy. The patient was encouraged to refrain from alcohol consumption.    Anorexia  The patient will have lab work collected including CBC, CMP, amylase, and lipase. Protonix 40 mg has been prescribed.    RUQ pain  Protonix 40 mg has been prescribed for the patient. An abdominal ultrasound has been ordered.      No follow-ups on file. unless patient needs to be seen sooner or acute issues arise.    Code Status: Full.     I have discussed the patient results/orders and and plan/recommendation with them at today's visit.      Transcribed from ambient dictation for COLLEEN Patton by Afua Banda.  10/26/23   12:02 CDT    Patient or patient representative verbalized consent to the visit recording.  I have personally performed the services described in this document as transcribed by the above individual, and it is both accurate and complete.  COLLEEN Patton  10/26/2023  17:34 CDT

## 2023-10-27 LAB
ALBUMIN SERPL-MCNC: 5 G/DL (ref 3.9–4.9)
ALBUMIN/GLOB SERPL: 2.2 {RATIO} (ref 1.2–2.2)
ALP SERPL-CCNC: 115 IU/L (ref 44–121)
ALT SERPL-CCNC: 31 IU/L (ref 0–32)
AMYLASE SERPL-CCNC: 35 U/L (ref 31–110)
AST SERPL-CCNC: 24 IU/L (ref 0–40)
BASOPHILS # BLD AUTO: 0.1 X10E3/UL (ref 0–0.2)
BASOPHILS NFR BLD AUTO: 1 %
BILIRUB SERPL-MCNC: 0.4 MG/DL (ref 0–1.2)
BUN SERPL-MCNC: 10 MG/DL (ref 8–27)
BUN/CREAT SERPL: 19 (ref 12–28)
CALCIUM SERPL-MCNC: 9.7 MG/DL (ref 8.7–10.3)
CHLORIDE SERPL-SCNC: 102 MMOL/L (ref 96–106)
CO2 SERPL-SCNC: 21 MMOL/L (ref 20–29)
CREAT SERPL-MCNC: 0.54 MG/DL (ref 0.57–1)
EGFRCR SERPLBLD CKD-EPI 2021: 100 ML/MIN/1.73
EOSINOPHIL # BLD AUTO: 0.1 X10E3/UL (ref 0–0.4)
EOSINOPHIL NFR BLD AUTO: 1 %
ERYTHROCYTE [DISTWIDTH] IN BLOOD BY AUTOMATED COUNT: 13 % (ref 11.7–15.4)
GLOBULIN SER CALC-MCNC: 2.3 G/DL (ref 1.5–4.5)
GLUCOSE SERPL-MCNC: ABNORMAL MG/DL
HCT VFR BLD AUTO: 40 % (ref 34–46.6)
HGB BLD-MCNC: 13.3 G/DL (ref 11.1–15.9)
IMM GRANULOCYTES # BLD AUTO: 0 X10E3/UL (ref 0–0.1)
IMM GRANULOCYTES NFR BLD AUTO: 0 %
LIPASE SERPL-CCNC: 29 U/L (ref 14–72)
LYMPHOCYTES # BLD AUTO: 1.4 X10E3/UL (ref 0.7–3.1)
LYMPHOCYTES NFR BLD AUTO: 25 %
Lab: NORMAL
MCH RBC QN AUTO: 31.7 PG (ref 26.6–33)
MCHC RBC AUTO-ENTMCNC: 33.3 G/DL (ref 31.5–35.7)
MCV RBC AUTO: 95 FL (ref 79–97)
MONOCYTES # BLD AUTO: 0.4 X10E3/UL (ref 0.1–0.9)
MONOCYTES NFR BLD AUTO: 7 %
NEUTROPHILS # BLD AUTO: 3.6 X10E3/UL (ref 1.4–7)
NEUTROPHILS NFR BLD AUTO: 66 %
PLATELET # BLD AUTO: 260 X10E3/UL (ref 150–450)
POTASSIUM SERPL-SCNC: ABNORMAL MMOL/L
PROT SERPL-MCNC: 7.3 G/DL (ref 6–8.5)
RBC # BLD AUTO: 4.2 X10E6/UL (ref 3.77–5.28)
SODIUM SERPL-SCNC: 142 MMOL/L (ref 134–144)
WBC # BLD AUTO: 5.5 X10E3/UL (ref 3.4–10.8)

## 2023-10-30 ENCOUNTER — HOSPITAL ENCOUNTER (OUTPATIENT)
Dept: CT IMAGING | Facility: HOSPITAL | Age: 68
Discharge: HOME | End: 2023-10-30
Admitting: NURSE PRACTITIONER
Payer: MEDICARE

## 2023-10-30 ENCOUNTER — TELEPHONE (OUTPATIENT)
Dept: INTERNAL MEDICINE | Facility: CLINIC | Age: 68
End: 2023-10-30
Payer: MEDICARE

## 2023-10-30 DIAGNOSIS — R10.13 EPIGASTRIC PAIN: ICD-10-CM

## 2023-10-30 DIAGNOSIS — R63.0 ANOREXIA: ICD-10-CM

## 2023-10-30 DIAGNOSIS — R10.11 RUQ PAIN: ICD-10-CM

## 2023-10-30 DIAGNOSIS — R93.89 ABNORMAL FINDING ON IMAGING: ICD-10-CM

## 2023-10-30 PROCEDURE — 74177 CT ABD & PELVIS W/CONTRAST: CPT

## 2023-10-30 PROCEDURE — 25510000001 IOPAMIDOL 61 % SOLUTION: Performed by: NURSE PRACTITIONER

## 2023-10-30 RX ADMIN — IOPAMIDOL 100 ML: 612 INJECTION, SOLUTION INTRAVENOUS at 12:06

## 2023-11-03 RX ORDER — GEMFIBROZIL 600 MG/1
600 TABLET, FILM COATED ORAL
Qty: 60 TABLET | Refills: 0 | Status: SHIPPED | OUTPATIENT
Start: 2023-11-03

## 2023-11-03 NOTE — TELEPHONE ENCOUNTER
Rx Refill Note  Requested Prescriptions     Pending Prescriptions Disp Refills    gemfibrozil (LOPID) 600 MG tablet [Pharmacy Med Name: Gemfibrozil 600 MG Oral Tablet] 60 tablet 0     Sig: TAKE 1 TABLET BY MOUTH TWICE DAILY BEFORE MEAL(S)      Last office visit with prescribing clinician: 10/26/2023   Next office visit with prescribing clinician: 12/26/2023                         Would you like a call back once the refill request has been completed: [] Yes [] No    If the office needs to give you a call back, can they leave a voicemail: [] Yes [] No    Thalia Carreon RN  11/03/23, 12:53 CDT

## 2023-11-13 DIAGNOSIS — R07.89 CHEST WALL PAIN: ICD-10-CM

## 2023-11-13 RX ORDER — TRAMADOL HYDROCHLORIDE 50 MG/1
50 TABLET ORAL EVERY 8 HOURS PRN
Qty: 30 TABLET | Refills: 0 | Status: SHIPPED | OUTPATIENT
Start: 2023-11-13

## 2023-11-13 NOTE — TELEPHONE ENCOUNTER
Rx Refill Note  Requested Prescriptions     Pending Prescriptions Disp Refills    traMADol (ULTRAM) 50 MG tablet 30 tablet 0     Sig: Take 1 tablet by mouth Every 8 (Eight) Hours As Needed for Moderate Pain.     UDS: 08/03/23  CSA: 09/15/23     Last office visit with prescribing clinician: 10/26/2023     Next office visit with prescribing clinician: 12/26/2023                         Would you like a call back once the refill request has been completed: [] Yes [] No    If the office needs to give you a call back, can they leave a voicemail: [] Yes [] No    Mariah Hanley MA  11/13/23, 10:33 CST

## 2023-11-13 NOTE — TELEPHONE ENCOUNTER
Caller: Karen Copeland    Relationship: Self    Best call back number: [phone]    Requested Prescriptions:   Requested Prescriptions     Pending Prescriptions Disp Refills    traMADol (ULTRAM) 50 MG tablet 30 tablet 0     Sig: Take 1 tablet by mouth Every 8 (Eight) Hours As Needed for Moderate Pain.        Pharmacy where request should be sent: [facility] [address] [place] [address] [phone] [facility] [phone] FX     Last office visit with prescribing clinician: 10/26/2023   Last telemedicine visit with prescribing clinician: Visit date not found   Next office visit with prescribing clinician: 12/26/2023         Does the patient have less than a 3 day supply:  [x] Yes  [] No      Dora Reyes Rep   11/13/23 10:13 CST

## 2023-11-28 DIAGNOSIS — E78.00 HIGH CHOLESTEROL: ICD-10-CM

## 2023-11-28 RX ORDER — ATORVASTATIN CALCIUM 40 MG/1
TABLET, FILM COATED ORAL
Qty: 90 TABLET | Refills: 0 | Status: SHIPPED | OUTPATIENT
Start: 2023-11-28

## 2023-11-28 NOTE — TELEPHONE ENCOUNTER
Rx Refill Note  Requested Prescriptions     Pending Prescriptions Disp Refills    atorvastatin (LIPITOR) 40 MG tablet [Pharmacy Med Name: Atorvastatin Calcium 40 MG Oral Tablet] 90 tablet 0     Sig: Take 1 tablet by mouth once daily      Last office visit with prescribing clinician: 10/26/2023   Last telemedicine visit with prescribing clinician: Visit date not found   Next office visit with prescribing clinician: 12/26/2023                         Would you like a call back once the refill request has been completed: [] Yes [] No    If the office needs to give you a call back, can they leave a voicemail: [] Yes [] No    Thalia Carreon RN  11/28/23, 07:10 CST

## 2023-12-04 RX ORDER — GEMFIBROZIL 600 MG/1
600 TABLET, FILM COATED ORAL
Qty: 60 TABLET | Refills: 6 | Status: SHIPPED | OUTPATIENT
Start: 2023-12-04

## 2023-12-11 DIAGNOSIS — R07.89 CHEST WALL PAIN: ICD-10-CM

## 2023-12-11 RX ORDER — TRAMADOL HYDROCHLORIDE 50 MG/1
50 TABLET ORAL EVERY 8 HOURS PRN
Qty: 30 TABLET | Refills: 0 | Status: SHIPPED | OUTPATIENT
Start: 2023-12-11

## 2023-12-11 NOTE — TELEPHONE ENCOUNTER
Caller: Karen Copeland    Relationship: Self    Best call back number: [phone]     Requested Prescriptions:   Requested Prescriptions     Pending Prescriptions Disp Refills    traMADol (ULTRAM) 50 MG tablet 30 tablet 0     Sig: Take 1 tablet by mouth Every 8 (Eight) Hours As Needed for Moderate Pain.        Pharmacy where request should be sent: [facility] [address] [place] [address] [phone] [facility] [phone]      Last office visit with prescribing clinician: 10/26/2023   Last telemedicine visit with prescribing clinician: Visit date not found   Next office visit with prescribing clinician: 12/26/2023     Additional details provided by patient: PATIENT ONLY HAS 2 LEFT    Does the patient have less than a 3 day supply:  [x] Yes  [] No    Would you like a call back once the refill request has been completed: [x] Yes [] No    If the office needs to give you a call back, can they leave a voicemail: [x] Yes [] No    Dora Esparza Rep   12/11/23 09:40 CST

## 2023-12-21 DIAGNOSIS — R63.0 ANOREXIA: ICD-10-CM

## 2023-12-21 DIAGNOSIS — R10.13 EPIGASTRIC PAIN: ICD-10-CM

## 2023-12-21 DIAGNOSIS — R10.11 RUQ PAIN: ICD-10-CM

## 2023-12-21 RX ORDER — PANTOPRAZOLE SODIUM 40 MG/1
40 TABLET, DELAYED RELEASE ORAL DAILY
Qty: 60 TABLET | Refills: 0 | Status: SHIPPED | OUTPATIENT
Start: 2023-12-21

## 2023-12-21 NOTE — TELEPHONE ENCOUNTER
Rx Refill Note  Requested Prescriptions     Pending Prescriptions Disp Refills    pantoprazole (PROTONIX) 40 MG EC tablet [Pharmacy Med Name: Pantoprazole Sodium 40 MG Oral Tablet Delayed Release] 60 tablet 0     Sig: Take 1 tablet by mouth once daily      Last office visit with prescribing clinician: 10/26/2023   Last telemedicine visit with prescribing clinician: Visit date not found   Next office visit with prescribing clinician: 12/26/2023                         Would you like a call back once the refill request has been completed: [] Yes [] No    If the office needs to give you a call back, can they leave a voicemail: [] Yes [] No    Thalia Carreon RN  12/21/23, 07:06 CST

## 2023-12-26 ENCOUNTER — OFFICE VISIT (OUTPATIENT)
Dept: INTERNAL MEDICINE | Facility: CLINIC | Age: 68
End: 2023-12-26
Payer: MEDICARE

## 2023-12-26 VITALS
RESPIRATION RATE: 16 BRPM | HEIGHT: 62 IN | DIASTOLIC BLOOD PRESSURE: 79 MMHG | TEMPERATURE: 98.2 F | HEART RATE: 88 BPM | OXYGEN SATURATION: 98 % | WEIGHT: 119 LBS | SYSTOLIC BLOOD PRESSURE: 144 MMHG | BODY MASS INDEX: 21.9 KG/M2

## 2023-12-26 DIAGNOSIS — M25.552 CHRONIC LEFT HIP PAIN: Primary | ICD-10-CM

## 2023-12-26 DIAGNOSIS — G89.29 CHRONIC LEFT HIP PAIN: Primary | ICD-10-CM

## 2023-12-26 DIAGNOSIS — M25.50 ARTHRALGIA, UNSPECIFIED JOINT: ICD-10-CM

## 2023-12-26 DIAGNOSIS — Z79.899 ENCOUNTER FOR LONG-TERM (CURRENT) USE OF OTHER MEDICATIONS: ICD-10-CM

## 2023-12-26 NOTE — PROGRESS NOTES
Subjective     Chief Complaint   Patient presents with    Pain     Pt comes in today to follow up on chronic pain.     Karen Copeland is a 68 y.o. female who returns for follow-up of chronic pain related to her left hip. her pain is stable.    Patients possible risk factors for increased risk of harm from opiates has been discussed and reviewed with her in the past.  The patient states that she has had no side effects of her medication, and shows no evidence of oversedation, confusion, slurred speech, or abnormal gait at this time.   The patient states that she is not modifying her own regimen, and that she is not taking any other illicit substances not prescribed by this office.  Patient states that she continues to benefit from the medication as far as her daily Functional Capacity and Abilities.  Treatment plan is reviewed in she continues to be compliant at this time.  Prior PEG scale reviewed at this time.  The patient has never had an overdose and needed a rescue medication in the past.  I am seeing this patient in conjunction with Dr. Terrell for Chronic opoid prescribing and will be sending refill request to him.   Prescription will be sent after review.      Pain characteristics:  Current pain level : 1/10 as reported by patient.    Pain Description :  dull. does not radiate and occurs  occasionally .   Alleviating factors :   pain medication  Aggravating factors : going up and down stairs  Associated Symptoms:  patient denies any problems        PEG scale:  1. Pain on average over the Last week 2/10  2. Patient states that durring the past week that thier pain has interfered with her enjoyment of life 5/10  with 10 bieng complete interference.  3. The patient also states that during the past week that that her pain has interfered with her general activity 3/10  with 10 bieng complete interference      Final Peg Score:10      No other problems, complaints. or concerns noted.  She take    Tramadol daily. She has chronic pain in her left hip and arthritis pain. States she is less limited with her pain meds. She has gained weight. Her stomach pain is much improved with the protonix.     Karen Copeland reports a pain score of 1.  Given her pain assessment as noted, treatment options were discussed and the following options were decided upon as a follow-up plan to address the patient's pain: continuation of current treatment plan for pain.     Otherwise complete ROS reviewed and negative except as mentioned in the HPI.    Past Medical History:   Past Medical History:   Diagnosis Date    Anxiety     Depression     Hyperlipidemia     Liver enzyme elevation     Sleep apnea      Past Surgical History:History reviewed. No pertinent surgical history.  Social History:  reports that she has been smoking cigarettes. She has a 3.75 pack-year smoking history. She has been exposed to tobacco smoke. She has never used smokeless tobacco. She reports current alcohol use of about 10.0 standard drinks of alcohol per week. She reports current drug use. Drug: Marijuana.    Family History: family history includes ADD / ADHD in her son; Arthritis in her mother and sister; Breast cancer in her sister; Cancer in her mother and sister; Hyperlipidemia in her mother; Lupus in her brother, maternal aunt, and sister.       Allergies:  Allergies   Allergen Reactions    Amoxicillin Diarrhea, Nausea Only and Other (See Comments)     Stomach cramping and fatigue    Peanut-Containing Drug Products Shortness Of Breath    Diclofenac Sodium Unknown - High Severity     Flushing and soa     Medications:  Prior to Admission medications    Medication Sig Start Date End Date Taking? Authorizing Provider   aspirin 81 MG tablet Take 1 tablet by mouth Daily.   Yes Provider, MD Moshe   atorvastatin (LIPITOR) 40 MG tablet Take 1 tablet by mouth once daily 11/28/23  Yes Afua Dey APRN   b complex vitamins capsule Take 1  "capsule by mouth Daily.   Yes Moshe Joshi MD   coenzyme Q10 50 MG capsule capsule Take 1 capsule by mouth Daily.   Yes Moshe Joshi MD   famotidine (PEPCID) 20 MG tablet Take 1 tablet by mouth Daily. 8/13/23  Yes Moshe Joshi MD   gemfibrozil (LOPID) 600 MG tablet Take 1 tablet by mouth 2 (Two) Times a Day Before Meals. 12/4/23  Yes Afua Dey APRN   pantoprazole (PROTONIX) 40 MG EC tablet Take 1 tablet by mouth once daily 12/21/23  Yes Afua Dey APRN   traMADol (ULTRAM) 50 MG tablet Take 1 tablet by mouth Every 8 (Eight) Hours As Needed for Moderate Pain. 12/11/23  Yes Afua Dey APRN   triamcinolone (KENALOG) 0.1 % ointment Apply 1 application  topically to the appropriate area as directed 2 (Two) Times a Day. 9/26/23  Yes Afua Dey APRN   levoFLOXacin (LEVAQUIN) 500 MG tablet Take 1 tablet by mouth Daily. 8/13/23 12/26/23 Yes Moshe Joshi MD   metroNIDAZOLE (FLAGYL) 500 MG tablet Take 1 tablet by mouth 3 (Three) Times a Day. 8/13/23 12/26/23 Yes Moshe Joshi MD       Objective     Vital Signs: /79 (BP Location: Left arm, Patient Position: Sitting, Cuff Size: Adult)   Pulse 88   Temp 98.2 °F (36.8 °C) (Infrared)   Resp 16   Ht 157.5 cm (62\")   Wt 54 kg (119 lb)   SpO2 98%   BMI 21.77 kg/m²     Physical Exam  Constitutional:       Appearance: Normal appearance. She is well-developed.   HENT:      Head: Normocephalic and atraumatic.   Eyes:      Conjunctiva/sclera: Conjunctivae normal.      Pupils: Pupils are equal, round, and reactive to light.   Neck:      Vascular: No JVD.   Cardiovascular:      Rate and Rhythm: Normal rate and regular rhythm.      Heart sounds: Normal heart sounds. No murmur heard.     No friction rub. No gallop.   Pulmonary:      Effort: Pulmonary effort is normal. No respiratory distress.      Breath sounds: Normal breath sounds. No wheezing or rales.   Chest:      Chest wall: " No tenderness.   Abdominal:      General: Bowel sounds are normal. There is no distension.      Palpations: Abdomen is soft.      Tenderness: There is no abdominal tenderness. There is no guarding or rebound.   Musculoskeletal:         General: No tenderness or deformity. Normal range of motion.      Cervical back: Neck supple.   Skin:     General: Skin is warm and dry.      Findings: No rash.   Neurological:      Mental Status: She is alert and oriented to person, place, and time.      Cranial Nerves: No cranial nerve deficit.      Motor: No abnormal muscle tone.      Deep Tendon Reflexes: Reflexes normal.   Psychiatric:         Behavior: Behavior normal.         Thought Content: Thought content normal.         Judgment: Judgment normal.       BMI is within normal parameters. No other follow-up for BMI required.    Results Reviewed:  Glucose   Date Value Ref Range Status   10/25/2023 CANCELED mg/dL      Comment:     Test not performed.  Serum was in contact with cells when received  which will make the result inaccurate.    Result canceled by the ancillary.     10/21/2019 106 74 - 109 mg/dL Final     BUN   Date Value Ref Range Status   10/25/2023 10 8 - 27 mg/dL Final   10/21/2019 11 8 - 23 mg/dL Final     Creatinine   Date Value Ref Range Status   10/25/2023 0.54 (L) 0.57 - 1.00 mg/dL Final   10/21/2019 0.5 0.5 - 0.9 mg/dL Final     Sodium   Date Value Ref Range Status   10/25/2023 142 134 - 144 mmol/L Final   10/21/2019 141 136 - 145 mmol/L Final     Potassium   Date Value Ref Range Status   10/25/2023 CANCELED mmol/L      Comment:     Test not performed.  Serum was in contact with cells when received  which will make the result inaccurate.    Result canceled by the ancillary.     10/21/2019 3.7 3.5 - 5.0 mmol/L Final     Chloride   Date Value Ref Range Status   10/25/2023 102 96 - 106 mmol/L Final   10/21/2019 102 98 - 111 mmol/L Final     CO2   Date Value Ref Range Status   10/21/2019 20 (L) 22 - 29 mmol/L  Final     Total CO2   Date Value Ref Range Status   10/25/2023 21 20 - 29 mmol/L Final     Calcium   Date Value Ref Range Status   10/25/2023 9.7 8.7 - 10.3 mg/dL Final   10/21/2019 9.5 8.8 - 10.2 mg/dL Final     ALT (SGPT)   Date Value Ref Range Status   10/25/2023 31 0 - 32 IU/L Final   11/19/2019 35 (H) 5 - 33 U/L Final     AST (SGOT)   Date Value Ref Range Status   10/25/2023 24 0 - 40 IU/L Final   11/19/2019 28 5 - 32 U/L Final     WBC   Date Value Ref Range Status   10/25/2023 5.5 3.4 - 10.8 x10E3/uL Final   10/21/2019 6.2 4.8 - 10.8 K/uL Final     Hematocrit   Date Value Ref Range Status   10/25/2023 40.0 34.0 - 46.6 % Final   10/21/2019 45.7 37.0 - 47.0 % Final     Platelets   Date Value Ref Range Status   10/25/2023 260 150 - 450 x10E3/uL Final   10/21/2019 250 130 - 400 K/uL Final     Triglycerides   Date Value Ref Range Status   09/26/2023 273 (H) 0 - 149 mg/dL Final   10/21/2019 524 (H) 0 - 149 mg/dL Final     HDL Cholesterol   Date Value Ref Range Status   09/26/2023 71 >39 mg/dL Final   10/21/2019 75 65 - 121 mg/dL Final     Comment:     VALUES>60 MG/DL ARE ASSOCIATED WITH A DECREASED RISK OF  ATHEROSCLEROTIC CARDIOVASCULAR DISEASE     LDL Cholesterol    Date Value Ref Range Status   10/21/2019 see below <100 mg/dL Final     Comment:     When the triglyceride is >400 mg/dL the calculated LDL and  VLDL are not valid.  <100 MG/DL=OPITIMAL    100-129 MG/DL=DESIRABLE    130-159 MG/DL BORDERLINE=INCREASED RISK OF ATHEROSCLEROTIC  CARDIOVASCULAR DISEASE    > OR = 160 MG/DL=ASSOCIATED WITH AN INCREASE RISK OF  ATHEROSCLEROTIC CARDIOVASCULAR DISEASE   10/21/2019 100 <100 mg/dL Final     Comment:     <100 MG/DL=OPITIMAL    100-129 MG/DL=DESIRABLE    130-159 MG/DL BORDERLINE=INCREASED RISK OF ATHEROSCLEROTIC  CARDIOVASCULAR DISEASE    > OR = 160 MG/DL=ASSOCIATED WITH AN INCREASE RISK OF  ATHEROSCLEROTIC CARDIOVASCULAR DISEASE     LDL Chol Calc ([facility])   Date Value Ref Range Status   09/26/2023 103 (H) 0 - 99 mg/dL  Final     Hemoglobin A1C   Date Value Ref Range Status   12/03/2021 5.1 % Final         Assessment / Plan     Assessment/Plan:  Diagnoses and all orders for this visit:    1. Chronic left hip pain (Primary)  -     ToxASSURE Select 13 (MW) - Urine, Clean Catch    2. Arthralgia, unspecified joint    3. Encounter for long-term (current) use of other medications  -     ToxASSURE Select 13 (MW) - Urine, Clean Catch        Return in about 3 months (around 3/26/2024). unless patient needs to be seen sooner or acute issues arise.    Code Status: Full.     I have discussed the patient results/orders and and plan/recommendation with them at today's visit.      Afua Dey, APRN   12/26/2023

## 2024-01-03 LAB — DRUGS UR: NORMAL

## 2024-01-10 DIAGNOSIS — R07.89 CHEST WALL PAIN: ICD-10-CM

## 2024-01-10 RX ORDER — TRAMADOL HYDROCHLORIDE 50 MG/1
50 TABLET ORAL EVERY 8 HOURS PRN
Qty: 30 TABLET | Refills: 0 | Status: SHIPPED | OUTPATIENT
Start: 2024-01-10

## 2024-01-10 NOTE — TELEPHONE ENCOUNTER
Caller: Karen Copeland    Relationship: Self    Best call back number: [phone]     Requested Prescriptions:   Requested Prescriptions     Pending Prescriptions Disp Refills    traMADol (ULTRAM) 50 MG tablet 30 tablet 0     Sig: Take 1 tablet by mouth Every 8 (Eight) Hours As Needed for Moderate Pain.        Pharmacy where request should be sent: [facility] [address] [place] [address] [phone] [facility] [phone]      Last office visit with prescribing clinician: 12/26/2023   Last telemedicine visit with prescribing clinician: Visit date not found   Next office visit with prescribing clinician: 9/30/2024     Does the patient have less than a 3 day supply:  [x] Yes  [] No    Would you like a call back once the refill request has been completed: [x] Yes [] No    If the office needs to give you a call back, can they leave a voicemail: [x] Yes [] No    Dora Padron Rep   01/10/24 09:05 CST

## 2024-01-10 NOTE — TELEPHONE ENCOUNTER
Rx Refill Note  Requested Prescriptions     Pending Prescriptions Disp Refills    traMADol (ULTRAM) 50 MG tablet 30 tablet 0     Sig: Take 1 tablet by mouth Every 8 (Eight) Hours As Needed for Moderate Pain.      Last office visit with office: 12/26/2023  Next office visit with office: 09/30/2024    UDS: ToxASSURE Select 13 (MW) - Urine, Clean Catch (12/25/2023 23:00)     DATE OF LAST REFILL: 12/11/2023      Controlled Substance Agreement: up to date               Zakia Yee MA  01/10/24, 09:10 CST

## 2024-02-07 DIAGNOSIS — R07.89 CHEST WALL PAIN: ICD-10-CM

## 2024-02-07 NOTE — TELEPHONE ENCOUNTER
Rx Refill Note  Requested Prescriptions     Pending Prescriptions Disp Refills    traMADol (ULTRAM) 50 MG tablet 30 tablet 0     Sig: Take 1 tablet by mouth Every 8 (Eight) Hours As Needed for Moderate Pain.      Last office visit with prescribing clinician: 12/26/2023   Last telemedicine visit with prescribing clinician: Visit date not found   Next office visit with prescribing clinician: 2/13/2024                         Would you like a call back once the refill request has been completed: [] Yes [] No    If the office needs to give you a call back, can they leave a voicemail: [] Yes [] No    Thalia Carreon RN  02/07/24, 10:06 CST

## 2024-02-07 NOTE — TELEPHONE ENCOUNTER
Caller: Karen Copeland    Relationship: Self    Best call back number:  [phone]      Requested Prescriptions     Pending Prescriptions Disp Refills    traMADol (ULTRAM) 50 MG tablet 30 tablet 0     Sig: Take 1 tablet by mouth Every 8 (Eight) Hours As Needed for Moderate Pain.        Pharmacy where request should be sent: [facility] [address] [place] [address] [phone] [facility] [phone] FX     Last office visit with prescribing clinician: 12/26/2023   Last telemedicine visit with prescribing clinician: Visit date not found   Next office visit with prescribing clinician: 2/13/2024     Additional details provided by patient:     3 DAYS    Does the patient have less than a 3 day supply:  [x] Yes  [] No    Would you like a call back once the refill request has been completed: [] Yes [] No    If the office needs to give you a call back, can they leave a voicemail: [] Yes [] No    Dora Boo   02/07/24 10:04 CST

## 2024-02-08 ENCOUNTER — OFFICE VISIT (OUTPATIENT)
Dept: INTERNAL MEDICINE | Facility: CLINIC | Age: 69
End: 2024-02-08
Payer: MEDICARE

## 2024-02-08 VITALS
WEIGHT: 115 LBS | HEART RATE: 89 BPM | HEIGHT: 62 IN | OXYGEN SATURATION: 98 % | SYSTOLIC BLOOD PRESSURE: 150 MMHG | DIASTOLIC BLOOD PRESSURE: 88 MMHG | TEMPERATURE: 98.9 F | RESPIRATION RATE: 18 BRPM | BODY MASS INDEX: 21.16 KG/M2

## 2024-02-08 DIAGNOSIS — R05.1 ACUTE COUGH: ICD-10-CM

## 2024-02-08 DIAGNOSIS — M79.10 MYALGIA: ICD-10-CM

## 2024-02-08 DIAGNOSIS — J10.1 INFLUENZA A: Primary | ICD-10-CM

## 2024-02-08 LAB
EXPIRATION DATE: ABNORMAL
FLUAV AG UPPER RESP QL IA.RAPID: DETECTED
FLUBV AG UPPER RESP QL IA.RAPID: NOT DETECTED
INTERNAL CONTROL: ABNORMAL
Lab: ABNORMAL
SARS-COV-2 AG UPPER RESP QL IA.RAPID: NOT DETECTED

## 2024-02-08 PROCEDURE — 99213 OFFICE O/P EST LOW 20 MIN: CPT | Performed by: NURSE PRACTITIONER

## 2024-02-08 PROCEDURE — 87428 SARSCOV & INF VIR A&B AG IA: CPT | Performed by: NURSE PRACTITIONER

## 2024-02-08 RX ORDER — BENZONATATE 200 MG/1
200 CAPSULE ORAL 3 TIMES DAILY PRN
Qty: 21 CAPSULE | Refills: 0 | Status: SHIPPED | OUTPATIENT
Start: 2024-02-08

## 2024-02-08 RX ORDER — TRAMADOL HYDROCHLORIDE 50 MG/1
50 TABLET ORAL EVERY 8 HOURS PRN
Qty: 30 TABLET | Refills: 0 | Status: SHIPPED | OUTPATIENT
Start: 2024-02-08

## 2024-02-08 RX ORDER — OSELTAMIVIR PHOSPHATE 75 MG/1
75 CAPSULE ORAL 2 TIMES DAILY
Qty: 10 CAPSULE | Refills: 0 | Status: SHIPPED | OUTPATIENT
Start: 2024-02-08

## 2024-02-08 NOTE — PROGRESS NOTES
Subjective     Chief Complaint   Patient presents with    Cough    Nasal Congestion    Generalized Body Aches       History of Present Illness  Karen Copeland is a 68-year-old female who presents today for evaluation of cough, congestion, and body aches.    Her cough began on 02/05/2024 while visiting her brother at [facility]. Her symptoms worsened on 02/06/2024. She denies having a fever. She has no appetite. She has been drinking am increased amount of water. She uses [facility]. She has been using Halls with menthol but would like a prescription for Tessalon Perles.    Her blood pressure is elevated. She states she has a lump on her collar bone at the bottom of her throat.  Cough  Associated symptoms include myalgias. Pertinent negatives include no fever.       Patient's PMR from outside medical facility reviewed and noted.    Review of Systems   Constitutional:  Positive for appetite change. Negative for fever.   HENT:  Positive for congestion.    Respiratory:  Positive for cough.    Musculoskeletal:  Positive for myalgias.        Otherwise complete ROS reviewed and negative except as mentioned in the HPI.    Past Medical History:   Past Medical History:   Diagnosis Date    Anxiety     Depression     Hyperlipidemia     Liver enzyme elevation     Sleep apnea      Past Surgical History:No past surgical history on file.  Social History:  reports that she quit smoking about 5 years ago. Her smoking use included cigarettes. She has a 3.75 pack-year smoking history. She has been exposed to tobacco smoke. She has never used smokeless tobacco. She reports current alcohol use of about 10.0 standard drinks of alcohol per week. She reports current drug use. Drug: Marijuana.    Family History: family history includes ADD / ADHD in her son; Arthritis in her mother and sister; Breast cancer in her sister; Cancer in her mother and sister; Hyperlipidemia in her mother; Lupus in her brother, maternal aunt,  "and sister.       Allergies:  Allergies   Allergen Reactions    Amoxicillin Diarrhea, Nausea Only and Other (See Comments)     Stomach cramping and fatigue    Peanut-Containing Drug Products Shortness Of Breath    Diclofenac Sodium Unknown - High Severity     Flushing and soa     Medications:  Prior to Admission medications    Medication Sig Start Date End Date Taking? Authorizing Provider   aspirin 81 MG tablet Take 1 tablet by mouth Daily.    ProviderMoshe MD   atorvastatin (LIPITOR) 40 MG tablet Take 1 tablet by mouth once daily 11/28/23   Afua Dey APRN   b complex vitamins capsule Take 1 capsule by mouth Daily.    ProviderMoshe MD   coenzyme Q10 50 MG capsule capsule Take 1 capsule by mouth Daily.    ProviderMoshe MD   famotidine (PEPCID) 20 MG tablet Take 1 tablet by mouth Daily. 8/13/23   Moshe Joshi MD   gemfibrozil (LOPID) 600 MG tablet Take 1 tablet by mouth 2 (Two) Times a Day Before Meals. 12/4/23   Afua Dey APRN   pantoprazole (PROTONIX) 40 MG EC tablet Take 1 tablet by mouth once daily 12/21/23   Afua Dey APRN   traMADol (ULTRAM) 50 MG tablet Take 1 tablet by mouth Every 8 (Eight) Hours As Needed for Moderate Pain. 2/8/24   Afua Dey APRN   triamcinolone (KENALOG) 0.1 % ointment Apply 1 application  topically to the appropriate area as directed 2 (Two) Times a Day. 9/26/23   Afua Dey APRN       Objective     Vital Signs: /88   Pulse 89   Temp 98.9 °F (37.2 °C)   Resp 18   Ht 157.5 cm (62\")   Wt 52.2 kg (115 lb)   SpO2 98%   BMI 21.03 kg/m²   Physical Exam  Vitals reviewed.   Constitutional:       Appearance: She is well-developed. She is ill-appearing.   HENT:      Head: Normocephalic and atraumatic.      Ears:      Comments: Normal.  Eyes:      Pupils: Pupils are equal, round, and reactive to light.   Neck:      Vascular: No JVD.   Cardiovascular:      Rate and Rhythm: Normal " rate and regular rhythm.      Comments: Increased heart rate.  Pulmonary:      Effort: Pulmonary effort is normal.   Abdominal:      General: Bowel sounds are normal.      Palpations: Abdomen is soft.   Musculoskeletal:         General: No deformity.      Cervical back: Normal range of motion and neck supple.   Lymphadenopathy:      Cervical: No cervical adenopathy.   Skin:     General: Skin is warm and dry.   Neurological:      Mental Status: She is alert and oriented to person, place, and time.   Psychiatric:         Behavior: Behavior normal.         Thought Content: Thought content normal.         Judgment: Judgment normal.     BMI is within normal parameters. No other follow-up for BMI required.      Results Reviewed:  Glucose   Date Value Ref Range Status   10/25/2023 CANCELED mg/dL      Comment:     Test not performed.  Serum was in contact with cells when received  which will make the result inaccurate.    Result canceled by the ancillary.     10/21/2019 106 74 - 109 mg/dL Final     BUN   Date Value Ref Range Status   10/25/2023 10 8 - 27 mg/dL Final   10/21/2019 11 8 - 23 mg/dL Final     Creatinine   Date Value Ref Range Status   10/25/2023 0.54 (L) 0.57 - 1.00 mg/dL Final   10/21/2019 0.5 0.5 - 0.9 mg/dL Final     Sodium   Date Value Ref Range Status   10/25/2023 142 134 - 144 mmol/L Final   10/21/2019 141 136 - 145 mmol/L Final     Potassium   Date Value Ref Range Status   10/25/2023 CANCELED mmol/L      Comment:     Test not performed.  Serum was in contact with cells when received  which will make the result inaccurate.    Result canceled by the ancillary.     10/21/2019 3.7 3.5 - 5.0 mmol/L Final     Chloride   Date Value Ref Range Status   10/25/2023 102 96 - 106 mmol/L Final   10/21/2019 102 98 - 111 mmol/L Final     CO2   Date Value Ref Range Status   10/21/2019 20 (L) 22 - 29 mmol/L Final     Total CO2   Date Value Ref Range Status   10/25/2023 21 20 - 29 mmol/L Final     Calcium   Date Value Ref  Range Status   10/25/2023 9.7 8.7 - 10.3 mg/dL Final   10/21/2019 9.5 8.8 - 10.2 mg/dL Final     ALT (SGPT)   Date Value Ref Range Status   10/25/2023 31 0 - 32 IU/L Final   11/19/2019 35 (H) 5 - 33 U/L Final     AST (SGOT)   Date Value Ref Range Status   10/25/2023 24 0 - 40 IU/L Final   11/19/2019 28 5 - 32 U/L Final     WBC   Date Value Ref Range Status   10/25/2023 5.5 3.4 - 10.8 x10E3/uL Final   10/21/2019 6.2 4.8 - 10.8 K/uL Final     Hematocrit   Date Value Ref Range Status   10/25/2023 40.0 34.0 - 46.6 % Final   10/21/2019 45.7 37.0 - 47.0 % Final     Platelets   Date Value Ref Range Status   10/25/2023 260 150 - 450 x10E3/uL Final   10/21/2019 250 130 - 400 K/uL Final     Triglycerides   Date Value Ref Range Status   09/26/2023 273 (H) 0 - 149 mg/dL Final   10/21/2019 524 (H) 0 - 149 mg/dL Final     HDL Cholesterol   Date Value Ref Range Status   09/26/2023 71 >39 mg/dL Final   10/21/2019 75 65 - 121 mg/dL Final     Comment:     VALUES>60 MG/DL ARE ASSOCIATED WITH A DECREASED RISK OF  ATHEROSCLEROTIC CARDIOVASCULAR DISEASE     LDL Cholesterol    Date Value Ref Range Status   10/21/2019 see below <100 mg/dL Final     Comment:     When the triglyceride is >400 mg/dL the calculated LDL and  VLDL are not valid.  <100 MG/DL=OPITIMAL    100-129 MG/DL=DESIRABLE    130-159 MG/DL BORDERLINE=INCREASED RISK OF ATHEROSCLEROTIC  CARDIOVASCULAR DISEASE    > OR = 160 MG/DL=ASSOCIATED WITH AN INCREASE RISK OF  ATHEROSCLEROTIC CARDIOVASCULAR DISEASE   10/21/2019 100 <100 mg/dL Final     Comment:     <100 MG/DL=OPITIMAL    100-129 MG/DL=DESIRABLE    130-159 MG/DL BORDERLINE=INCREASED RISK OF ATHEROSCLEROTIC  CARDIOVASCULAR DISEASE    > OR = 160 MG/DL=ASSOCIATED WITH AN INCREASE RISK OF  ATHEROSCLEROTIC CARDIOVASCULAR DISEASE     LDL Chol Calc ([facility])   Date Value Ref Range Status   09/26/2023 103 (H) 0 - 99 mg/dL Final     Hemoglobin A1C   Date Value Ref Range Status   12/03/2021 5.1 % Final         Assessment / Plan      Assessment/Plan:  Diagnoses and all orders for this visit:    1. Influenza A (Primary)  Comments:  We will prescribe Tamiflu as her swab today is positive for influenza A. She is instructed to take Tamiflu with food.  Orders:  -     oseltamivir (Tamiflu) 75 MG capsule; Take 1 capsule by mouth 2 (Two) Times a Day.  Dispense: 10 capsule; Refill: 0  -     benzonatate (TESSALON) 200 MG capsule; Take 1 capsule by mouth 3 (Three) Times a Day As Needed for Cough.  Dispense: 21 capsule; Refill: 0    2. Myalgia  Comments:  Influenza swab today is positive for influenza A.  Orders:  -     POCT SARS-CoV-2 Antigen SAVITA + Flu    3. Acute cough  Comments:  We will prescribe Tessalon Perles for cough.  Orders:  -     benzonatate (TESSALON) 200 MG capsule; Take 1 capsule by mouth 3 (Three) Times a Day As Needed for Cough.  Dispense: 21 capsule; Refill: 0    She is permitted to take Mucinex DM. She is advised not to take anything containing Sudafed. She is also advised to discontinue Korin-Atlantic. She is instructed to increase fluid intake with water, Gatorade, or juice.    She has an appointment on 02/13/2024. If she is not feeling better by that time she will reschedule.    Code Status: full    I have discussed the patient results/orders and and plan/recommendation with them at today's visit.      Transcribed from ambient dictation for COLLEEN Patton by Berhane Van.  02/08/24   11:38 CST    Patient or patient representative verbalized consent to the visit recording.  I have personally performed the services described in this document as transcribed by the above individual, and it is both accurate and complete.  COLLEEN Patton  2/8/2024  13:25 CST    Signed by:    COLLEEN Patton Date: 02/08/24

## 2024-02-13 ENCOUNTER — OFFICE VISIT (OUTPATIENT)
Dept: INTERNAL MEDICINE | Facility: CLINIC | Age: 69
End: 2024-02-13
Payer: MEDICARE

## 2024-02-13 VITALS
RESPIRATION RATE: 18 BRPM | HEART RATE: 75 BPM | BODY MASS INDEX: 21.53 KG/M2 | HEIGHT: 62 IN | TEMPERATURE: 97.8 F | OXYGEN SATURATION: 99 % | WEIGHT: 117 LBS | DIASTOLIC BLOOD PRESSURE: 83 MMHG | SYSTOLIC BLOOD PRESSURE: 130 MMHG

## 2024-02-13 DIAGNOSIS — R22.1 NECK SWELLING: Primary | ICD-10-CM

## 2024-02-13 PROCEDURE — 99212 OFFICE O/P EST SF 10 MIN: CPT | Performed by: NURSE PRACTITIONER

## 2024-02-19 DIAGNOSIS — R10.11 RUQ PAIN: ICD-10-CM

## 2024-02-19 DIAGNOSIS — R10.13 EPIGASTRIC PAIN: ICD-10-CM

## 2024-02-19 DIAGNOSIS — R63.0 ANOREXIA: ICD-10-CM

## 2024-02-19 RX ORDER — PANTOPRAZOLE SODIUM 40 MG/1
40 TABLET, DELAYED RELEASE ORAL DAILY
Qty: 60 TABLET | Refills: 0 | Status: SHIPPED | OUTPATIENT
Start: 2024-02-19

## 2024-02-19 NOTE — TELEPHONE ENCOUNTER
Rx Refill Note  Requested Prescriptions     Pending Prescriptions Disp Refills    pantoprazole (PROTONIX) 40 MG EC tablet [Pharmacy Med Name: Pantoprazole Sodium 40 MG Oral Tablet Delayed Release] 60 tablet 0     Sig: Take 1 tablet by mouth once daily      Last office visit with prescribing clinician: 2/13/2024   Last telemedicine visit with prescribing clinician: Visit date not found   Next office visit with prescribing clinician: 9/30/2024                         Would you like a call back once the refill request has been completed: [] Yes [] No    If the office needs to give you a call back, can they leave a voicemail: [] Yes [] No    Thalia Carreon RN  02/19/24, 07:03 CST

## 2024-02-28 DIAGNOSIS — E78.00 HIGH CHOLESTEROL: ICD-10-CM

## 2024-02-28 RX ORDER — ATORVASTATIN CALCIUM 40 MG/1
TABLET, FILM COATED ORAL
Qty: 90 TABLET | Refills: 0 | Status: SHIPPED | OUTPATIENT
Start: 2024-02-28

## 2024-03-05 DIAGNOSIS — R07.89 CHEST WALL PAIN: ICD-10-CM

## 2024-03-05 NOTE — TELEPHONE ENCOUNTER
Caller: Karen Copeland    Relationship: Self    Best call back number: [phone]  Requested Prescriptions:   Requested Prescriptions     Pending Prescriptions Disp Refills    traMADol (ULTRAM) 50 MG tablet 30 tablet 0     Sig: Take 1 tablet by mouth Every 8 (Eight) Hours As Needed for Moderate Pain.        Pharmacy where request should be sent:  [facility] [address] [place] [address] [phone] [facility] [phone] FX    Last office visit with prescribing clinician: 2/13/2024   Last telemedicine visit with prescribing clinician: Visit date not found   Next office visit with prescribing clinician: 9/30/2024     Does the patient have less than a 3 day supply:  [x] Yes  [] No    Would you like a call back once the refill request has been completed: [] Yes [x] No    If the office needs to give you a call back, can they leave a voicemail: [] Yes [x] No    Dora aHll Rep   03/05/24 08:57 CST

## 2024-03-06 RX ORDER — TRAMADOL HYDROCHLORIDE 50 MG/1
50 TABLET ORAL EVERY 8 HOURS PRN
Qty: 30 TABLET | Refills: 0 | Status: SHIPPED | OUTPATIENT
Start: 2024-03-06

## 2024-03-11 ENCOUNTER — OFFICE VISIT (OUTPATIENT)
Dept: INTERNAL MEDICINE | Facility: CLINIC | Age: 69
End: 2024-03-11
Payer: MEDICARE

## 2024-03-11 VITALS
RESPIRATION RATE: 16 BRPM | SYSTOLIC BLOOD PRESSURE: 137 MMHG | HEIGHT: 62 IN | BODY MASS INDEX: 21.35 KG/M2 | TEMPERATURE: 97.8 F | WEIGHT: 116 LBS | DIASTOLIC BLOOD PRESSURE: 81 MMHG | HEART RATE: 101 BPM | OXYGEN SATURATION: 99 %

## 2024-03-11 DIAGNOSIS — R07.2 PRECORDIAL PAIN: Primary | ICD-10-CM

## 2024-03-11 DIAGNOSIS — R00.2 PALPITATIONS: ICD-10-CM

## 2024-03-11 PROCEDURE — 99214 OFFICE O/P EST MOD 30 MIN: CPT | Performed by: NURSE PRACTITIONER

## 2024-03-11 PROCEDURE — 1160F RVW MEDS BY RX/DR IN RCRD: CPT | Performed by: NURSE PRACTITIONER

## 2024-03-11 PROCEDURE — 1159F MED LIST DOCD IN RCRD: CPT | Performed by: NURSE PRACTITIONER

## 2024-03-11 PROCEDURE — 93000 ELECTROCARDIOGRAM COMPLETE: CPT | Performed by: NURSE PRACTITIONER

## 2024-03-11 NOTE — PROGRESS NOTES
Subjective     Chief Complaint   Patient presents with    Chest Pain       Hyperlipidemia  Associated symptoms include shortness of breath. Pertinent negatives include no chest pain or myalgias.     Pt presents today for flutter, chest tightness, and shortness of breath. This has been going on for a month or two. She states that the shortness of breath is only when she is sitting or lying down. It starts with the flutter, then the chest tightness, and then she gets short of breath and gets dizzy or light headed. She states that this normally happens when she is relaxing, whether she is sitting or lying down. She states her blood pressure the other night was 152/upper 80s. When she is lightheaded, she feels like she is swaying. On Tuesday when she was driving she got lightheaded and felt like she was weaving, that is when she decided she needed to go to the doctor. The flutter has gotten more frequent. She does not get short of breath on exertion.  Occasionally achy pain will radiate down her left arm when the tightness occurs. She takes a baby aspirin daily. States her  causes her to be anxious. Denies leg swelling or chest pain. States she often has ringing in her ears and headaches.       Review of Systems   Constitutional:  Negative for chills, fatigue and fever.   HENT:  Positive for tinnitus. Negative for congestion.    Eyes:  Negative for visual disturbance.   Respiratory:  Positive for chest tightness and shortness of breath. Negative for cough and wheezing.    Cardiovascular:  Positive for palpitations (flutter). Negative for chest pain and leg swelling.   Gastrointestinal:  Positive for nausea. Negative for abdominal distention, abdominal pain, anal bleeding, constipation, diarrhea and vomiting.   Genitourinary:  Negative for difficulty urinating and urgency.   Musculoskeletal:  Positive for back pain. Negative for myalgias.        Achy pain down left arm and lower back when chest gets tight    Neurological:  Positive for dizziness, light-headedness and headaches. Negative for seizures, syncope, weakness and numbness.   Psychiatric/Behavioral:  The patient is nervous/anxious.       Otherwise complete ROS reviewed and negative except as mentioned in the HPI.    Past Medical History:   Past Medical History:   Diagnosis Date    Anxiety     Depression     Hyperlipidemia     Liver enzyme elevation     Sleep apnea      Past Surgical History:History reviewed. No pertinent surgical history.  Social History:  reports that she quit smoking about 5 years ago. Her smoking use included cigarettes. She started smoking about 20 years ago. She has a 3.8 pack-year smoking history. She has been exposed to tobacco smoke. She has never used smokeless tobacco. She reports current alcohol use of about 10.0 standard drinks of alcohol per week. She reports current drug use. Drug: Marijuana.    Family History: family history includes ADD / ADHD in her son; Arthritis in her mother and sister; Breast cancer in her sister; Cancer in her mother and sister; Hyperlipidemia in her mother; Lupus in her brother, maternal aunt, and sister.       Allergies:  Allergies   Allergen Reactions    Amoxicillin Diarrhea, Nausea Only and Other (See Comments)     Stomach cramping and fatigue    Peanut-Containing Drug Products Shortness Of Breath    Diclofenac Sodium Unknown - High Severity     Flushing and soa     Medications:  Prior to Admission medications    Medication Sig Start Date End Date Taking? Authorizing Provider   aspirin 81 MG tablet Take 1 tablet by mouth Daily.    Provider, MD Moshe   atorvastatin (LIPITOR) 40 MG tablet Take 1 tablet by mouth once daily 2/28/24   Afua Dye APRN   b complex vitamins capsule Take 1 capsule by mouth Daily.    Provider, MD Moshe   benzonatate (TESSALON) 200 MG capsule Take 1 capsule by mouth 3 (Three) Times a Day As Needed for Cough. 2/8/24   Afua Dey APRN  "  coenzyme Q10 50 MG capsule capsule Take 1 capsule by mouth Daily.    Provider, MD Moshe   famotidine (PEPCID) 20 MG tablet Take 1 tablet by mouth Daily. 8/13/23   ProviderMoshe MD   gemfibrozil (LOPID) 600 MG tablet Take 1 tablet by mouth 2 (Two) Times a Day Before Meals. 12/4/23   Afua Dey APRN   oseltamivir (Tamiflu) 75 MG capsule Take 1 capsule by mouth 2 (Two) Times a Day. 2/8/24   Afua Dey APRN   pantoprazole (PROTONIX) 40 MG EC tablet Take 1 tablet by mouth once daily 2/19/24   Afua Dey APRN   traMADol (ULTRAM) 50 MG tablet Take 1 tablet by mouth Every 8 (Eight) Hours As Needed for Moderate Pain. 3/6/24   Afua Dey APRN   triamcinolone (KENALOG) 0.1 % ointment Apply 1 application  topically to the appropriate area as directed 2 (Two) Times a Day. 9/26/23   Afua Dey APRN       Objective     Vital Signs: /81   Pulse 101   Temp 97.8 °F (36.6 °C)   Resp 16   Ht 157.5 cm (62\")   Wt 52.6 kg (116 lb)   SpO2 99%   BMI 21.22 kg/m²   Physical Exam  Vitals reviewed.   Constitutional:       Appearance: She is well-developed.   HENT:      Head: Normocephalic and atraumatic.   Eyes:      Pupils: Pupils are equal, round, and reactive to light.   Neck:      Vascular: No JVD.   Cardiovascular:      Rate and Rhythm: Normal rate and regular rhythm.      Heart sounds: Normal heart sounds.   Pulmonary:      Effort: Pulmonary effort is normal.      Breath sounds: Normal breath sounds.   Abdominal:      General: Bowel sounds are normal.      Palpations: Abdomen is soft.   Musculoskeletal:         General: No deformity.      Cervical back: Normal range of motion and neck supple.   Lymphadenopathy:      Cervical: No cervical adenopathy.   Skin:     General: Skin is warm and dry.   Neurological:      Mental Status: She is alert and oriented to person, place, and time.   Psychiatric:         Behavior: Behavior normal.         " Thought Content: Thought content normal.         Judgment: Judgment normal.       BMI is within normal parameters. No other follow-up for BMI required.    Results Reviewed:  Glucose   Date Value Ref Range Status   10/25/2023 CANCELED mg/dL      Comment:     Test not performed.  Serum was in contact with cells when received  which will make the result inaccurate.    Result canceled by the ancillary.     10/21/2019 106 74 - 109 mg/dL Final     BUN   Date Value Ref Range Status   10/25/2023 10 8 - 27 mg/dL Final   10/21/2019 11 8 - 23 mg/dL Final     Creatinine   Date Value Ref Range Status   10/25/2023 0.54 (L) 0.57 - 1.00 mg/dL Final   10/21/2019 0.5 0.5 - 0.9 mg/dL Final     Sodium   Date Value Ref Range Status   10/25/2023 142 134 - 144 mmol/L Final   10/21/2019 141 136 - 145 mmol/L Final     Potassium   Date Value Ref Range Status   10/25/2023 CANCELED mmol/L      Comment:     Test not performed.  Serum was in contact with cells when received  which will make the result inaccurate.    Result canceled by the ancillary.     10/21/2019 3.7 3.5 - 5.0 mmol/L Final     Chloride   Date Value Ref Range Status   10/25/2023 102 96 - 106 mmol/L Final   10/21/2019 102 98 - 111 mmol/L Final     CO2   Date Value Ref Range Status   10/21/2019 20 (L) 22 - 29 mmol/L Final     Total CO2   Date Value Ref Range Status   10/25/2023 21 20 - 29 mmol/L Final     Calcium   Date Value Ref Range Status   10/25/2023 9.7 8.7 - 10.3 mg/dL Final   10/21/2019 9.5 8.8 - 10.2 mg/dL Final     ALT (SGPT)   Date Value Ref Range Status   10/25/2023 31 0 - 32 IU/L Final   11/19/2019 35 (H) 5 - 33 U/L Final     AST (SGOT)   Date Value Ref Range Status   10/25/2023 24 0 - 40 IU/L Final   11/19/2019 28 5 - 32 U/L Final     WBC   Date Value Ref Range Status   10/25/2023 5.5 3.4 - 10.8 x10E3/uL Final   10/21/2019 6.2 4.8 - 10.8 K/uL Final     Hematocrit   Date Value Ref Range Status   10/25/2023 40.0 34.0 - 46.6 % Final   10/21/2019 45.7 37.0 - 47.0 % Final      Platelets   Date Value Ref Range Status   10/25/2023 260 150 - 450 x10E3/uL Final   10/21/2019 250 130 - 400 K/uL Final     Triglycerides   Date Value Ref Range Status   09/26/2023 273 (H) 0 - 149 mg/dL Final   10/21/2019 524 (H) 0 - 149 mg/dL Final     HDL Cholesterol   Date Value Ref Range Status   09/26/2023 71 >39 mg/dL Final   10/21/2019 75 65 - 121 mg/dL Final     Comment:     VALUES>60 MG/DL ARE ASSOCIATED WITH A DECREASED RISK OF  ATHEROSCLEROTIC CARDIOVASCULAR DISEASE     LDL Cholesterol    Date Value Ref Range Status   10/21/2019 see below <100 mg/dL Final     Comment:     When the triglyceride is >400 mg/dL the calculated LDL and  VLDL are not valid.  <100 MG/DL=OPITIMAL    100-129 MG/DL=DESIRABLE    130-159 MG/DL BORDERLINE=INCREASED RISK OF ATHEROSCLEROTIC  CARDIOVASCULAR DISEASE    > OR = 160 MG/DL=ASSOCIATED WITH AN INCREASE RISK OF  ATHEROSCLEROTIC CARDIOVASCULAR DISEASE   10/21/2019 100 <100 mg/dL Final     Comment:     <100 MG/DL=OPITIMAL    100-129 MG/DL=DESIRABLE    130-159 MG/DL BORDERLINE=INCREASED RISK OF ATHEROSCLEROTIC  CARDIOVASCULAR DISEASE    > OR = 160 MG/DL=ASSOCIATED WITH AN INCREASE RISK OF  ATHEROSCLEROTIC CARDIOVASCULAR DISEASE     LDL Chol Calc (NIH)   Date Value Ref Range Status   09/26/2023 103 (H) 0 - 99 mg/dL Final     Hemoglobin A1C   Date Value Ref Range Status   12/03/2021 5.1 % Final         Assessment / Plan     Assessment/Plan:  Diagnoses and all orders for this visit:    1. Precordial pain (Primary)  -     Adult Stress Echo W/ Cont or Stress Agent if Necessary Per Protocol; Future  -     ECG 12 Lead  -     CBC & Differential  -     Comprehensive Metabolic Panel  -     TSH  -     T4  -     Magnesium  -     Lipid Panel    2. Palpitations  -     CBC & Differential  -     Comprehensive Metabolic Panel  -     TSH  -     T4  -     Magnesium  -     Lipid Panel        ECG 12 Lead    Date/Time: 3/11/2024 11:46 AM  Performed by: Afua Dey APRN    Authorized by:  Yissel, Afua Flores, COLLEEN  Comparison: not compared with previous ECG   Previous ECG: no previous ECG available  Rhythm: sinus rhythm  Rate: normal  QRS axis: normal  Other findings: left ventricular hypertrophy    Clinical impression: non-specific ECG         Return in about 1 month (around 4/11/2024). unless patient needs to be seen sooner or acute issues arise.    Code Status: Full.     I have discussed the patient results/orders and and plan/recommendation with them at today's visit.      Signed by:    COLLEEN Patton Date: 03/11/24

## 2024-04-01 ENCOUNTER — HOSPITAL ENCOUNTER (OUTPATIENT)
Dept: CARDIOLOGY | Facility: HOSPITAL | Age: 69
Discharge: HOME | End: 2024-04-01
Admitting: NURSE PRACTITIONER
Payer: MEDICARE

## 2024-04-01 VITALS
BODY MASS INDEX: 21.35 KG/M2 | WEIGHT: 116 LBS | HEART RATE: 81 BPM | DIASTOLIC BLOOD PRESSURE: 70 MMHG | HEIGHT: 62 IN | SYSTOLIC BLOOD PRESSURE: 143 MMHG

## 2024-04-01 DIAGNOSIS — R07.2 PRECORDIAL PAIN: ICD-10-CM

## 2024-04-01 PROCEDURE — 25510000001 PERFLUTREN PROTEIN A MICROSPH SUSPENSION: Performed by: INTERNAL MEDICINE

## 2024-04-01 PROCEDURE — 93017 CV STRESS TEST TRACING ONLY: CPT

## 2024-04-01 PROCEDURE — 93350 STRESS TTE ONLY: CPT

## 2024-04-01 RX ADMIN — HUMAN ALBUMIN MICROSPHERES AND PERFLUTREN 0.66 MG: 10; .22 INJECTION, SOLUTION INTRAVENOUS at 13:20

## 2024-04-02 DIAGNOSIS — R94.39 ABNORMAL FINDING ON CARDIOVASCULAR STRESS TEST: Primary | ICD-10-CM

## 2024-04-02 LAB
BH CV STRESS BP STAGE 1: NORMAL
BH CV STRESS BP STAGE 2: NORMAL
BH CV STRESS DURATION MIN STAGE 1: 3
BH CV STRESS DURATION MIN STAGE 2: 2
BH CV STRESS DURATION SEC STAGE 1: 0
BH CV STRESS DURATION SEC STAGE 2: 18
BH CV STRESS ECHO POST STRESS EJECTION FRACTION EF: 75 %
BH CV STRESS GRADE STAGE 1: 10
BH CV STRESS GRADE STAGE 2: 12
BH CV STRESS HR STAGE 1: 116
BH CV STRESS HR STAGE 2: 142
BH CV STRESS METS STAGE 1: 5
BH CV STRESS METS STAGE 2: 7.5
BH CV STRESS PROTOCOL 1: NORMAL
BH CV STRESS RECOVERY BP: NORMAL MMHG
BH CV STRESS RECOVERY HR: 85 BPM
BH CV STRESS SPEED STAGE 1: 1.7
BH CV STRESS SPEED STAGE 2: 2.5
BH CV STRESS STAGE 1: 1
BH CV STRESS STAGE 2: 2
MAXIMAL PREDICTED HEART RATE: 152 BPM
PERCENT MAX PREDICTED HR: 93.42 %
STRESS BASELINE BP: NORMAL MMHG
STRESS BASELINE HR: 81 BPM
STRESS PERCENT HR: 110 %
STRESS POST ESTIMATED WORKLOAD: 7.5 METS
STRESS POST EXERCISE DUR MIN: 5 MIN
STRESS POST EXERCISE DUR SEC: 18 SEC
STRESS POST PEAK BP: NORMAL MMHG
STRESS POST PEAK HR: 142 BPM
STRESS TARGET HR: 129 BPM

## 2024-04-03 DIAGNOSIS — R07.89 CHEST WALL PAIN: ICD-10-CM

## 2024-04-03 NOTE — TELEPHONE ENCOUNTER
Caller: Min Karen Elham    Relationship: Self    Best call back number: [phone]     Requested Prescriptions:   Requested Prescriptions     Pending Prescriptions Disp Refills    traMADol (ULTRAM) 50 MG tablet 30 tablet 0     Sig: Take 1 tablet by mouth Every 8 (Eight) Hours As Needed for Moderate Pain.        Pharmacy where request should be sent: [facility] [address] [place] [address] [phone] [facility] [phone]      Last office visit with prescribing clinician: 3/11/2024   Last telemedicine visit with prescribing clinician: Visit date not found   Next office visit with prescribing clinician: 9/30/2024     Additional details provided by patient: HAS 3 LEFT     Does the patient have less than a 3 day supply:  [x] Yes  [] No    Would you like a call back once the refill request has been completed: [x] Yes [] No    If the office needs to give you a call back, can they leave a voicemail: [x] Yes [] No    Dora Patel Rep   04/03/24 10:03 CDT

## 2024-04-04 RX ORDER — TRAMADOL HYDROCHLORIDE 50 MG/1
50 TABLET ORAL EVERY 8 HOURS PRN
Qty: 30 TABLET | Refills: 0 | Status: SHIPPED | OUTPATIENT
Start: 2024-04-04

## 2024-04-10 ENCOUNTER — TELEPHONE (OUTPATIENT)
Dept: INTERNAL MEDICINE | Facility: CLINIC | Age: 69
End: 2024-04-10

## 2024-04-11 ENCOUNTER — LAB (OUTPATIENT)
Dept: INTERNAL MEDICINE | Facility: CLINIC | Age: 69
End: 2024-04-11
Payer: MEDICARE

## 2024-04-11 DIAGNOSIS — R00.2 PALPITATIONS: ICD-10-CM

## 2024-04-11 DIAGNOSIS — R07.2 PRECORDIAL PAIN: ICD-10-CM

## 2024-04-12 LAB
ALBUMIN SERPL-MCNC: 5 G/DL (ref 3.9–4.9)
ALBUMIN/GLOB SERPL: 2.2 {RATIO} (ref 1.2–2.2)
ALP SERPL-CCNC: 115 IU/L (ref 44–121)
ALT SERPL-CCNC: 19 IU/L (ref 0–32)
AST SERPL-CCNC: 17 IU/L (ref 0–40)
BASOPHILS # BLD AUTO: 0 X10E3/UL (ref 0–0.2)
BASOPHILS NFR BLD AUTO: 1 %
BILIRUB SERPL-MCNC: 0.4 MG/DL (ref 0–1.2)
BUN SERPL-MCNC: 6 MG/DL (ref 8–27)
BUN/CREAT SERPL: 10 (ref 12–28)
CALCIUM SERPL-MCNC: 10 MG/DL (ref 8.7–10.3)
CHLORIDE SERPL-SCNC: 102 MMOL/L (ref 96–106)
CHOLEST SERPL-MCNC: 212 MG/DL (ref 100–199)
CO2 SERPL-SCNC: 20 MMOL/L (ref 20–29)
CREAT SERPL-MCNC: 0.59 MG/DL (ref 0.57–1)
EGFRCR SERPLBLD CKD-EPI 2021: 98 ML/MIN/1.73
EOSINOPHIL # BLD AUTO: 0.1 X10E3/UL (ref 0–0.4)
EOSINOPHIL NFR BLD AUTO: 1 %
ERYTHROCYTE [DISTWIDTH] IN BLOOD BY AUTOMATED COUNT: 12.9 % (ref 11.7–15.4)
GLOBULIN SER CALC-MCNC: 2.3 G/DL (ref 1.5–4.5)
GLUCOSE SERPL-MCNC: 104 MG/DL (ref 70–99)
HCT VFR BLD AUTO: 39.4 % (ref 34–46.6)
HDLC SERPL-MCNC: 63 MG/DL
HGB BLD-MCNC: 13.2 G/DL (ref 11.1–15.9)
IMM GRANULOCYTES # BLD AUTO: 0 X10E3/UL (ref 0–0.1)
IMM GRANULOCYTES NFR BLD AUTO: 0 %
LDLC SERPL CALC-MCNC: 92 MG/DL (ref 0–99)
LYMPHOCYTES # BLD AUTO: 1.7 X10E3/UL (ref 0.7–3.1)
LYMPHOCYTES NFR BLD AUTO: 29 %
MAGNESIUM SERPL-MCNC: 1.9 MG/DL (ref 1.6–2.3)
MCH RBC QN AUTO: 30.7 PG (ref 26.6–33)
MCHC RBC AUTO-ENTMCNC: 33.5 G/DL (ref 31.5–35.7)
MCV RBC AUTO: 92 FL (ref 79–97)
MONOCYTES # BLD AUTO: 0.4 X10E3/UL (ref 0.1–0.9)
MONOCYTES NFR BLD AUTO: 6 %
NEUTROPHILS # BLD AUTO: 3.6 X10E3/UL (ref 1.4–7)
NEUTROPHILS NFR BLD AUTO: 63 %
PLATELET # BLD AUTO: 285 X10E3/UL (ref 150–450)
POTASSIUM SERPL-SCNC: 4.4 MMOL/L (ref 3.5–5.2)
PROT SERPL-MCNC: 7.3 G/DL (ref 6–8.5)
RBC # BLD AUTO: 4.3 X10E6/UL (ref 3.77–5.28)
SODIUM SERPL-SCNC: 141 MMOL/L (ref 134–144)
T4 SERPL-MCNC: 6.3 UG/DL (ref 4.5–12)
TRIGL SERPL-MCNC: 351 MG/DL (ref 0–149)
TSH SERPL DL<=0.005 MIU/L-ACNC: 1.34 UIU/ML (ref 0.45–4.5)
VLDLC SERPL CALC-MCNC: 57 MG/DL (ref 5–40)
WBC # BLD AUTO: 5.7 X10E3/UL (ref 3.4–10.8)

## 2024-04-18 DIAGNOSIS — R63.0 ANOREXIA: ICD-10-CM

## 2024-04-18 DIAGNOSIS — R10.13 EPIGASTRIC PAIN: ICD-10-CM

## 2024-04-18 DIAGNOSIS — R10.11 RUQ PAIN: ICD-10-CM

## 2024-04-18 RX ORDER — PANTOPRAZOLE SODIUM 40 MG/1
40 TABLET, DELAYED RELEASE ORAL DAILY
Qty: 60 TABLET | Refills: 1 | Status: SHIPPED | OUTPATIENT
Start: 2024-04-18

## 2024-04-26 ENCOUNTER — TELEPHONE (OUTPATIENT)
Dept: INTERNAL MEDICINE | Facility: CLINIC | Age: 69
End: 2024-04-26

## 2024-04-26 RX ORDER — FAMOTIDINE 40 MG/1
40 TABLET, FILM COATED ORAL DAILY
Qty: 30 TABLET | Refills: 0 | Status: SHIPPED | OUTPATIENT
Start: 2024-04-26

## 2024-04-26 RX ORDER — DICYCLOMINE HYDROCHLORIDE 10 MG/1
10 CAPSULE ORAL
Qty: 120 CAPSULE | Refills: 0 | Status: SHIPPED | OUTPATIENT
Start: 2024-04-26

## 2024-04-26 RX ORDER — ONDANSETRON 4 MG/1
4 TABLET, ORALLY DISINTEGRATING ORAL EVERY 8 HOURS PRN
Qty: 15 TABLET | Refills: 1 | Status: SHIPPED | OUTPATIENT
Start: 2024-04-26

## 2024-04-26 NOTE — TELEPHONE ENCOUNTER
Caller: Karen Copeland    Relationship: Self    Best call back number: [phone]     What medication are you requesting: SOMETHING TO TREAT SYMPTOMS, FAMOTIDINE (PEPCID)     What are your current symptoms: STOMACH PAIN    How long have you been experiencing symptoms: COUPLE OF DAYS    Have you had these symptoms before:    [x] Yes  [] No    Have you been treated for these symptoms before:   [x] Yes  [] No    If a prescription is needed, what is your preferred pharmacy and phone number: [facility] [address] [place] [address] [phone] [facility] [phone]      PATIENT ALSO REQUESTING CALLBACK IF IT IS OK TO TAKE FAMOTIDINE (PEPCID)

## 2024-04-30 DIAGNOSIS — R07.89 CHEST WALL PAIN: ICD-10-CM

## 2024-04-30 NOTE — TELEPHONE ENCOUNTER
Caller: Karen Copeland    Relationship: Self    Best call back number: [phone]      Requested Prescriptions     Pending Prescriptions Disp Refills    traMADol (ULTRAM) 50 MG tablet 30 tablet 0     Sig: Take 1 tablet by mouth Every 8 (Eight) Hours As Needed for Moderate Pain.        Pharmacy where request should be sent: [facility] [address] [place] [address] [phone] [facility] [phone]     Last office visit with prescribing clinician: 3/11/2024   Last telemedicine visit with prescribing clinician: Visit date not found   Next office visit with prescribing clinician: 9/30/2024     Additional details provided by patient:     3 DAYS    Does the patient have less than a 3 day supply:  [x] Yes  [] No    Would you like a call back once the refill request has been completed: [] Yes [] No    If the office needs to give you a call back, can they leave a voicemail: [] Yes [] No    Dora Boo Rep   04/30/24 09:59 CDT

## 2024-05-02 RX ORDER — TRAMADOL HYDROCHLORIDE 50 MG/1
50 TABLET ORAL EVERY 8 HOURS PRN
Qty: 30 TABLET | Refills: 0 | Status: SHIPPED | OUTPATIENT
Start: 2024-05-04

## 2024-05-02 NOTE — TELEPHONE ENCOUNTER
Rx Refill Note  Requested Prescriptions     Pending Prescriptions Disp Refills    traMADol (ULTRAM) 50 MG tablet 30 tablet 0     Sig: Take 1 tablet by mouth Every 8 (Eight) Hours As Needed for Moderate Pain.      Last office visit with prescribing clinician: 3/11/2024   Last telemedicine visit with prescribing clinician: Visit date not found   Next office visit with prescribing clinician: 9/30/2024                         Would you like a call back once the refill request has been completed: [] Yes [] No    If the office needs to give you a call back, can they leave a voicemail: [] Yes [] No    Thalia Carreon RN  05/02/24, 07:06 CDT

## 2024-05-16 ENCOUNTER — OFFICE VISIT (OUTPATIENT)
Dept: CARDIOLOGY | Facility: CLINIC | Age: 69
End: 2024-05-16
Payer: MEDICARE

## 2024-05-16 VITALS
SYSTOLIC BLOOD PRESSURE: 130 MMHG | WEIGHT: 118 LBS | HEART RATE: 92 BPM | HEIGHT: 62 IN | DIASTOLIC BLOOD PRESSURE: 70 MMHG | OXYGEN SATURATION: 99 % | BODY MASS INDEX: 21.71 KG/M2

## 2024-05-16 DIAGNOSIS — R94.39 ABNORMAL STRESS ECHOCARDIOGRAM: Primary | ICD-10-CM

## 2024-05-16 DIAGNOSIS — R07.89 ATYPICAL CHEST PAIN: ICD-10-CM

## 2024-05-16 DIAGNOSIS — Z72.0 TOBACCO ABUSE: ICD-10-CM

## 2024-05-16 DIAGNOSIS — R00.2 PALPITATIONS: ICD-10-CM

## 2024-05-16 PROCEDURE — 93000 ELECTROCARDIOGRAM COMPLETE: CPT | Performed by: INTERNAL MEDICINE

## 2024-05-16 PROCEDURE — 99204 OFFICE O/P NEW MOD 45 MIN: CPT | Performed by: INTERNAL MEDICINE

## 2024-05-16 PROCEDURE — 1159F MED LIST DOCD IN RCRD: CPT | Performed by: INTERNAL MEDICINE

## 2024-05-16 PROCEDURE — 1160F RVW MEDS BY RX/DR IN RCRD: CPT | Performed by: INTERNAL MEDICINE

## 2024-05-16 NOTE — PROGRESS NOTES
Subjective:     Encounter Date:05/16/2024      Patient ID: Karen Copeland is a 68 y.o. female who is here for further evaluation after recent abnormal cardiac stress test.    Referring Provider: Afua Dey APRN  Reason for referral: Abnormal finding on cardiovascular stress test    Chief Complaint: Here for further evaluation after recent stress test    History of Present Illness    This is a 68-year-old female with no prior cardiac history but with a history of hyperlipidemia, tobacco abuse, presenting for further evaluation after an abnormal stress test.  The patient says that she was having some increased stress and anxiety.  She noticed some palpitations at that time and also noticed some chest discomfort. She describes the chest discomfort as sharp at times, other times tightness, usually related to increased stress or anxiety.  Is also experiencing some lightheadedness and dizziness that predominantly correlated with palpitations.  She says that she had quit working about a year ago and says that she was simply bored at home and became more nervous and anxious.  This is when her symptoms started.  She says that she has now decided to go back to work and her symptoms have resolved.  She is no longer experiencing any chest discomfort and is also not experiencing palpitations, lightheadedness, dizziness and has not experienced a syncopal episode.  She denies having significant shortness of breath or dyspnea with exertion.  Unfortunately, she has resumed smoking about a pack every couple of days.  She does note that her cholesterol has been elevated and she is on atorvastatin along with gemfibrozil.  She is tolerating these therapies well.    The following portions of the patient's history were reviewed and updated as appropriate: allergies, current medications, past family history, past medical history, past social history, past surgical history, and problem list.    Past Medical History:    Diagnosis Date    Anxiety     Depression     Hyperlipidemia     Liver enzyme elevation     Sleep apnea      History reviewed. No pertinent surgical history.      Current Outpatient Medications:     aspirin 81 MG tablet, Take 1 tablet by mouth Daily., Disp: , Rfl:     atorvastatin (LIPITOR) 40 MG tablet, Take 1 tablet by mouth once daily, Disp: 90 tablet, Rfl: 0    coenzyme Q10 50 MG capsule capsule, Take 1 capsule by mouth Daily., Disp: , Rfl:     dicyclomine (BENTYL) 10 MG capsule, Take 1 capsule by mouth 4 (Four) Times a Day Before Meals & at Bedtime., Disp: 120 capsule, Rfl: 0    famotidine (PEPCID) 40 MG tablet, Take 1 tablet by mouth Daily., Disp: 30 tablet, Rfl: 0    gemfibrozil (LOPID) 600 MG tablet, Take 1 tablet by mouth 2 (Two) Times a Day Before Meals., Disp: 60 tablet, Rfl: 6    pantoprazole (PROTONIX) 40 MG EC tablet, Take 1 tablet by mouth once daily, Disp: 60 tablet, Rfl: 1    traMADol (ULTRAM) 50 MG tablet, Take 1 tablet by mouth Every 8 (Eight) Hours As Needed for Moderate Pain., Disp: 30 tablet, Rfl: 0    Allergies   Allergen Reactions    Amoxicillin Diarrhea, Nausea Only and Other (See Comments)     Stomach cramping and fatigue    Peanut-Containing Drug Products Shortness Of Breath    Diclofenac Sodium Unknown - High Severity     Flushing and soa     Social History     Tobacco Use    Smoking status: Some Days     Current packs/day: 0.25     Average packs/day: 0.3 packs/day for 20.4 years (5.1 ttl pk-yrs)     Types: Cigarettes     Start date: 1/1/2004     Passive exposure: Current    Smokeless tobacco: Never   Substance Use Topics    Alcohol use: Yes     Alcohol/week: 10.0 standard drinks of alcohol     Types: 10 Glasses of wine per week     Comment: Pt stated she drinks about a 5th     Family History   Problem Relation Age of Onset    Arthritis Mother     Cancer Mother     Hyperlipidemia Mother     Arthritis Sister     Cancer Sister     Lupus Sister     Breast cancer Sister     Lupus Brother      Lupus Maternal Aunt     ADD / ADHD Son      Review of Systems   Constitutional: Negative for diaphoresis and malaise/fatigue.   Cardiovascular:  Positive for chest pain and palpitations. Negative for dyspnea on exertion, leg swelling, orthopnea, paroxysmal nocturnal dyspnea and syncope.   Respiratory:  Negative for cough, shortness of breath and wheezing.    Hematologic/Lymphatic: Negative for bleeding problem. Does not bruise/bleed easily.   Gastrointestinal:  Negative for abdominal pain, nausea and vomiting.   Neurological:  Negative for dizziness, light-headedness and loss of balance.   All other systems reviewed and are negative.        ECG 12 Lead    Date/Time: 5/16/2024 3:13 PM  Performed by: Ethan Marrero MD    Authorized by: Ethan Marrero MD  Comparison: compared with previous ECG from 3/11/2024  Similar to previous ECG  Rhythm: sinus rhythm  Rate: normal  BPM: 85  Conduction: conduction normal  QRS axis: normal  Other findings: non-specific ST-T wave changes, left ventricular hypertrophy and poor R wave progression    Clinical impression: abnormal EKG             Objective:     Vitals reviewed.   Constitutional:       General: Not in acute distress.     Appearance: Normal and healthy appearance. Well-developed. Not toxic-appearing or diaphoretic.   Eyes:      General: Lids are normal.      Extraocular Movements: Extraocular movements intact.      Pupils: Pupils are equal, round, and reactive to light.   HENT:      Head: Normocephalic and atraumatic.      Right Ear: External ear normal.      Left Ear: External ear normal.      Nose: Nose normal.    Mouth/Throat:      Mouth: Mucous membranes are not pale, not dry and not cyanotic.   Neck:      Vascular: No carotid bruit or JVD.      Trachea: No tracheal deviation.   Pulmonary:      Effort: Pulmonary effort is normal. No accessory muscle usage or respiratory distress.      Breath sounds: Normal breath sounds. No wheezing. No rhonchi. No  "rales.   Chest:      Chest wall: Not tender to palpatation.   Cardiovascular:      Normal rate. Regular rhythm.      Murmurs: There is no murmur.      No gallop.    Edema:     Peripheral edema absent.   Abdominal:      General: Bowel sounds are normal. There is no distension or abdominal bruit.      Palpations: Abdomen is soft.      Tenderness: There is no abdominal tenderness.   Musculoskeletal: Normal range of motion.         General: No tenderness or deformity.      Extremities: No clubbing present.Skin:     General: Skin is warm and dry. There is no cyanosis.      Findings: No erythema or rash.   Neurological:      General: No focal deficit present.      Mental Status: Oriented to person, place, and time and oriented to person, place and time.      Cranial Nerves: No cranial nerve deficit.   Psychiatric:         Mood and Affect: Mood normal.         Speech: Speech normal.         Behavior: Behavior normal.       /70   Pulse 92   Ht 157.5 cm (62\")   Wt 53.5 kg (118 lb)   SpO2 99%   BMI 21.58 kg/m²     Data/Lab Review:     Results for orders placed during the hospital encounter of 04/01/24    Adult Stress Echo W/ Cont or Stress Agent if Necessary Per Protocol    Interpretation Summary    Abnormal stress echo consistent with a high risk study for myocardial ischemia.    The following left ventricular wall segments are hypokinetic: basal inferoseptal, basal anterior and basal anteroseptal.    Left ventricular ejection fraction appears to be 56 - 60% at rest.    The exercise ECG demonstrates nonspecific ST segment abnormalities.    Consider further evaluation if clinically indicated.    Lab Results   Component Value Date    CHLPL 212 (H) 04/11/2024    TRIG 351 (H) 04/11/2024    HDL 63 04/11/2024    LDL 92 04/11/2024         Assessment:          Diagnosis Plan   1. Abnormal stress echocardiogram  ECG 12 Lead      2. Atypical chest pain        3. Palpitations        4. Tobacco abuse               Plan:     "   1.  Abnormal stress echocardiogram: The patient was referred here based on the abnormal stress echocardiogram that is noted above.  I looked at the images.  While the basal inferoseptal wall appears to be hypokinetic, it is also hypokinetic at rest.  In addition, the basal anteroseptal wall appears to be mildly hypokinetic at both rest and stress.  These do not necessarily appear to be ischemic segments to me, however.  The downstream myocardium appears to have normal contractility.  Therefore, given the concerns about whether or not the stress test is truly abnormal and the fact that the patient is no longer experiencing any chest discomfort whatsoever, I would not feel strongly about performing any further evaluation.  We did discuss that if the patient develops recurrent symptoms of chest discomfort, we could certainly consider either a CT coronary angiogram or invasive coronary angiography to further evaluate.  However, we did discuss that I do have concerns about the stress test potentially not being as high risk is what was reported and given the lack of symptoms that the patient has at this time, I do not feel that she requires any further workup.  She seemed very relieved with this information also told me that she would feel free to call or return if she develops any recurrent symptoms.    2.  Atypical chest pain: The patient was having chest discomfort with atypical features and also having some palpitations at the time the stress test was ordered.  She describes this as some increase stress and anxiety.  However, the symptoms have now completely resolved.  Therefore, I would not feel that any further workup is indicated based on the discussion above.    3.  Palpitations: The patient is no longer experiencing palpitations but we did discuss that if she develops any symptoms in the future, we could certainly consider cardiac monitoring.    4.  Tobacco abuse: The patient has once again started smoking.  Karen Copeland  reports that she has been smoking cigarettes. She started smoking about 20 years ago. She has a 5.1 pack-year smoking history. She has been exposed to tobacco smoke. She has never used smokeless tobacco. I have educated her on the risk of diseases from using tobacco products such as cancer, COPD, and heart disease. I advised her to quit and she is not willing to quit.   I spent 3  minutes counseling the patient.    At this time, the patient will be seen back as needed but we would be happy to see her again if further cardiac issues or concerns arise.

## 2024-05-28 DIAGNOSIS — E78.00 HIGH CHOLESTEROL: ICD-10-CM

## 2024-05-28 RX ORDER — ATORVASTATIN CALCIUM 40 MG/1
TABLET, FILM COATED ORAL
Qty: 90 TABLET | Refills: 0 | Status: SHIPPED | OUTPATIENT
Start: 2024-05-28

## 2024-05-28 NOTE — TELEPHONE ENCOUNTER
Rx Refill Note  Requested Prescriptions     Pending Prescriptions Disp Refills    atorvastatin (LIPITOR) 40 MG tablet [Pharmacy Med Name: Atorvastatin Calcium 40 MG Oral Tablet] 90 tablet 0     Sig: Take 1 tablet by mouth once daily      Last office visit with prescribing clinician: 3/11/2024   Last telemedicine visit with prescribing clinician: Visit date not found   Next office visit with prescribing clinician: 9/30/2024                         Would you like a call back once the refill request has been completed: [] Yes [] No    If the office needs to give you a call back, can they leave a voicemail: [] Yes [] No    Thalia Carreon RN  05/28/24, 07:18 CDT

## 2024-05-31 DIAGNOSIS — R07.89 CHEST WALL PAIN: ICD-10-CM

## 2024-05-31 RX ORDER — TRAMADOL HYDROCHLORIDE 50 MG/1
50 TABLET ORAL EVERY 8 HOURS PRN
Qty: 30 TABLET | Refills: 0 | Status: SHIPPED | OUTPATIENT
Start: 2024-05-31

## 2024-05-31 NOTE — TELEPHONE ENCOUNTER
Rx Refill Note  Requested Prescriptions     Pending Prescriptions Disp Refills    traMADol (ULTRAM) 50 MG tablet 30 tablet 0     Sig: Take 1 tablet by mouth Every 8 (Eight) Hours As Needed for Moderate Pain.      Last office visit with prescribing clinician: 3/11/2024   Last telemedicine visit with prescribing clinician: Visit date not found   Next office visit with prescribing clinician: 9/30/2024                         Would you like a call back once the refill request has been completed: [] Yes [] No    If the office needs to give you a call back, can they leave a voicemail: [] Yes [] No    Thalia Carreon RN  05/31/24, 10:52 CDT

## 2024-05-31 NOTE — TELEPHONE ENCOUNTER
Caller: Min Karen Elham    Relationship: Self    Best call back number: [phone]     Requested Prescriptions:   Requested Prescriptions     Pending Prescriptions Disp Refills    traMADol (ULTRAM) 50 MG tablet 30 tablet 0     Sig: Take 1 tablet by mouth Every 8 (Eight) Hours As Needed for Moderate Pain.        Pharmacy where request should be sent: [facility] [address] [place] [address] [phone] [facility] [phone] FX     Last office visit with prescribing clinician: 3/11/2024   Last telemedicine visit with prescribing clinician: Visit date not found   Next office visit with prescribing clinician: 9/30/2024     Additional details provided by patient: 3 LEFT    Does the patient have less than a 3 day supply:  [] Yes  [x] No    Would you like a call back once the refill request has been completed: [] Yes [x] No    If the office needs to give you a call back, can they leave a voicemail: [] Yes [x] No    Dora Brush Rep   05/31/24 10:51 CDT

## 2024-06-28 DIAGNOSIS — R07.89 CHEST WALL PAIN: ICD-10-CM

## 2024-06-28 RX ORDER — TRAMADOL HYDROCHLORIDE 50 MG/1
50 TABLET ORAL EVERY 8 HOURS PRN
Qty: 30 TABLET | Refills: 0 | Status: SHIPPED | OUTPATIENT
Start: 2024-06-28

## 2024-06-28 NOTE — TELEPHONE ENCOUNTER
Caller: Copeland Karenvenice Lizarraga    Relationship: Self    Best call back number: [phone]     Requested Prescriptions:   Requested Prescriptions     Pending Prescriptions Disp Refills    traMADol (ULTRAM) 50 MG tablet 30 tablet 0     Sig: Take 1 tablet by mouth Every 8 (Eight) Hours As Needed for Moderate Pain.        Pharmacy where request should be sent: [facility] [address] [place] [address] [phone] [facility] [phone]      Last office visit with prescribing clinician: 3/11/2024   Last telemedicine visit with prescribing clinician: Visit date not found   Next office visit with prescribing clinician: 9/30/2024     Additional details provided by patient:     Does the patient have less than a 3 day supply:  [] Yes  [x] No    Would you like a call back once the refill request has been completed: [] Yes [] No    If the office needs to give you a call back, can they leave a voicemail: [] Yes [] No    Dora Jacinto   06/28/24 13:02 CDT

## 2024-07-24 DIAGNOSIS — R07.89 CHEST WALL PAIN: ICD-10-CM

## 2024-07-24 NOTE — TELEPHONE ENCOUNTER
Caller: Min Karen Elham    Relationship: Self    Best call back number: [phone]     Requested Prescriptions:   Requested Prescriptions     Pending Prescriptions Disp Refills    traMADol (ULTRAM) 50 MG tablet 30 tablet 0     Sig: Take 1 tablet by mouth Every 8 (Eight) Hours As Needed for Moderate Pain.        Pharmacy where request should be sent: [facility] [address] [place] [address] [phone] [facility] [phone] FX     Last office visit with prescribing clinician: 3/11/2024   Last telemedicine visit with prescribing clinician: Visit date not found   Next office visit with prescribing clinician: 9/30/2024     Additional details provided by patient: THREE DAYS LEFT     Does the patient have less than a 3 day supply:  [] Yes  [x] No    Would you like a call back once the refill request has been completed: [] Yes [] No    If the office needs to give you a call back, can they leave a voicemail: [] Yes [] No    Dora Jacinto Rep   07/24/24 08:48 CDT

## 2024-07-25 RX ORDER — TRAMADOL HYDROCHLORIDE 50 MG/1
50 TABLET ORAL EVERY 8 HOURS PRN
Qty: 30 TABLET | Refills: 0 | Status: SHIPPED | OUTPATIENT
Start: 2024-07-26

## 2024-08-09 ENCOUNTER — OFFICE VISIT (OUTPATIENT)
Dept: INTERNAL MEDICINE | Facility: CLINIC | Age: 69
End: 2024-08-09
Payer: MEDICARE

## 2024-08-09 VITALS
BODY MASS INDEX: 20.94 KG/M2 | TEMPERATURE: 96.8 F | SYSTOLIC BLOOD PRESSURE: 145 MMHG | HEIGHT: 62 IN | DIASTOLIC BLOOD PRESSURE: 77 MMHG | HEART RATE: 78 BPM | RESPIRATION RATE: 16 BRPM | WEIGHT: 113.8 LBS

## 2024-08-09 DIAGNOSIS — R05.9 COUGH, UNSPECIFIED TYPE: ICD-10-CM

## 2024-08-09 DIAGNOSIS — J32.9 RHINOSINUSITIS: Primary | ICD-10-CM

## 2024-08-09 LAB
EXPIRATION DATE: NORMAL
FLUAV AG UPPER RESP QL IA.RAPID: NOT DETECTED
FLUBV AG UPPER RESP QL IA.RAPID: NOT DETECTED
INTERNAL CONTROL: NORMAL
Lab: NORMAL
SARS-COV-2 AG UPPER RESP QL IA.RAPID: NOT DETECTED

## 2024-08-09 PROCEDURE — 1125F AMNT PAIN NOTED PAIN PRSNT: CPT

## 2024-08-09 PROCEDURE — 1159F MED LIST DOCD IN RCRD: CPT

## 2024-08-09 PROCEDURE — 87428 SARSCOV & INF VIR A&B AG IA: CPT

## 2024-08-09 PROCEDURE — 99213 OFFICE O/P EST LOW 20 MIN: CPT

## 2024-08-09 PROCEDURE — 1160F RVW MEDS BY RX/DR IN RCRD: CPT

## 2024-08-09 RX ORDER — AZITHROMYCIN 250 MG/1
TABLET, FILM COATED ORAL
Qty: 6 TABLET | Refills: 0 | Status: SHIPPED | OUTPATIENT
Start: 2024-08-09

## 2024-08-09 NOTE — PROGRESS NOTES
Chief Complaint  Headache, Cough (Dry cough, symptoms started Wed), Nausea, and URI    Subjective        Karen Copeland presents to [facility]  History of Present Illness  Patient is a 69-year-old female presenting today with sinus complaints.  First started feeling bad Tuesday (3 days ago). Has had a dry cough. No known fever but has been experiencing chills. Has taken tylenol for aches with mild relief. She reports having zofran for nausea and took yesterday with no relief.     Past Medical History:   Diagnosis Date    Anxiety     Depression     Hyperlipidemia     Liver enzyme elevation     Sleep apnea      History reviewed. No pertinent surgical history.  Social History     Socioeconomic History    Marital status:    Tobacco Use    Smoking status: Some Days     Current packs/day: 0.25     Average packs/day: 0.3 packs/day for 20.6 years (5.2 ttl pk-yrs)     Types: Cigarettes     Start date: 1/1/2004     Passive exposure: Current    Smokeless tobacco: Never   Vaping Use    Vaping status: Never Used   Substance and Sexual Activity    Alcohol use: Yes     Alcohol/week: 10.0 standard drinks of alcohol     Types: 10 Glasses of wine per week     Comment: Pt stated she drinks about a 5th    Drug use: Yes     Types: Marijuana    Sexual activity: Not Currently     Partners: Male     Family History   Problem Relation Age of Onset    Arthritis Mother     Cancer Mother     Hyperlipidemia Mother     Arthritis Sister     Cancer Sister     Lupus Sister     Breast cancer Sister     Lupus Brother     Lupus Maternal Aunt     ADD / ADHD Son        Medications:  Prior to Admission medications    Medication Sig Start Date End Date Taking? Authorizing Provider   aspirin 81 MG tablet Take 1 tablet by mouth Daily.   Yes Provider, MD Moshe   atorvastatin (LIPITOR) 40 MG tablet Take 1 tablet by mouth once daily 5/28/24  Yes Brian Terrell MD   coenzyme Q10 50 MG capsule capsule  "Take 1 capsule by mouth Daily.   Yes Provider, MD Moshe   dicyclomine (BENTYL) 10 MG capsule Take 1 capsule by mouth 4 (Four) Times a Day Before Meals & at Bedtime. 4/26/24  Yes Afua Dey APRN   famotidine (PEPCID) 40 MG tablet Take 1 tablet by mouth Daily. 4/26/24  Yes Afua Dey APRN   gemfibrozil (LOPID) 600 MG tablet Take 1 tablet by mouth 2 (Two) Times a Day Before Meals. 12/4/23  Yes Afua Dey APRN   pantoprazole (PROTONIX) 40 MG EC tablet Take 1 tablet by mouth once daily 4/18/24  Yes Kash Carreon APRN   traMADol (ULTRAM) 50 MG tablet Take 1 tablet by mouth Every 8 (Eight) Hours As Needed for Moderate Pain. 7/26/24  Yes Afua Dey APRN       Review of Systems  Review of systems is negative unless otherwise specified in HPI.    Objective   Vital Signs:  /77 (BP Location: Left arm, Patient Position: Sitting, Cuff Size: Small Adult)   Pulse 78   Temp 96.8 °F (36 °C) (Infrared)   Resp 16   Ht 157.5 cm (62\")   Wt 51.6 kg (113 lb 12.8 oz)   BMI 20.81 kg/m²   Estimated body mass index is 20.81 kg/m² as calculated from the following:    Height as of this encounter: 157.5 cm (62\").    Weight as of this encounter: 51.6 kg (113 lb 12.8 oz).       BMI is within normal parameters. No other follow-up for BMI required.    PHQ-9 Depression Screening  Little interest or pleasure in doing things? 0-->not at all   Feeling down, depressed, or hopeless? 0-->not at all   Trouble falling or staying asleep, or sleeping too much?     Feeling tired or having little energy?     Poor appetite or overeating?     Feeling bad about yourself - or that you are a failure or have let yourself or your family down?     Trouble concentrating on things, such as reading the newspaper or watching television?     Moving or speaking so slowly that other people could have noticed? Or the opposite - being so fidgety or restless that you have been moving around a lot more " than usual?     Thoughts that you would be better off dead, or of hurting yourself in some way?     PHQ-9 Total Score 0   If you checked off any problems, how difficult have these problems made it for you to do your work, take care of things at home, or get along with other people?          Physical Exam  Vitals and nursing note reviewed.   Constitutional:       General: She is not in acute distress.     Appearance: Normal appearance. She is ill-appearing.   HENT:      Head: Normocephalic.      Right Ear: Tympanic membrane normal.      Left Ear: Tympanic membrane normal.      Nose: Congestion and rhinorrhea present.      Mouth/Throat:      Mouth: Mucous membranes are moist.      Pharynx: Posterior oropharyngeal erythema (PND) present.   Eyes:      Pupils: Pupils are equal, round, and reactive to light.   Cardiovascular:      Rate and Rhythm: Normal rate and regular rhythm.      Pulses: Normal pulses.      Heart sounds: Normal heart sounds.   Pulmonary:      Effort: Pulmonary effort is normal. No respiratory distress.      Breath sounds: Normal breath sounds.   Abdominal:      General: Bowel sounds are normal.      Palpations: Abdomen is soft.   Musculoskeletal:         General: Normal range of motion.      Cervical back: Normal range of motion.   Skin:     General: Skin is warm and dry.      Capillary Refill: Capillary refill takes less than 2 seconds.   Neurological:      General: No focal deficit present.      Mental Status: She is alert.          Result Review :  The following data was reviewed by: COLLEEN Arroyo on 08/09/2024:  SARS Antigen   Date Value Ref Range Status   08/09/2024 Not Detected Not Detected, Presumptive Negative Final     Influenza A Antigen SAVITA   Date Value Ref Range Status   08/09/2024 Not Detected Not Detected Final     Influenza B Antigen SAVITA   Date Value Ref Range Status   08/09/2024 Not Detected Not Detected Final                  Assessment and Plan   Diagnoses and all orders for  this visit:    1. Rhinosinusitis (Primary)  -     azithromycin (Zithromax Z-Boaz) 250 MG tablet; Take 2 tablets by mouth on day 1, then 1 tablet daily on days 2-5  Dispense: 6 tablet; Refill: 0    2. Cough, unspecified type  -     POCT SARS-CoV-2 Antigen SAVITA + Flu      - With symptoms persisting will treat with antibiotic at this time.          Follow Up   Return if symptoms worsen or fail to improve.  Patient was given instructions and counseling regarding her condition or for health maintenance advice. Please see specific information pulled into the AVS if appropriate.     Signed by:    COLLEEN Arroyo Date: 08/09/24

## 2024-08-21 ENCOUNTER — TELEPHONE (OUTPATIENT)
Dept: INTERNAL MEDICINE | Facility: CLINIC | Age: 69
End: 2024-08-21
Payer: MEDICARE

## 2024-08-21 DIAGNOSIS — R07.89 CHEST WALL PAIN: ICD-10-CM

## 2024-08-21 NOTE — TELEPHONE ENCOUNTER
Caller: Karen Copeland    Relationship: Self    Best call back number: [phone]     Requested Prescriptions:   Requested Prescriptions     Pending Prescriptions Disp Refills    traMADol (ULTRAM) 50 MG tablet 30 tablet 0     Sig: Take 1 tablet by mouth Every 8 (Eight) Hours As Needed for Moderate Pain.        Pharmacy where request should be sent: [facility] [address] [place] [address] [phone] [facility] [phone]      Last office visit with prescribing clinician: 3/11/2024   Last telemedicine visit with prescribing clinician: Visit date not found   Next office visit with prescribing clinician: 9/30/2024     Additional details provided by patient:     Does the patient have less than a 3 day supply:  [] Yes  [x] No    Would you like a call back once the refill request has been completed: [x] Yes [] No    If the office needs to give you a call back, can they leave a voicemail: [x] Yes [] No    Alexis Van III, RegEstelita Rep   08/21/24 09:34 CDT

## 2024-08-22 RX ORDER — TRAMADOL HYDROCHLORIDE 50 MG/1
50 TABLET ORAL EVERY 8 HOURS PRN
Qty: 30 TABLET | Refills: 0 | Status: SHIPPED | OUTPATIENT
Start: 2024-08-26

## 2024-08-28 DIAGNOSIS — E78.00 HIGH CHOLESTEROL: ICD-10-CM

## 2024-08-29 RX ORDER — ATORVASTATIN CALCIUM 40 MG/1
40 TABLET, FILM COATED ORAL DAILY
Qty: 90 TABLET | Refills: 0 | Status: SHIPPED | OUTPATIENT
Start: 2024-08-29

## 2024-09-18 DIAGNOSIS — R07.89 CHEST WALL PAIN: ICD-10-CM

## 2024-09-18 RX ORDER — TRAMADOL HYDROCHLORIDE 50 MG/1
50 TABLET ORAL EVERY 8 HOURS PRN
Qty: 30 TABLET | Refills: 0 | OUTPATIENT
Start: 2024-09-18

## 2024-09-23 ENCOUNTER — OFFICE VISIT (OUTPATIENT)
Dept: INTERNAL MEDICINE | Facility: CLINIC | Age: 69
End: 2024-09-23
Payer: MEDICARE

## 2024-09-23 VITALS
RESPIRATION RATE: 16 BRPM | HEART RATE: 80 BPM | DIASTOLIC BLOOD PRESSURE: 79 MMHG | WEIGHT: 115.5 LBS | BODY MASS INDEX: 21.25 KG/M2 | TEMPERATURE: 98 F | HEIGHT: 62 IN | OXYGEN SATURATION: 99 % | SYSTOLIC BLOOD PRESSURE: 130 MMHG

## 2024-09-23 DIAGNOSIS — K21.9 GASTROESOPHAGEAL REFLUX DISEASE, UNSPECIFIED WHETHER ESOPHAGITIS PRESENT: ICD-10-CM

## 2024-09-23 DIAGNOSIS — H93.90 EAR LESION: ICD-10-CM

## 2024-09-23 DIAGNOSIS — Z79.899 LONG-TERM USE OF HIGH-RISK MEDICATION: ICD-10-CM

## 2024-09-23 DIAGNOSIS — Z12.31 SCREENING MAMMOGRAM FOR BREAST CANCER: ICD-10-CM

## 2024-09-23 DIAGNOSIS — Z00.00 MEDICARE ANNUAL WELLNESS VISIT, SUBSEQUENT: Primary | ICD-10-CM

## 2024-09-23 DIAGNOSIS — E78.00 PURE HYPERCHOLESTEROLEMIA: ICD-10-CM

## 2024-09-23 PROCEDURE — 99213 OFFICE O/P EST LOW 20 MIN: CPT | Performed by: NURSE PRACTITIONER

## 2024-09-23 PROCEDURE — 1125F AMNT PAIN NOTED PAIN PRSNT: CPT | Performed by: NURSE PRACTITIONER

## 2024-09-23 PROCEDURE — 1159F MED LIST DOCD IN RCRD: CPT | Performed by: NURSE PRACTITIONER

## 2024-09-23 PROCEDURE — 1170F FXNL STATUS ASSESSED: CPT | Performed by: NURSE PRACTITIONER

## 2024-09-23 PROCEDURE — 1160F RVW MEDS BY RX/DR IN RCRD: CPT | Performed by: NURSE PRACTITIONER

## 2024-09-23 PROCEDURE — G0439 PPPS, SUBSEQ VISIT: HCPCS | Performed by: NURSE PRACTITIONER

## 2024-09-23 RX ORDER — PANTOPRAZOLE SODIUM 40 MG/1
40 TABLET, DELAYED RELEASE ORAL DAILY
Qty: 60 TABLET | Refills: 1 | Status: SHIPPED | OUTPATIENT
Start: 2024-09-23

## 2024-09-23 RX ORDER — MUPIROCIN 20 MG/G
1 OINTMENT TOPICAL 2 TIMES DAILY
Qty: 22 G | Refills: 1 | Status: SHIPPED | OUTPATIENT
Start: 2024-09-23

## 2024-09-24 DIAGNOSIS — R07.89 CHEST WALL PAIN: ICD-10-CM

## 2024-09-24 LAB
ALBUMIN SERPL-MCNC: 4.9 G/DL (ref 3.9–4.9)
ALP SERPL-CCNC: 101 IU/L (ref 44–121)
ALT SERPL-CCNC: 14 IU/L (ref 0–32)
AST SERPL-CCNC: 15 IU/L (ref 0–40)
BASOPHILS # BLD AUTO: 0.1 X10E3/UL (ref 0–0.2)
BASOPHILS NFR BLD AUTO: 1 %
BILIRUB SERPL-MCNC: 0.3 MG/DL (ref 0–1.2)
BUN SERPL-MCNC: 13 MG/DL (ref 8–27)
BUN/CREAT SERPL: 24 (ref 12–28)
CALCIUM SERPL-MCNC: 9.8 MG/DL (ref 8.7–10.3)
CHLORIDE SERPL-SCNC: 104 MMOL/L (ref 96–106)
CHOLEST SERPL-MCNC: 193 MG/DL (ref 100–199)
CO2 SERPL-SCNC: 22 MMOL/L (ref 20–29)
CREAT SERPL-MCNC: 0.55 MG/DL (ref 0.57–1)
EGFRCR SERPLBLD CKD-EPI 2021: 99 ML/MIN/1.73
EOSINOPHIL # BLD AUTO: 0.1 X10E3/UL (ref 0–0.4)
EOSINOPHIL NFR BLD AUTO: 2 %
ERYTHROCYTE [DISTWIDTH] IN BLOOD BY AUTOMATED COUNT: 13.2 % (ref 11.7–15.4)
GLOBULIN SER CALC-MCNC: 2.1 G/DL (ref 1.5–4.5)
GLUCOSE SERPL-MCNC: 96 MG/DL (ref 70–99)
HCT VFR BLD AUTO: 38.4 % (ref 34–46.6)
HDLC SERPL-MCNC: 72 MG/DL
HGB BLD-MCNC: 12.3 G/DL (ref 11.1–15.9)
IMM GRANULOCYTES # BLD AUTO: 0 X10E3/UL (ref 0–0.1)
IMM GRANULOCYTES NFR BLD AUTO: 0 %
LDLC SERPL CALC-MCNC: 92 MG/DL (ref 0–99)
LYMPHOCYTES # BLD AUTO: 1.7 X10E3/UL (ref 0.7–3.1)
LYMPHOCYTES NFR BLD AUTO: 34 %
MCH RBC QN AUTO: 30.8 PG (ref 26.6–33)
MCHC RBC AUTO-ENTMCNC: 32 G/DL (ref 31.5–35.7)
MCV RBC AUTO: 96 FL (ref 79–97)
MONOCYTES # BLD AUTO: 0.4 X10E3/UL (ref 0.1–0.9)
MONOCYTES NFR BLD AUTO: 8 %
NEUTROPHILS # BLD AUTO: 2.7 X10E3/UL (ref 1.4–7)
NEUTROPHILS NFR BLD AUTO: 55 %
PLATELET # BLD AUTO: 284 X10E3/UL (ref 150–450)
POTASSIUM SERPL-SCNC: 4.3 MMOL/L (ref 3.5–5.2)
PROT SERPL-MCNC: 7 G/DL (ref 6–8.5)
RBC # BLD AUTO: 4 X10E6/UL (ref 3.77–5.28)
SODIUM SERPL-SCNC: 143 MMOL/L (ref 134–144)
TRIGL SERPL-MCNC: 173 MG/DL (ref 0–149)
VLDLC SERPL CALC-MCNC: 29 MG/DL (ref 5–40)
WBC # BLD AUTO: 5 X10E3/UL (ref 3.4–10.8)

## 2024-09-24 RX ORDER — TRAMADOL HYDROCHLORIDE 50 MG/1
50 TABLET ORAL EVERY 8 HOURS PRN
Qty: 30 TABLET | Refills: 0 | Status: SHIPPED | OUTPATIENT
Start: 2024-09-24

## 2024-09-26 ENCOUNTER — EXTERNAL PBMM DATA (OUTPATIENT)
Dept: PHARMACY | Facility: OTHER | Age: 69
End: 2024-09-26
Payer: MEDICARE

## 2024-09-27 LAB — DRUGS UR: NORMAL

## 2024-10-11 ENCOUNTER — OFFICE VISIT (OUTPATIENT)
Dept: INTERNAL MEDICINE | Facility: CLINIC | Age: 69
End: 2024-10-11
Payer: MEDICARE

## 2024-10-11 VITALS
RESPIRATION RATE: 19 BRPM | BODY MASS INDEX: 20.98 KG/M2 | OXYGEN SATURATION: 98 % | DIASTOLIC BLOOD PRESSURE: 81 MMHG | WEIGHT: 114 LBS | TEMPERATURE: 98 F | HEIGHT: 62 IN | SYSTOLIC BLOOD PRESSURE: 137 MMHG | HEART RATE: 91 BPM

## 2024-10-11 DIAGNOSIS — R21 RASH AND NONSPECIFIC SKIN ERUPTION: ICD-10-CM

## 2024-10-11 DIAGNOSIS — H69.92 ACUTE DYSFUNCTION OF LEFT EUSTACHIAN TUBE: Primary | ICD-10-CM

## 2024-10-11 PROCEDURE — 1125F AMNT PAIN NOTED PAIN PRSNT: CPT

## 2024-10-11 PROCEDURE — 99214 OFFICE O/P EST MOD 30 MIN: CPT

## 2024-10-11 RX ORDER — DOXYCYCLINE 100 MG/1
100 CAPSULE ORAL 2 TIMES DAILY
Qty: 20 CAPSULE | Refills: 0 | Status: SHIPPED | OUTPATIENT
Start: 2024-10-11 | End: 2024-10-21

## 2024-10-11 RX ORDER — FLUTICASONE PROPIONATE 50 UG/1
2 SPRAY, METERED NASAL DAILY
Qty: 11.1 ML | Refills: 0 | Status: SHIPPED | OUTPATIENT
Start: 2024-10-11

## 2024-10-11 RX ORDER — MECLIZINE HCL 12.5 MG 12.5 MG/1
12.5 TABLET ORAL 3 TIMES DAILY PRN
Qty: 30 TABLET | Refills: 0 | Status: SHIPPED | OUTPATIENT
Start: 2024-10-11

## 2024-10-11 RX ORDER — METHYLPREDNISOLONE 4 MG
TABLET, DOSE PACK ORAL
Qty: 21 TABLET | Refills: 0 | Status: SHIPPED | OUTPATIENT
Start: 2024-10-11

## 2024-10-11 NOTE — PROGRESS NOTES
"        Subjective     Chief Complaint   Patient presents with    Dizziness       Dizziness  Associated symptoms include fatigue. Pertinent negatives include no fever.     History of Present Illness  Patient presents to clinic for dizziness starting yesterday. Describes dizziness as \"spinning\".  Denies any nausea or vomiting. Denies any head injuries or fall. Did noticed drainage from left ear yesterday. Reports a \"soreness\" in left ear. Started to notice it at 5am. Needed help from  to make it into the bathroom. Then went back to sleep to  if that would help. It didn't. Took a meclizine once which did noticeably decrease the dizziness but never went away fully. Today's dizziness is better than yesterday. Still having trouble with stability when walking.       The patient presents for evaluation of multiple medical concerns.    She has been dealing with a persistent bump behind her ear for over a month, which is constantly itchy. Despite using Bactroban ointment twice daily as prescribed, there has been no improvement. She was informed that the bump could be a staph infection due to scratching. She has not sought dermatological advice for this issue.    She began experiencing dizziness yesterday morning, which has somewhat improved with meclizine. The dizziness is described as a spinning sensation. She had a severe episode yesterday, almost falling out of bed, but reports no nausea or vomiting. She also reports no falls or head injuries. She noticed some dampness in her left ear yesterday, which she reports as being sore. She does not use Q-tips and does not feel ill.    Patient's PMR from outside medical facility reviewed and noted.    Review of Systems   Constitutional:  Positive for fatigue. Negative for fever.   HENT:  Positive for hearing loss. Negative for ear pain.         Hx of left ear internal scars from Scarlet Fever from childhood.    Neurological:  Positive for dizziness.        Otherwise " complete ROS reviewed and negative except as mentioned in the HPI.    Past Medical History:   Past Medical History:   Diagnosis Date    Anxiety     Depression     Hyperlipidemia     Liver enzyme elevation     Sleep apnea      Past Surgical History:No past surgical history on file.  Social History:  reports that she has been smoking cigarettes. She started smoking about 20 years ago. She has a 5.2 pack-year smoking history. She has been exposed to tobacco smoke. She has never used smokeless tobacco. She reports current alcohol use of about 10.0 standard drinks of alcohol per week. She reports current drug use. Drug: Marijuana.    Family History: family history includes ADD / ADHD in her son; Arthritis in her mother and sister; Breast cancer in her sister; Cancer in her mother and sister; Hyperlipidemia in her mother; Lupus in her brother, maternal aunt, and sister.      Allergies:  Allergies   Allergen Reactions    Amoxicillin Diarrhea, Nausea Only and Other (See Comments)     Stomach cramping and fatigue    Peanut-Containing Drug Products Shortness Of Breath    Diclofenac Sodium Unknown - High Severity     Flushing and soa     Medications:  Prior to Admission medications    Medication Sig Start Date End Date Taking? Authorizing Provider   aspirin 81 MG tablet Take 1 tablet by mouth Daily.    Provider, Moshe, MD   atorvastatin (LIPITOR) 40 MG tablet Take 1 tablet by mouth Daily. 8/29/24   Afua Dey APRN   coenzyme Q10 50 MG capsule capsule Take 1 capsule by mouth Daily.    Provider, MD Moshe   dicyclomine (BENTYL) 10 MG capsule Take 1 capsule by mouth 4 (Four) Times a Day Before Meals & at Bedtime. 4/26/24   Afua Dey APRN   famotidine (PEPCID) 40 MG tablet Take 1 tablet by mouth Daily. 4/26/24   Afua Dey APRN   gemfibrozil (LOPID) 600 MG tablet Take 1 tablet by mouth 2 (Two) Times a Day Before Meals. 12/4/23   Afua Dey APRN   mupirocin  "(BACTROBAN) 2 % ointment Apply 1 Application topically to the appropriate area as directed 2 (Two) Times a Day. 9/23/24   Afua Dey APRN   pantoprazole (PROTONIX) 40 MG EC tablet Take 1 tablet by mouth Daily. 9/23/24   Afua Dey APRN   traMADol (ULTRAM) 50 MG tablet Take 1 tablet by mouth Every 8 (Eight) Hours As Needed for Moderate Pain. 9/24/24   Afua Dey APRN       RICKY:        PHQ-9 Depression Screening  Little interest or pleasure in doing things?     Feeling down, depressed, or hopeless?     Trouble falling or staying asleep, or sleeping too much?     Feeling tired or having little energy?     Poor appetite or overeating?     Feeling bad about yourself - or that you are a failure or have let yourself or your family down?     Trouble concentrating on things, such as reading the newspaper or watching television?     Moving or speaking so slowly that other people could have noticed? Or the opposite - being so fidgety or restless that you have been moving around a lot more than usual?     Thoughts that you would be better off dead, or of hurting yourself in some way?     PHQ-9 Total Score     If you checked off any problems, how difficult have these problems made it for you to do your work, take care of things at home, or get along with other people?           Objective     Vital Signs: /81   Pulse 91   Temp 98 °F (36.7 °C)   Resp 19   Ht 157.5 cm (62\")   Wt 51.7 kg (114 lb)   SpO2 98%   BMI 20.85 kg/m²   Physical Exam  Vitals and nursing note reviewed.   Constitutional:       Appearance: Normal appearance.   HENT:      Head: Normocephalic.      Right Ear: Tympanic membrane, ear canal and external ear normal.      Ears:      Comments: Left ear TM bulging     Nose: Nose normal.      Mouth/Throat:      Mouth: Mucous membranes are moist.      Pharynx: No posterior oropharyngeal erythema.   Cardiovascular:      Rate and Rhythm: Normal rate and regular rhythm. "      Pulses: Normal pulses.      Heart sounds: Normal heart sounds.   Pulmonary:      Effort: Pulmonary effort is normal.      Breath sounds: Normal breath sounds.   Skin:     General: Skin is warm.      Comments: Two lesions noted behind left ear.    Neurological:      General: No focal deficit present.      Mental Status: She is alert and oriented to person, place, and time.   Psychiatric:         Mood and Affect: Mood normal.         Behavior: Behavior normal.         Thought Content: Thought content normal.         Judgment: Judgment normal.         BMI is within normal parameters. No other follow-up for BMI required.      Advance Care Planning   ACP discussion was held with the patient during this visit.         Results Reviewed:  Glucose   Date Value Ref Range Status   09/23/2024 96 70 - 99 mg/dL Final   10/21/2019 106 74 - 109 mg/dL Final     BUN   Date Value Ref Range Status   09/23/2024 13 8 - 27 mg/dL Final   10/21/2019 11 8 - 23 mg/dL Final     Creatinine   Date Value Ref Range Status   09/23/2024 0.55 (L) 0.57 - 1.00 mg/dL Final   10/21/2019 0.5 0.5 - 0.9 mg/dL Final     Sodium   Date Value Ref Range Status   09/23/2024 143 134 - 144 mmol/L Final   10/21/2019 141 136 - 145 mmol/L Final     Potassium   Date Value Ref Range Status   09/23/2024 4.3 3.5 - 5.2 mmol/L Final   10/21/2019 3.7 3.5 - 5.0 mmol/L Final     Chloride   Date Value Ref Range Status   09/23/2024 104 96 - 106 mmol/L Final   10/21/2019 102 98 - 111 mmol/L Final     CO2   Date Value Ref Range Status   10/21/2019 20 (L) 22 - 29 mmol/L Final     Total CO2   Date Value Ref Range Status   09/23/2024 22 20 - 29 mmol/L Final     Calcium   Date Value Ref Range Status   09/23/2024 9.8 8.7 - 10.3 mg/dL Final   10/21/2019 9.5 8.8 - 10.2 mg/dL Final     ALT (SGPT)   Date Value Ref Range Status   09/23/2024 14 0 - 32 IU/L Final   11/19/2019 35 (H) 5 - 33 U/L Final     AST (SGOT)   Date Value Ref Range Status   09/23/2024 15 0 - 40 IU/L Final    11/19/2019 28 5 - 32 U/L Final     WBC   Date Value Ref Range Status   09/23/2024 5.0 3.4 - 10.8 x10E3/uL Final   10/21/2019 6.2 4.8 - 10.8 K/uL Final     Hematocrit   Date Value Ref Range Status   09/23/2024 38.4 34.0 - 46.6 % Final   10/21/2019 45.7 37.0 - 47.0 % Final     Platelets   Date Value Ref Range Status   09/23/2024 284 150 - 450 x10E3/uL Final   10/21/2019 250 130 - 400 K/uL Final     Triglycerides   Date Value Ref Range Status   09/23/2024 173 (H) 0 - 149 mg/dL Final   10/21/2019 524 (H) 0 - 149 mg/dL Final     HDL Cholesterol   Date Value Ref Range Status   09/23/2024 72 >39 mg/dL Final   10/21/2019 75 65 - 121 mg/dL Final     Comment:     VALUES>60 MG/DL ARE ASSOCIATED WITH A DECREASED RISK OF  ATHEROSCLEROTIC CARDIOVASCULAR DISEASE     LDL Cholesterol    Date Value Ref Range Status   10/21/2019 see below <100 mg/dL Final     Comment:     When the triglyceride is >400 mg/dL the calculated LDL and  VLDL are not valid.  <100 MG/DL=OPITIMAL    100-129 MG/DL=DESIRABLE    130-159 MG/DL BORDERLINE=INCREASED RISK OF ATHEROSCLEROTIC  CARDIOVASCULAR DISEASE    > OR = 160 MG/DL=ASSOCIATED WITH AN INCREASE RISK OF  ATHEROSCLEROTIC CARDIOVASCULAR DISEASE   10/21/2019 100 <100 mg/dL Final     Comment:     <100 MG/DL=OPITIMAL    100-129 MG/DL=DESIRABLE    130-159 MG/DL BORDERLINE=INCREASED RISK OF ATHEROSCLEROTIC  CARDIOVASCULAR DISEASE    > OR = 160 MG/DL=ASSOCIATED WITH AN INCREASE RISK OF  ATHEROSCLEROTIC CARDIOVASCULAR DISEASE     LDL Chol Calc (NIH)   Date Value Ref Range Status   09/23/2024 92 0 - 99 mg/dL Final     Hemoglobin A1C   Date Value Ref Range Status   12/03/2021 5.1 % Final         Assessment / Plan     Assessment/Plan:    1. Acute dysfunction of left eustachian tube  - methylPREDNISolone (MEDROL) 4 MG dose pack; Take as directed on package instructions.  Dispense: 21 tablet; Refill: 0  - fluticasone (FLONASE) 50 MCG/ACT nasal spray; Administer 2 sprays into the nostril(s) as directed by provider  Daily.  Dispense: 11.1 mL; Refill: 0  - meclizine (ANTIVERT) 12.5 MG tablet; Take 1 tablet by mouth 3 (Three) Times a Day As Needed for Dizziness.  Dispense: 30 tablet; Refill: 0    2. Rash and nonspecific skin eruption  - doxycycline (VIBRAMYCIN) 100 MG capsule; Take 1 capsule by mouth 2 (Two) Times a Day for 10 days.  Dispense: 20 capsule; Refill: 0    Diagnoses and all orders for this visit:    1. Acute dysfunction of left eustachian tube (Primary)  -     methylPREDNISolone (MEDROL) 4 MG dose pack; Take as directed on package instructions.  Dispense: 21 tablet; Refill: 0  -     fluticasone (FLONASE) 50 MCG/ACT nasal spray; Administer 2 sprays into the nostril(s) as directed by provider Daily.  Dispense: 11.1 mL; Refill: 0  -     meclizine (ANTIVERT) 12.5 MG tablet; Take 1 tablet by mouth 3 (Three) Times a Day As Needed for Dizziness.  Dispense: 30 tablet; Refill: 0    2. Rash and nonspecific skin eruption  -     doxycycline (VIBRAMYCIN) 100 MG capsule; Take 1 capsule by mouth 2 (Two) Times a Day for 10 days.  Dispense: 20 capsule; Refill: 0        Assessment & Plan  1. Dermatological condition.  She has had a bump behind her ear for over a month, which has not improved with the use of Bactroban ointment. It was suggested that the bump might be a staph infection from scratching. A referral to a dermatologist was recommended for further evaluation. Doxycycline was prescribed to manage the current symptoms.    2. Dizziness.  The dizziness started yesterday morning and is described as a spinning sensation. It is likely due to fluid accumulation in the middle ear, causing a bulging effect. There is some drainage in the left ear canal. A steroid pack was prescribed along with meclizine to alleviate the dizziness. An intranasal steroid spray was also provided to help dry up the fluid. She was advised against driving until the dizziness subsides. She was also advised to take time off work if needed and a doctor's excuse  was provided.      No follow-ups on file. unless patient needs to be seen sooner or acute issues arise.      I have discussed the patient results/orders and and plan/recommendation with them at today's visit.    Patient or patient representative verbalized consent for the use of Ambient Listening during the visit with  COLLEEN Ennis for chart documentation. 10/11/2024  11:03 CDT  COLLEEN Ennis   10/11/2024

## 2024-10-11 NOTE — Clinical Note
October 11, 2024     Patient: Karen Copeland   YOB: 1955   Date of Visit: 10/11/2024       To Whom It May Concern:    It is my medical opinion that Karen Copeland may return to work in two days.         Sincerely,        COLLEEN Ennis    CC: No Recipients

## 2024-10-11 NOTE — Clinical Note
October 11, 2024     Patient: Karen Copeland   YOB: 1955   Date of Visit: 10/11/2024       To Whom It May Concern:    It is my medical opinion that Karen Copeland may return to work in one day.            Sincerely,        COLLEEN Ennis    CC: No Recipients

## 2024-10-11 NOTE — LETTER
October 11, 2024     Patient: Karen Copeland   YOB: 1955   Date of Visit: 10/11/2024       To Whom It May Concern:    It is my medical opinion that Karen Copeland may return to work on 10/13/2024 .           Sincerely,        COLLEEN Ennis    CC:   No Recipients

## 2024-10-21 DIAGNOSIS — R07.89 CHEST WALL PAIN: ICD-10-CM

## 2024-10-21 RX ORDER — TRAMADOL HYDROCHLORIDE 50 MG/1
50 TABLET ORAL EVERY 8 HOURS PRN
Qty: 30 TABLET | Refills: 0 | Status: SHIPPED | OUTPATIENT
Start: 2024-10-24

## 2024-10-21 NOTE — TELEPHONE ENCOUNTER
Rx Refill Note  Requested Prescriptions     Pending Prescriptions Disp Refills    traMADol (ULTRAM) 50 MG tablet 30 tablet 0     Sig: Take 1 tablet by mouth Every 8 (Eight) Hours As Needed for Moderate Pain.      Last office visit with prescribing clinician: 9/23/2024   Last telemedicine visit with prescribing clinician: Visit date not found   Next office visit with prescribing clinician: 12/30/2024                         Would you like a call back once the refill request has been completed: [] Yes [] No    If the office needs to give you a call back, can they leave a voicemail: [] Yes [] No    Thalia Carreon RN  10/21/24, 12:05 CDT

## 2024-10-21 NOTE — TELEPHONE ENCOUNTER
Caller: Min Karenvenice Lizarraga    Relationship: Self    Best call back number: [phone]    Requested Prescriptions:   Requested Prescriptions     Pending Prescriptions Disp Refills    traMADol (ULTRAM) 50 MG tablet 30 tablet 0     Sig: Take 1 tablet by mouth Every 8 (Eight) Hours As Needed for Moderate Pain.        Pharmacy where request should be sent: [facility] [address] [place] [address] [phone] [facility] [phone] [facility]     Last office visit with prescribing clinician: 9/23/2024   Last telemedicine visit with prescribing clinician: Visit date not found   Next office visit with prescribing clinician: 12/30/2024     Does the patient have less than a 3 day supply:  [x] Yes  [] No    Would you like a call back once the refill request has been completed: [] Yes [] No    If the office needs to give you a call back, can they leave a voicemail: [] Yes [] No    Dora Padron Rep   10/21/24 11:43 CDT

## 2024-11-01 ENCOUNTER — POP HEALTH PHARMACY (OUTPATIENT)
Dept: PHARMACY | Facility: OTHER | Age: 69
End: 2024-11-01
Payer: MEDICARE

## 2024-11-03 LAB
NCCN CRITERIA FLAG: NORMAL
TYRER CUZICK SCORE: 4.4

## 2024-11-07 ENCOUNTER — OFFICE VISIT (OUTPATIENT)
Dept: INTERNAL MEDICINE | Facility: CLINIC | Age: 69
End: 2024-11-07
Payer: MEDICARE

## 2024-11-07 VITALS
HEART RATE: 87 BPM | OXYGEN SATURATION: 99 % | TEMPERATURE: 97.4 F | HEIGHT: 62 IN | WEIGHT: 117 LBS | SYSTOLIC BLOOD PRESSURE: 148 MMHG | BODY MASS INDEX: 21.53 KG/M2 | DIASTOLIC BLOOD PRESSURE: 80 MMHG | RESPIRATION RATE: 19 BRPM

## 2024-11-07 DIAGNOSIS — H93.90 EAR LESION: ICD-10-CM

## 2024-11-07 DIAGNOSIS — R42 DIZZINESS: Primary | ICD-10-CM

## 2024-11-07 PROCEDURE — 1125F AMNT PAIN NOTED PAIN PRSNT: CPT | Performed by: NURSE PRACTITIONER

## 2024-11-07 PROCEDURE — 99213 OFFICE O/P EST LOW 20 MIN: CPT | Performed by: NURSE PRACTITIONER

## 2024-11-07 NOTE — PROGRESS NOTES
Subjective     Chief Complaint   Patient presents with    Dizziness    Ear Problem     SPOT BEHIND EAR NOT HEALED       History of Present Illness  The patient is a 69-year-old female who presents for evaluation of a spot on her ear and dizziness.    She reports experiencing itching and burning sensations in her ear. She has been dealing with a persistent bump behind her ear for the past month. Despite using Bactroban, there has been no improvement. She also mentions that her left ear is causing her discomfort.    She continues to experience dizziness. She took meclizine, which provided some relief. However, she notes that meclizine tends to upset her stomach. She also mentions that she consumes a significant amount of coffee.    Otherwise complete ROS reviewed and negative except as mentioned in the HPI.    Past Medical History:   Past Medical History:   Diagnosis Date    Anxiety     Depression     Hyperlipidemia     Liver enzyme elevation     Sleep apnea      Past Surgical History:No past surgical history on file.  Social History:  reports that she has been smoking cigarettes. She started smoking about 20 years ago. She has a 5.2 pack-year smoking history. She has been exposed to tobacco smoke. She has never used smokeless tobacco. She reports current alcohol use of about 10.0 standard drinks of alcohol per week. She reports current drug use. Drug: Marijuana.    Family History: family history includes ADD / ADHD in her son; Arthritis in her mother and sister; Breast cancer in her sister; Cancer in her mother and sister; Hyperlipidemia in her mother; Lupus in her brother, maternal aunt, and sister.       Allergies:  Allergies   Allergen Reactions    Amoxicillin Diarrhea, Nausea Only and Other (See Comments)     Stomach cramping and fatigue    Peanut-Containing Drug Products Shortness Of Breath    Diclofenac Sodium Unknown - High Severity     Flushing and soa     Medications:  Prior to Admission medications   Group Topic: BH Therapeutic Activity    Date: 8/12/2020  Start Time: 1500  End Time: 1285  Facilitators: Sophia Gagnon    Focus: Mindfulness  Number in attendance: 8    Pt was offered mindfulness activity to promote relaxation.     Method: Group  Attendance: Present  Participation: Moderate  Patient Response: Appeared distracted and Easily frustrated  Mood: Anxious  Affect: Type: Anxious   Range: Blunted/flat   Congruency: Congruent   Stability: Stable  Behavior/Socialization: Guarded  Thought Process: Tracking  Task Performance: Requires redirection  Patient Evaluation: Encouragement - needs prompts     Pt was reminded multiple times to keep his mask on during group. Pt kept to himself.    Sophia Gagnon    "  Medication Sig Start Date End Date Taking? Authorizing Provider   aspirin 81 MG tablet Take 1 tablet by mouth Daily.    Provider, MD Moshe   atorvastatin (LIPITOR) 40 MG tablet Take 1 tablet by mouth Daily. 8/29/24   Afua Dey APRN   coenzyme Q10 50 MG capsule capsule Take 1 capsule by mouth Daily.    Provider, MD Moshe   dicyclomine (BENTYL) 10 MG capsule Take 1 capsule by mouth 4 (Four) Times a Day Before Meals & at Bedtime. 4/26/24   Afua Dey APRN   famotidine (PEPCID) 40 MG tablet Take 1 tablet by mouth Daily. 4/26/24   Afua Dey APRN   fluticasone (FLONASE) 50 MCG/ACT nasal spray Administer 2 sprays into the nostril(s) as directed by provider Daily. 10/11/24   Suly Ingram APRN   gemfibrozil (LOPID) 600 MG tablet Take 1 tablet by mouth 2 (Two) Times a Day Before Meals. 12/4/23   Afua Dey APRN   meclizine (ANTIVERT) 12.5 MG tablet Take 1 tablet by mouth 3 (Three) Times a Day As Needed for Dizziness. 10/11/24   Suly Ingram APRN   methylPREDNISolone (MEDROL) 4 MG dose pack Take as directed on package instructions. 10/11/24   Suly Ingram APRN   mupirocin (BACTROBAN) 2 % ointment Apply 1 Application topically to the appropriate area as directed 2 (Two) Times a Day. 9/23/24   Afua Dey APRN   pantoprazole (PROTONIX) 40 MG EC tablet Take 1 tablet by mouth Daily. 9/23/24   Afua Dey APRN   traMADol (ULTRAM) 50 MG tablet Take 1 tablet by mouth Every 8 (Eight) Hours As Needed for Moderate Pain. 10/24/24   Afua Dey APRN       Objective     Vital Signs: /80   Pulse 87   Temp 97.4 °F (36.3 °C)   Resp 19   Ht 157.5 cm (62\")   Wt 53.1 kg (117 lb)   SpO2 99%   BMI 21.40 kg/m²     Physical Exam    Physical Exam  Vitals reviewed.   Constitutional:       Appearance: Normal appearance. She is well-developed.   HENT:      Head: Normocephalic and atraumatic.   Eyes:      Pupils: " Pupils are equal, round, and reactive to light.   Neck:      Vascular: No JVD.   Cardiovascular:      Rate and Rhythm: Normal rate and regular rhythm.   Pulmonary:      Effort: Pulmonary effort is normal.   Abdominal:      General: Bowel sounds are normal.      Palpations: Abdomen is soft.   Musculoskeletal:         General: No deformity.      Cervical back: Normal range of motion and neck supple.   Lymphadenopathy:      Cervical: No cervical adenopathy.   Skin:     General: Skin is warm and dry.      Findings: Lesion (behind left ear with irregular border. blanceable.) present.   Neurological:      Mental Status: She is alert and oriented to person, place, and time.   Psychiatric:         Behavior: Behavior normal.         Thought Content: Thought content normal.         Judgment: Judgment normal.           BMI is within normal parameters. No other follow-up for BMI required.    Results Reviewed:  Glucose   Date Value Ref Range Status   09/23/2024 96 70 - 99 mg/dL Final   10/21/2019 106 74 - 109 mg/dL Final     BUN   Date Value Ref Range Status   09/23/2024 13 8 - 27 mg/dL Final   10/21/2019 11 8 - 23 mg/dL Final     Creatinine   Date Value Ref Range Status   09/23/2024 0.55 (L) 0.57 - 1.00 mg/dL Final   10/21/2019 0.5 0.5 - 0.9 mg/dL Final     Sodium   Date Value Ref Range Status   09/23/2024 143 134 - 144 mmol/L Final   10/21/2019 141 136 - 145 mmol/L Final     Potassium   Date Value Ref Range Status   09/23/2024 4.3 3.5 - 5.2 mmol/L Final   10/21/2019 3.7 3.5 - 5.0 mmol/L Final     Chloride   Date Value Ref Range Status   09/23/2024 104 96 - 106 mmol/L Final   10/21/2019 102 98 - 111 mmol/L Final     CO2   Date Value Ref Range Status   10/21/2019 20 (L) 22 - 29 mmol/L Final     Total CO2   Date Value Ref Range Status   09/23/2024 22 20 - 29 mmol/L Final     Calcium   Date Value Ref Range Status   09/23/2024 9.8 8.7 - 10.3 mg/dL Final   10/21/2019 9.5 8.8 - 10.2 mg/dL Final     ALT (SGPT)   Date Value Ref Range  Status   09/23/2024 14 0 - 32 IU/L Final   11/19/2019 35 (H) 5 - 33 U/L Final     AST (SGOT)   Date Value Ref Range Status   09/23/2024 15 0 - 40 IU/L Final   11/19/2019 28 5 - 32 U/L Final     WBC   Date Value Ref Range Status   09/23/2024 5.0 3.4 - 10.8 x10E3/uL Final   10/21/2019 6.2 4.8 - 10.8 K/uL Final     Hematocrit   Date Value Ref Range Status   09/23/2024 38.4 34.0 - 46.6 % Final   10/21/2019 45.7 37.0 - 47.0 % Final     Platelets   Date Value Ref Range Status   09/23/2024 284 150 - 450 x10E3/uL Final   10/21/2019 250 130 - 400 K/uL Final     Triglycerides   Date Value Ref Range Status   09/23/2024 173 (H) 0 - 149 mg/dL Final   10/21/2019 524 (H) 0 - 149 mg/dL Final     HDL Cholesterol   Date Value Ref Range Status   09/23/2024 72 >39 mg/dL Final   10/21/2019 75 65 - 121 mg/dL Final     Comment:     VALUES>60 MG/DL ARE ASSOCIATED WITH A DECREASED RISK OF  ATHEROSCLEROTIC CARDIOVASCULAR DISEASE     LDL Cholesterol    Date Value Ref Range Status   10/21/2019 see below <100 mg/dL Final     Comment:     When the triglyceride is >400 mg/dL the calculated LDL and  VLDL are not valid.  <100 MG/DL=OPITIMAL    100-129 MG/DL=DESIRABLE    130-159 MG/DL BORDERLINE=INCREASED RISK OF ATHEROSCLEROTIC  CARDIOVASCULAR DISEASE    > OR = 160 MG/DL=ASSOCIATED WITH AN INCREASE RISK OF  ATHEROSCLEROTIC CARDIOVASCULAR DISEASE   10/21/2019 100 <100 mg/dL Final     Comment:     <100 MG/DL=OPITIMAL    100-129 MG/DL=DESIRABLE    130-159 MG/DL BORDERLINE=INCREASED RISK OF ATHEROSCLEROTIC  CARDIOVASCULAR DISEASE    > OR = 160 MG/DL=ASSOCIATED WITH AN INCREASE RISK OF  ATHEROSCLEROTIC CARDIOVASCULAR DISEASE     LDL Chol Calc ([facility])   Date Value Ref Range Status   09/23/2024 92 0 - 99 mg/dL Final     Hemoglobin A1C   Date Value Ref Range Status   12/03/2021 5.1 % Final       Results      Assessment / Plan     Assessment/Plan:  Diagnoses and all orders for this visit:    1. Dizziness (Primary)    2. Ear lesion        Assessment & Plan  1.  Left ear lesion.  The lesion on her left ear appears to be skin cancer, which is why it has not improved. She is advised to call a dermatologist for further evaluation and treatment. She should mention that the lesion is growing and bothersome and try to schedule an appointment on the same day as her 's upcoming dermatology visit.    2. Dizziness.  She continues to experience dizziness. Meclizine has been effective in managing her symptoms. She can take meclizine as needed and is advised to increase her intake of non-caffeinated fluids, particularly water. She can also use Flonase twice a day as it will not cause any harm.      No follow-ups on file. unless patient needs to be seen sooner or acute issues arise.    Code Status: Full.     Patient or patient representative verbalized consent for the use of Ambient Listening during the visit with  COLLEEN Patton for chart documentation. 11/7/2024  16:28 CST    I have discussed the patient results/orders and and plan/recommendation with them at today's visit.      Signed by:    COLLEEN Patton Date: 11/07/24     DISPLAY PLAN FREE TEXT

## 2024-11-20 DIAGNOSIS — R07.89 CHEST WALL PAIN: ICD-10-CM

## 2024-11-20 NOTE — TELEPHONE ENCOUNTER
Rx Refill Note  Requested Prescriptions     Pending Prescriptions Disp Refills    traMADol (ULTRAM) 50 MG tablet 30 tablet 0     Sig: Take 1 tablet by mouth Every 8 (Eight) Hours As Needed for Moderate Pain.      Last office visit with prescribing clinician: 11/7/2024   Last telemedicine visit with prescribing clinician: Visit date not found   Next office visit with prescribing clinician: 12/30/2024                         Would you like a call back once the refill request has been completed: [] Yes [] No    If the office needs to give you a call back, can they leave a voicemail: [] Yes [] No    Thalia Carreon RN  11/20/24, 08:54 CST

## 2024-11-20 NOTE — TELEPHONE ENCOUNTER
Caller: Karen Copeland    Relationship: Self    Best call back number:[phone]     Requested Prescriptions:   Requested Prescriptions     Pending Prescriptions Disp Refills    traMADol (ULTRAM) 50 MG tablet 30 tablet 0     Sig: Take 1 tablet by mouth Every 8 (Eight) Hours As Needed for Moderate Pain.        Pharmacy where request should be sent: [facility] [address] [place] [address] [phone] [facility] [phone] FX     Last office visit with prescribing clinician: 11/7/2024   Last telemedicine visit with prescribing clinician: Visit date not found   Next office visit with prescribing clinician: 12/30/2024     Additional details provided by patient:     Does the patient have less than a 3 day supply:  [] Yes  [x] No    Would you like a call back once the refill request has been completed: [x] Yes [] No      Dora Grider Rep   11/20/24 08:54 CST

## 2024-11-21 RX ORDER — TRAMADOL HYDROCHLORIDE 50 MG/1
50 TABLET ORAL EVERY 8 HOURS PRN
Qty: 30 TABLET | Refills: 0 | Status: SHIPPED | OUTPATIENT
Start: 2024-11-21

## 2024-12-10 ENCOUNTER — HOSPITAL ENCOUNTER (OUTPATIENT)
Dept: MAMMOGRAPHY | Facility: HOSPITAL | Age: 69
Discharge: HOME | End: 2024-12-10
Admitting: NURSE PRACTITIONER
Payer: MEDICARE

## 2024-12-10 PROCEDURE — 77063 BREAST TOMOSYNTHESIS BI: CPT

## 2024-12-10 PROCEDURE — 77067 SCR MAMMO BI INCL CAD: CPT

## 2024-12-10 RX ORDER — GEMFIBROZIL 600 MG/1
600 TABLET, FILM COATED ORAL
Qty: 60 TABLET | Refills: 6 | Status: SHIPPED | OUTPATIENT
Start: 2024-12-10

## 2024-12-10 NOTE — TELEPHONE ENCOUNTER
Caller: Karen Copeland    Relationship: Self    Best call back number: [phone]    Requested Prescriptions:   Requested Prescriptions     Pending Prescriptions Disp Refills    gemfibrozil (LOPID) 600 MG tablet 60 tablet 6     Sig: Take 1 tablet by mouth 2 (Two) Times a Day Before Meals.        Pharmacy where request should be sent: [address] [phone] [facility] [phone]      Last office visit with prescribing clinician: 11/7/2024   Last telemedicine visit with prescribing clinician: Visit date not found   Next office visit with prescribing clinician: 12/30/2024     Does the patient have less than a 3 day supply:  [x] Yes  [] No    Would you like a call back once the refill request has been completed: [x] Yes [] No    If the office needs to give you a call back, can they leave a voicemail: [x] Yes [] No    Dora Padron Rep   12/10/24 14:25 CST

## 2024-12-10 NOTE — TELEPHONE ENCOUNTER
Rx Refill Note  Requested Prescriptions     Pending Prescriptions Disp Refills    gemfibrozil (LOPID) 600 MG tablet 60 tablet 6     Sig: Take 1 tablet by mouth 2 (Two) Times a Day Before Meals.      Last office visit with prescribing clinician: 11/7/2024   Last telemedicine visit with prescribing clinician: Visit date not found   Next office visit with prescribing clinician: 12/30/2024                         Would you like a call back once the refill request has been completed: [] Yes [] No    If the office needs to give you a call back, can they leave a voicemail: [] Yes [] No    Thalia Carreon RN  12/10/24, 14:26 CST

## 2024-12-12 ENCOUNTER — POP HEALTH PHARMACY (OUTPATIENT)
Dept: PHARMACY | Facility: OTHER | Age: 69
End: 2024-12-12
Payer: MEDICARE

## 2024-12-13 ENCOUNTER — LAB (OUTPATIENT)
Dept: INTERNAL MEDICINE | Facility: CLINIC | Age: 69
End: 2024-12-13
Payer: MEDICARE

## 2024-12-13 DIAGNOSIS — L93.1 SUBACUTE CUTANEOUS LUPUS ERYTHEMATOSUS: Primary | ICD-10-CM

## 2024-12-17 LAB
ANA SER QL IF: POSITIVE
ANA SPECKLED TITR SER: ABNORMAL {TITER}
CENTROMERE B AB SER-ACNC: <0.2 AI (ref 0–0.9)
CHROMATIN AB SERPL-ACNC: <0.2 AI (ref 0–0.9)
DSDNA AB SER-ACNC: 3 IU/ML (ref 0–9)
ENA JO1 AB SER-ACNC: <0.2 AI (ref 0–0.9)
ENA RNP AB SER-ACNC: <0.2 AI (ref 0–0.9)
ENA SCL70 AB SER-ACNC: <0.2 AI (ref 0–0.9)
ENA SM AB SER-ACNC: <0.2 AI (ref 0–0.9)
ENA SS-A AB SER-ACNC: 0.8 AI (ref 0–0.9)
ENA SS-B AB SER-ACNC: 0.6 AI (ref 0–0.9)
LABORATORY COMMENT REPORT: ABNORMAL
Lab: ABNORMAL
Lab: ABNORMAL

## 2024-12-18 DIAGNOSIS — R07.89 CHEST WALL PAIN: ICD-10-CM

## 2024-12-18 NOTE — TELEPHONE ENCOUNTER
Caller: Min Karenvenice Lizarraga    Relationship: Self    Best call back number: [phone]    Requested Prescriptions:   Requested Prescriptions     Pending Prescriptions Disp Refills    traMADol (ULTRAM) 50 MG tablet 30 tablet 0     Sig: Take 1 tablet by mouth Every 8 (Eight) Hours As Needed for Moderate Pain.        Pharmacy where request should be sent: [facility] [address] [place] [address] [phone] [facility] [phone] FX     Last office visit with prescribing clinician: 11/7/2024   Last telemedicine visit with prescribing clinician: Visit date not found   Next office visit with prescribing clinician: 12/30/2024         Does the patient have less than a 3 day supply:  [] Yes  [x] No    Would you like a call back once the refill request has been completed: [] Yes [x] No    Dora Mercado Rep   12/18/24 09:46 CST

## 2024-12-19 RX ORDER — TRAMADOL HYDROCHLORIDE 50 MG/1
50 TABLET ORAL EVERY 8 HOURS PRN
Qty: 30 TABLET | Refills: 0 | Status: SHIPPED | OUTPATIENT
Start: 2024-12-20

## 2024-12-26 DIAGNOSIS — E78.00 HIGH CHOLESTEROL: ICD-10-CM

## 2024-12-26 RX ORDER — ATORVASTATIN CALCIUM 40 MG/1
40 TABLET, FILM COATED ORAL DAILY
Qty: 90 TABLET | Refills: 0 | Status: SHIPPED | OUTPATIENT
Start: 2024-12-26

## 2024-12-30 ENCOUNTER — OFFICE VISIT (OUTPATIENT)
Dept: INTERNAL MEDICINE | Facility: CLINIC | Age: 69
End: 2024-12-30
Payer: MEDICARE

## 2024-12-30 VITALS
BODY MASS INDEX: 21.53 KG/M2 | SYSTOLIC BLOOD PRESSURE: 134 MMHG | DIASTOLIC BLOOD PRESSURE: 83 MMHG | WEIGHT: 117 LBS | RESPIRATION RATE: 18 BRPM | HEART RATE: 71 BPM | HEIGHT: 62 IN | OXYGEN SATURATION: 98 % | TEMPERATURE: 98.2 F

## 2024-12-30 DIAGNOSIS — M25.552 CHRONIC LEFT HIP PAIN: ICD-10-CM

## 2024-12-30 DIAGNOSIS — G89.29 CHRONIC LEFT HIP PAIN: ICD-10-CM

## 2024-12-30 DIAGNOSIS — L93.1 SUBACUTE CUTANEOUS LUPUS ERYTHEMATOSUS: Primary | ICD-10-CM

## 2024-12-30 DIAGNOSIS — Z79.899 LONG-TERM USE OF HIGH-RISK MEDICATION: ICD-10-CM

## 2024-12-30 PROCEDURE — 1125F AMNT PAIN NOTED PAIN PRSNT: CPT | Performed by: NURSE PRACTITIONER

## 2024-12-30 PROCEDURE — 1159F MED LIST DOCD IN RCRD: CPT | Performed by: NURSE PRACTITIONER

## 2024-12-30 PROCEDURE — 1160F RVW MEDS BY RX/DR IN RCRD: CPT | Performed by: NURSE PRACTITIONER

## 2024-12-30 PROCEDURE — 99213 OFFICE O/P EST LOW 20 MIN: CPT | Performed by: NURSE PRACTITIONER

## 2024-12-30 RX ORDER — FLUOCINONIDE 0.5 MG/G
1 OINTMENT TOPICAL 2 TIMES DAILY
COMMUNITY

## 2024-12-30 NOTE — PROGRESS NOTES
Subjective     Chief Complaint   Patient presents with    Heartburn    Hyperlipidemia       History of Present Illness  The patient is a 69-year-old female who presents for evaluation of cutaneous lupus.    She was diagnosed with cutaneous lupus by Dr. Espinal, a dermatologist, following a positive RIVAS test in February. The condition manifests as intermittent flare-ups behind her ears and on her back side, occurring approximately once every other month. She reports that the lesions are not pruritic or inflamed. A biopsy was performed on 12/12/2024, and she has been using lidocaine 0.05 ointment as needed, but not daily. She also mentions an unresolved issue with a suture from the biopsy procedure. Despite a family history of lupus, a rheumatologist, Dr. Ralf Sloan, has ruled out systemic lupus in her case.    FAMILY HISTORY  Her brother had systemic lupus and passed away due to a staph infection. Her sister also had lupus.    MEDICATIONS  Current: Tramadol, lidocaine 0.05% ointment        Otherwise complete ROS reviewed and negative except as mentioned in the HPI.    Past Medical History:   Past Medical History:   Diagnosis Date    Anxiety     Depression     Hyperlipidemia     Liver enzyme elevation     Sleep apnea      Past Surgical History:History reviewed. No pertinent surgical history.  Social History:  reports that she has been smoking cigarettes. She started smoking about 21 years ago. She has a 5.2 pack-year smoking history. She has been exposed to tobacco smoke. She has never used smokeless tobacco. She reports current alcohol use of about 10.0 standard drinks of alcohol per week. She reports current drug use. Drug: Marijuana.    Family History: family history includes ADD / ADHD in her son; Arthritis in her mother and sister; Breast cancer in her sister; Cancer in her mother and sister; Hyperlipidemia in her mother; Lupus in her brother, maternal aunt, and sister.       Allergies:  Allergies    Allergen Reactions    Amoxicillin Diarrhea, Nausea Only and Other (See Comments)     Stomach cramping and fatigue    Peanut-Containing Drug Products Shortness Of Breath    Diclofenac Sodium Unknown - High Severity     Flushing and soa     Medications:  Prior to Admission medications    Medication Sig Start Date End Date Taking? Authorizing Provider   aspirin 81 MG tablet Take 1 tablet by mouth Daily.    ProviderMoshe MD   atorvastatin (LIPITOR) 40 MG tablet Take 1 tablet by mouth once daily 12/26/24   Afua Dey APRN   coenzyme Q10 50 MG capsule capsule Take 1 capsule by mouth Daily.    Provider, MD Moshe   dicyclomine (BENTYL) 10 MG capsule Take 1 capsule by mouth 4 (Four) Times a Day Before Meals & at Bedtime. 4/26/24   Afua Dey APRN   famotidine (PEPCID) 40 MG tablet Take 1 tablet by mouth Daily. 4/26/24   Afua Dey APRN   fluticasone (FLONASE) 50 MCG/ACT nasal spray Administer 2 sprays into the nostril(s) as directed by provider Daily. 10/11/24   Suly Ingram APRN   gemfibrozil (LOPID) 600 MG tablet Take 1 tablet by mouth 2 (Two) Times a Day Before Meals. 12/10/24   Afua Dey APRN   meclizine (ANTIVERT) 12.5 MG tablet Take 1 tablet by mouth 3 (Three) Times a Day As Needed for Dizziness. 10/11/24   Suly Ingram APRN   methylPREDNISolone (MEDROL) 4 MG dose pack Take as directed on package instructions. 10/11/24   Suly Ingram APRN   mupirocin (BACTROBAN) 2 % ointment Apply 1 Application topically to the appropriate area as directed 2 (Two) Times a Day. 9/23/24   Afua Dey APRN   pantoprazole (PROTONIX) 40 MG EC tablet Take 1 tablet by mouth Daily. 9/23/24   Afua Dey APRN   traMADol (ULTRAM) 50 MG tablet Take 1 tablet by mouth Every 8 (Eight) Hours As Needed for Moderate Pain. 12/20/24   Afua Dey APRN       Objective     Vital Signs: /83   Pulse 71   Temp 98.2 °F (36.8  "°C)   Resp 18   Ht 157.5 cm (62\")   Wt 53.1 kg (117 lb)   SpO2 98%   BMI 21.40 kg/m²     Physical Exam    Physical Exam  Vitals reviewed.   Constitutional:       Appearance: Normal appearance. She is well-developed.   HENT:      Head: Normocephalic and atraumatic.   Eyes:      Pupils: Pupils are equal, round, and reactive to light.   Neck:      Vascular: No JVD.   Cardiovascular:      Rate and Rhythm: Normal rate and regular rhythm.   Pulmonary:      Effort: Pulmonary effort is normal.      Breath sounds: Normal breath sounds.   Abdominal:      General: Bowel sounds are normal.      Palpations: Abdomen is soft.   Musculoskeletal:         General: No deformity.      Cervical back: Normal range of motion and neck supple.   Lymphadenopathy:      Cervical: No cervical adenopathy.   Skin:     General: Skin is warm and dry.      Findings: Rash present.   Neurological:      Mental Status: She is alert and oriented to person, place, and time.   Psychiatric:         Behavior: Behavior normal.         Thought Content: Thought content normal.         Judgment: Judgment normal.       BMI is within normal parameters. No other follow-up for BMI required.    Results Reviewed:  Glucose   Date Value Ref Range Status   09/23/2024 96 70 - 99 mg/dL Final   10/21/2019 106 74 - 109 mg/dL Final     BUN   Date Value Ref Range Status   09/23/2024 13 8 - 27 mg/dL Final   10/21/2019 11 8 - 23 mg/dL Final     Creatinine   Date Value Ref Range Status   09/23/2024 0.55 (L) 0.57 - 1.00 mg/dL Final   10/21/2019 0.5 0.5 - 0.9 mg/dL Final     Sodium   Date Value Ref Range Status   09/23/2024 143 134 - 144 mmol/L Final   10/21/2019 141 136 - 145 mmol/L Final     Potassium   Date Value Ref Range Status   09/23/2024 4.3 3.5 - 5.2 mmol/L Final   10/21/2019 3.7 3.5 - 5.0 mmol/L Final     Chloride   Date Value Ref Range Status   09/23/2024 104 96 - 106 mmol/L Final   10/21/2019 102 98 - 111 mmol/L Final     CO2   Date Value Ref Range Status "   10/21/2019 20 (L) 22 - 29 mmol/L Final     Total CO2   Date Value Ref Range Status   09/23/2024 22 20 - 29 mmol/L Final     Calcium   Date Value Ref Range Status   09/23/2024 9.8 8.7 - 10.3 mg/dL Final   10/21/2019 9.5 8.8 - 10.2 mg/dL Final     ALT (SGPT)   Date Value Ref Range Status   09/23/2024 14 0 - 32 IU/L Final   11/19/2019 35 (H) 5 - 33 U/L Final     AST (SGOT)   Date Value Ref Range Status   09/23/2024 15 0 - 40 IU/L Final   11/19/2019 28 5 - 32 U/L Final     WBC   Date Value Ref Range Status   09/23/2024 5.0 3.4 - 10.8 x10E3/uL Final   10/21/2019 6.2 4.8 - 10.8 K/uL Final     Hematocrit   Date Value Ref Range Status   09/23/2024 38.4 34.0 - 46.6 % Final   10/21/2019 45.7 37.0 - 47.0 % Final     Platelets   Date Value Ref Range Status   09/23/2024 284 150 - 450 x10E3/uL Final   10/21/2019 250 130 - 400 K/uL Final     Triglycerides   Date Value Ref Range Status   09/23/2024 173 (H) 0 - 149 mg/dL Final   10/21/2019 524 (H) 0 - 149 mg/dL Final     HDL Cholesterol   Date Value Ref Range Status   09/23/2024 72 >39 mg/dL Final   10/21/2019 75 65 - 121 mg/dL Final     Comment:     VALUES>60 MG/DL ARE ASSOCIATED WITH A DECREASED RISK OF  ATHEROSCLEROTIC CARDIOVASCULAR DISEASE     LDL Cholesterol    Date Value Ref Range Status   10/21/2019 see below <100 mg/dL Final     Comment:     When the triglyceride is >400 mg/dL the calculated LDL and  VLDL are not valid.  <100 MG/DL=OPITIMAL    100-129 MG/DL=DESIRABLE    130-159 MG/DL BORDERLINE=INCREASED RISK OF ATHEROSCLEROTIC  CARDIOVASCULAR DISEASE    > OR = 160 MG/DL=ASSOCIATED WITH AN INCREASE RISK OF  ATHEROSCLEROTIC CARDIOVASCULAR DISEASE   10/21/2019 100 <100 mg/dL Final     Comment:     <100 MG/DL=OPITIMAL    100-129 MG/DL=DESIRABLE    130-159 MG/DL BORDERLINE=INCREASED RISK OF ATHEROSCLEROTIC  CARDIOVASCULAR DISEASE    > OR = 160 MG/DL=ASSOCIATED WITH AN INCREASE RISK OF  ATHEROSCLEROTIC CARDIOVASCULAR DISEASE     LDL Chol Calc ([facility])   Date Value Ref Range  Status   09/23/2024 92 0 - 99 mg/dL Final     Hemoglobin A1C   Date Value Ref Range Status   12/03/2021 5.1 % Final       Results  Laboratory Studies  RIVAS test was positive.      Assessment / Plan     Assessment/Plan:  Diagnoses and all orders for this visit:    1. Subacute cutaneous lupus erythematosus (Primary)    2. Long-term use of high-risk medication    3. Chronic left hip pain      Assessment & Plan  1. Cutaneous lupus.  The condition is currently limited to the skin and does not involve systemic manifestations. She has been using lidocaine 0.05% ointment as needed for flare-ups. The patient was informed that cutaneous lupus can be managed topically without the need for systemic immunosuppressive therapy. She will continue to follow up with her dermatologist, who will monitor the condition closely. If there are any changes or concerns, she may be referred back to Dr. Ralf Sloan or another specialist.    2. Medication management.  A prescription for tramadol has been issued previously. She is advised to take a dose of tramadol prior to her next appointment to ensure it appears in her drug screen. She will also  her statin medication for cholesterol management.    PROCEDURE  Biopsy was performed on 12/12/2024.     Return in about 3 months (around 3/30/2025). unless patient needs to be seen sooner or acute issues arise.    Code Status: Full.   Patient or patient representative verbalized consent for the use of Ambient Listening during the visit with  COLLEEN Patton for chart documentation. 12/30/2024  10:19 CST  I have discussed the patient results/orders and and plan/recommendation with them at today's visit.      Signed by:    COLLEEN Patton Date: 12/30/24

## 2025-01-07 NOTE — TELEPHONE ENCOUNTER
HUB OK TO RELAY AND R/S:     CALLED PATIENT TO R/S APPT ON 01/06. NA, LVM. HUB OK TO R/S NEXT AVAILABLE.   Rx Refill Note  Requested Prescriptions     Pending Prescriptions Disp Refills    gemfibrozil (LOPID) 600 MG tablet [Pharmacy Med Name: Gemfibrozil 600 MG Oral Tablet] 60 tablet 0     Sig: TAKE 1 TABLET BY MOUTH TWICE DAILY BEFORE  MEALS      Last office visit with prescribing clinician: 10/26/2023   Last telemedicine visit with prescribing clinician: Visit date not found   Next office visit with prescribing clinician: 12/26/2023                         Would you like a call back once the refill request has been completed: [] Yes [] No    If the office needs to give you a call back, can they leave a voicemail: [] Yes [] No    Thalia Carreon RN  12/04/23, 07:16 CST

## 2025-01-15 DIAGNOSIS — R07.89 CHEST WALL PAIN: ICD-10-CM

## 2025-01-15 NOTE — TELEPHONE ENCOUNTER
Caller: Karen Copeland    Relationship: Self    Best call back number:  [phone]     Requested Prescriptions:   Requested Prescriptions     Pending Prescriptions Disp Refills    traMADol (ULTRAM) 50 MG tablet 30 tablet 0     Sig: Take 1 tablet by mouth Every 8 (Eight) Hours As Needed for Moderate Pain.        Pharmacy where request should be sent:  [facility]    Last office visit with prescribing clinician: 12/30/2024   Last telemedicine visit with prescribing clinician: Visit date not found   Next office visit with prescribing clinician: 3/28/2025     Additional details provided by patient:      Does the patient have less than a 3 day supply:  [x] Yes  [] No    Would you like a call back once the refill request has been completed: [] Yes [x] No    If the office needs to give you a call back, can they leave a voicemail: [] Yes [x] No    Dora Gregg Rep   01/15/25 09:55 CST

## 2025-01-16 RX ORDER — TRAMADOL HYDROCHLORIDE 50 MG/1
50 TABLET ORAL EVERY 8 HOURS PRN
Qty: 30 TABLET | Refills: 0 | Status: SHIPPED | OUTPATIENT
Start: 2025-01-17

## 2025-02-12 DIAGNOSIS — R07.89 CHEST WALL PAIN: ICD-10-CM

## 2025-02-12 NOTE — TELEPHONE ENCOUNTER
Rx Refill Note  Requested Prescriptions     Pending Prescriptions Disp Refills    traMADol (ULTRAM) 50 MG tablet 30 tablet 0     Sig: Take 1 tablet by mouth Every 8 (Eight) Hours As Needed for Moderate Pain.      Last office visit with prescribing clinician: 12/30/2024   Last telemedicine visit with prescribing clinician: Visit date not found   Next office visit with prescribing clinician: 3/28/2025                         Would you like a call back once the refill request has been completed: [] Yes [] No    If the office needs to give you a call back, can they leave a voicemail: [] Yes [] No    Thalia Carreon RN  02/12/25, 09:09 CST

## 2025-02-12 NOTE — TELEPHONE ENCOUNTER
Caller: Min Karenvenice Lizarraga    Relationship: Self    Best call back number: [phone]     Requested Prescriptions:   Requested Prescriptions     Pending Prescriptions Disp Refills    traMADol (ULTRAM) 50 MG tablet 30 tablet 0     Sig: Take 1 tablet by mouth Every 8 (Eight) Hours As Needed for Moderate Pain.        Pharmacy where request should be sent: [facility] [address] [place] [address] [phone] [facility] [phone]     Last office visit with prescribing clinician: 12/30/2024   Last telemedicine visit with prescribing clinician: Visit date not found   Next office visit with prescribing clinician: 3/28/2025     Additional details provided by patient: 4 LEFT    Does the patient have less than a 3 day supply:  [] Yes  [x] No    Would you like a call back once the refill request has been completed: [] Yes [x] No    If the office needs to give you a call back, can they leave a voicemail: [] Yes [x] No    Dora Brush Rep   02/12/25 09:07 CST

## 2025-02-13 RX ORDER — TRAMADOL HYDROCHLORIDE 50 MG/1
50 TABLET ORAL EVERY 8 HOURS PRN
Qty: 30 TABLET | Refills: 0 | Status: SHIPPED | OUTPATIENT
Start: 2025-02-13

## 2025-02-24 ENCOUNTER — CLINICAL SUPPORT (OUTPATIENT)
Dept: INTERNAL MEDICINE | Facility: CLINIC | Age: 70
End: 2025-02-24
Payer: MEDICARE

## 2025-02-24 ENCOUNTER — LAB (OUTPATIENT)
Dept: INTERNAL MEDICINE | Facility: CLINIC | Age: 70
End: 2025-02-24
Payer: MEDICARE

## 2025-02-24 DIAGNOSIS — Z23 ENCOUNTER FOR IMMUNIZATION: Primary | ICD-10-CM

## 2025-02-24 DIAGNOSIS — L93.1 SUBACUTE CUTANEOUS LUPUS ERYTHEMATOSUS: Primary | ICD-10-CM

## 2025-02-24 PROCEDURE — 90662 IIV NO PRSV INCREASED AG IM: CPT | Performed by: NURSE PRACTITIONER

## 2025-02-24 PROCEDURE — G0008 ADMIN INFLUENZA VIRUS VAC: HCPCS | Performed by: NURSE PRACTITIONER

## 2025-02-24 NOTE — LETTER
[facility]  Vaccine Consent Form    Patient Name:  Karen Copeland  Patient :  1955     Vaccine(s) Ordered    Fluzone High-Dose 65+yrs ([id number])        Screening Checklist  The following questions should be completed prior to vaccination. If you answer “yes” to any question, it does not necessarily mean you should not be vaccinated. It just means we may need to clarify or ask more questions. If a question is unclear, please ask your healthcare provider to explain it.    Yes No   Any fever or moderate to severe illness today (mild illness and/or antibiotic treatment are not contraindications)?     Do you have a history of a serious reaction to any previous vaccinations, such as anaphylaxis, encephalopathy within 7 days, Guillain-[place] syndrome within 6 weeks, seizure?     Have you received any live vaccine(s) (e.g MMR, MALLY) or any other vaccines in the last month (to ensure duplicate doses aren't given)?     Do you have an anaphylactic allergy to latex (DTaP, DTaP-IPV, Hep A, Hep B, MenB, RV, Td, Tdap), baker’s yeast (Hep B, HPV), polysorbates (RSV, nirsevimab, PCV 20, Rotavirrus, Tdap, Shingrix), or gelatin (MALLY, MMR)?     Do you have an anaphylactic allergy to neomycin (Rabies, MALLY, MMR, IPV, Hep A), polymyxin B (IPV), or streptomycin (IPV)?      Any cancer, leukemia, AIDS, or other immune system disorder? (MALLY, MMR, RV)     Do you have a parent, brother, or sister with an immune system problem (if immune competence of vaccine recipient clinically verified, can proceed)? (MMR, MALLY)     Any recent steroid treatments for >2 weeks, chemotherapy, or radiation treatment? (MALLY, MMR)     Have you received antibody-containing blood transfusions or IVIG in the past 11 months (recommended interval is dependent on product)? (MMR, MALLY)     Have you taken antiviral drugs (acyclovir, famciclovir, valacyclovir for MALLY) in the last 24 or 48 hours, respectively?      Are you pregnant or planning to become  "pregnant within 1 month? (MALLY, MMR, HPV, IPV, MenB, Abrexvy; For Hep B- refer to Engerix-B; For RSV - Abrysvo is indicated for 32-36 weeks of pregnancy from September to January)     For infants, have you ever been told your child has had intussusception or a medical emergency involving obstruction of the intestine (Rotavirus)? If not for an infant, can skip this question.         *Ordering Physicians/APC should be consulted if \"yes\" is checked by the patient or guardian above.  I have received, read, and understand the Vaccine Information Statement (VIS) for each vaccine ordered.  I have considered my or my child's health status as well as the health status of my close contacts.  I have taken the opportunity to discuss my vaccine questions with my or my child's health care provider.   I have requested that the ordered vaccine(s) be given to me or my child.  I understand the benefits and risks of the vaccines.  I understand that I should remain in the clinic for 15 minutes after receiving the vaccine(s).  _________________________________________________________  Signature of Patient or Parent/Legal Guardian ____________________  Date     "

## 2025-02-24 NOTE — PROGRESS NOTES
After obtaining consent, and per orders of Afua MACIAS, injection of Flu given by Annetta Gomez CMA. Patient instructed to remain in clinic for 20 minutes afterwards, and to report any adverse reaction to me immediately.

## 2025-03-12 DIAGNOSIS — R07.89 CHEST WALL PAIN: ICD-10-CM

## 2025-03-12 RX ORDER — TRAMADOL HYDROCHLORIDE 50 MG/1
50 TABLET ORAL EVERY 8 HOURS PRN
Qty: 30 TABLET | Refills: 0 | Status: SHIPPED | OUTPATIENT
Start: 2025-03-12

## 2025-03-12 NOTE — TELEPHONE ENCOUNTER
Caller: Karen Copeland    Relationship: Self    Best call back number: [phone]     Requested Prescriptions:   Requested Prescriptions     Pending Prescriptions Disp Refills    traMADol (ULTRAM) 50 MG tablet 30 tablet 0     Sig: Take 1 tablet by mouth Every 8 (Eight) Hours As Needed for Moderate Pain.        Pharmacy where request should be sent: [facility] [address] [place] [address] [phone] [facility] [phone] FX     Last office visit with prescribing clinician: 12/30/2024   Last telemedicine visit with prescribing clinician: Visit date not found   Next office visit with prescribing clinician: 3/25/2025     Additional details provided by patient:     Does the patient have less than a 3 day supply:  [] Yes  [x] No    Would you like a call back once the refill request has been completed: [x] Yes [] No    If the office needs to give you a call back, can they leave a voicemail: [x] Yes [] No    Dora Langston Rep   03/12/25 09:35 CDT

## 2025-03-25 ENCOUNTER — OFFICE VISIT (OUTPATIENT)
Dept: INTERNAL MEDICINE | Facility: CLINIC | Age: 70
End: 2025-03-25
Payer: MEDICARE

## 2025-03-25 VITALS
TEMPERATURE: 97.7 F | SYSTOLIC BLOOD PRESSURE: 147 MMHG | BODY MASS INDEX: 21.71 KG/M2 | DIASTOLIC BLOOD PRESSURE: 79 MMHG | HEART RATE: 76 BPM | OXYGEN SATURATION: 98 % | WEIGHT: 118 LBS | HEIGHT: 62 IN

## 2025-03-25 DIAGNOSIS — R42 VERTIGO: ICD-10-CM

## 2025-03-25 DIAGNOSIS — Z78.0 POST-MENOPAUSE: ICD-10-CM

## 2025-03-25 DIAGNOSIS — G89.29 CHRONIC LEFT HIP PAIN: ICD-10-CM

## 2025-03-25 DIAGNOSIS — Z79.899 LONG-TERM USE OF HIGH-RISK MEDICATION: Primary | ICD-10-CM

## 2025-03-25 DIAGNOSIS — M25.552 CHRONIC LEFT HIP PAIN: ICD-10-CM

## 2025-03-25 RX ORDER — MECLIZINE HYDROCHLORIDE 25 MG/1
25 TABLET ORAL 3 TIMES DAILY PRN
Qty: 45 TABLET | Refills: 1 | Status: SHIPPED | OUTPATIENT
Start: 2025-03-25

## 2025-03-25 NOTE — PROGRESS NOTES
Subjective     Chief Complaint   Patient presents with    Subacute cutaneous lupus erythematosus       History of Present Illness  The patient is a 69-year-old female who presents for evaluation of hip pain and dizziness.    She experiences pain predominantly in her left hip, although recently, she has reported discomfort in her right hip as well. Her occupation as a  requires her to be on her feet for two days a week. She reports that her hip is not causing her significant discomfort today, and she plans to engage in yard work. She takes tramadol 50 mg once daily, occasionally increasing the dosage to three times a day as needed. She also supplements her pain management with Tylenol, taking two doses in the afternoon and one before bedtime. Despite her alcohol consumption, she reports no liver-related concerns.    She reports experiencing dizziness, particularly when changing positions such as rolling over in bed or standing up. These episodes occur almost daily, lasting approximately 5 to 6 seconds, and have been increasing in frequency over the past month and a half. She also reports hearing loss in her left ear, which she attributes to eardrum damage sustained during a fever in her youth. She has been self-medicating with meclizine, which she reports as being effective. She had an ear infection a few months ago and was treated with a steroid pack.    Supplemental Information  She is currently on medication for cholesterol management.    SOCIAL HISTORY  The patient works as a . She drinks about 2 to 3 glasses of wine in the afternoons and evenings.    MEDICATIONS  tramadol, Tylenol      Otherwise complete ROS reviewed and negative except as mentioned in the HPI.    Past Medical History:   Past Medical History:   Diagnosis Date    Anxiety     Depression     Hyperlipidemia     Liver enzyme elevation     Sleep apnea      Past Surgical History:No past surgical history on file.  Social  History:  reports that she has been smoking cigarettes. She started smoking about 21 years ago. She has a 5.3 pack-year smoking history. She has been exposed to tobacco smoke. She has never used smokeless tobacco. She reports current alcohol use of about 10.0 standard drinks of alcohol per week. She reports current drug use. Drug: Marijuana.    Family History: family history includes ADD / ADHD in her son; Arthritis in her mother and sister; Breast cancer in her sister; Cancer in her mother and sister; Hyperlipidemia in her mother; Lupus in her brother, maternal aunt, and sister.       Allergies:  Allergies   Allergen Reactions    Amoxicillin Diarrhea, Nausea Only and Other (See Comments)     Stomach cramping and fatigue    Peanut-Containing Drug Products Shortness Of Breath    Diclofenac Sodium Unknown - High Severity     Flushing and soa     Medications:  Prior to Admission medications    Medication Sig Start Date End Date Taking? Authorizing Provider   aspirin 81 MG tablet Take 1 tablet by mouth Daily.   Yes ProviderMoshe MD   atorvastatin (LIPITOR) 40 MG tablet Take 1 tablet by mouth once daily 12/26/24  Yes Afua Dey APRN   coenzyme Q10 50 MG capsule capsule Take 1 capsule by mouth Daily.   Yes ProviderMoshe MD   dicyclomine (BENTYL) 10 MG capsule Take 1 capsule by mouth 4 (Four) Times a Day Before Meals & at Bedtime. 4/26/24  Yes Afua Dey APRN   famotidine (PEPCID) 40 MG tablet Take 1 tablet by mouth Daily. 4/26/24  Yes Afua Dey APRN   fluocinonide (LIDEX) 0.05 % ointment Apply 1 Application topically to the appropriate area as directed 2 (Two) Times a Day.   Yes ProviderMoshe MD   fluticasone (FLONASE) 50 MCG/ACT nasal spray Administer 2 sprays into the nostril(s) as directed by provider Daily. 10/11/24  Yes Suly Ingram APRN   gemfibrozil (LOPID) 600 MG tablet Take 1 tablet by mouth 2 (Two) Times a Day Before Meals. 12/10/24  Yes  "Afua Dey APRN   meclizine (ANTIVERT) 12.5 MG tablet Take 1 tablet by mouth 3 (Three) Times a Day As Needed for Dizziness. 10/11/24  Yes Suly Ingram APRN   mupirocin (BACTROBAN) 2 % ointment Apply 1 Application topically to the appropriate area as directed 2 (Two) Times a Day. 9/23/24  Yes Afua Dey APRN   pantoprazole (PROTONIX) 40 MG EC tablet Take 1 tablet by mouth Daily. 9/23/24  Yes Afua Dey APRN   traMADol (ULTRAM) 50 MG tablet Take 1 tablet by mouth Every 8 (Eight) Hours As Needed for Moderate Pain. 3/12/25  Yes Afua Dey APRN   methylPREDNISolone (MEDROL) 4 MG dose pack Take as directed on package instructions.  Patient not taking: Reported on 3/25/2025 10/11/24 3/25/25  Suly Ingram APRN       Objective     Vital Signs: /79   Pulse 76   Temp 97.7 °F (36.5 °C)   Ht 157.5 cm (62\")   Wt 53.5 kg (118 lb)   SpO2 98%   Breastfeeding No   BMI 21.58 kg/m²     Physical Exam  There is white scarring and brown discoloration in the left ear.  Lungs were auscultated.  Heart was examined.  Left lateral gaze nystagmus noted.  Physical Exam  Vitals reviewed.   Constitutional:       Appearance: Normal appearance. She is well-developed.   HENT:      Head: Normocephalic and atraumatic.      Right Ear: Tympanic membrane normal.      Left Ear: Tympanic membrane normal. Decreased hearing noted.   Eyes:      Pupils: Pupils are equal, round, and reactive to light.   Neck:      Vascular: No JVD.   Cardiovascular:      Rate and Rhythm: Normal rate and regular rhythm.      Heart sounds: Normal heart sounds.   Pulmonary:      Effort: Pulmonary effort is normal.      Breath sounds: Normal breath sounds.   Abdominal:      General: Bowel sounds are normal.      Palpations: Abdomen is soft.   Musculoskeletal:         General: No deformity.      Cervical back: Normal range of motion and neck supple.      Comments: L hip tenderness   Lymphadenopathy:      " Cervical: No cervical adenopathy.   Skin:     General: Skin is warm and dry.   Neurological:      Mental Status: She is alert and oriented to person, place, and time.      Comments: L lateral gaze nystagmus   Psychiatric:         Behavior: Behavior normal.         Thought Content: Thought content normal.         Judgment: Judgment normal.       BMI is within normal parameters. No other follow-up for BMI required.    Results Reviewed:  Glucose   Date Value Ref Range Status   09/23/2024 96 70 - 99 mg/dL Final   10/21/2019 106 74 - 109 mg/dL Final     BUN   Date Value Ref Range Status   09/23/2024 13 8 - 27 mg/dL Final   10/21/2019 11 8 - 23 mg/dL Final     Creatinine   Date Value Ref Range Status   09/23/2024 0.55 (L) 0.57 - 1.00 mg/dL Final   10/21/2019 0.5 0.5 - 0.9 mg/dL Final     Sodium   Date Value Ref Range Status   09/23/2024 143 134 - 144 mmol/L Final   10/21/2019 141 136 - 145 mmol/L Final     Potassium   Date Value Ref Range Status   09/23/2024 4.3 3.5 - 5.2 mmol/L Final   10/21/2019 3.7 3.5 - 5.0 mmol/L Final     Chloride   Date Value Ref Range Status   09/23/2024 104 96 - 106 mmol/L Final   10/21/2019 102 98 - 111 mmol/L Final     CO2   Date Value Ref Range Status   10/21/2019 20 (L) 22 - 29 mmol/L Final     Total CO2   Date Value Ref Range Status   09/23/2024 22 20 - 29 mmol/L Final     Calcium   Date Value Ref Range Status   09/23/2024 9.8 8.7 - 10.3 mg/dL Final   10/21/2019 9.5 8.8 - 10.2 mg/dL Final     ALT (SGPT)   Date Value Ref Range Status   09/23/2024 14 0 - 32 IU/L Final   11/19/2019 35 (H) 5 - 33 U/L Final     AST (SGOT)   Date Value Ref Range Status   09/23/2024 15 0 - 40 IU/L Final   11/19/2019 28 5 - 32 U/L Final     WBC   Date Value Ref Range Status   09/23/2024 5.0 3.4 - 10.8 x10E3/uL Final   10/21/2019 6.2 4.8 - 10.8 K/uL Final     Hematocrit   Date Value Ref Range Status   09/23/2024 38.4 34.0 - 46.6 % Final   10/21/2019 45.7 37.0 - 47.0 % Final     Platelets   Date Value Ref Range Status    09/23/2024 284 150 - 450 x10E3/uL Final   10/21/2019 250 130 - 400 K/uL Final     Triglycerides   Date Value Ref Range Status   09/23/2024 173 (H) 0 - 149 mg/dL Final   10/21/2019 524 (H) 0 - 149 mg/dL Final     HDL Cholesterol   Date Value Ref Range Status   09/23/2024 72 >39 mg/dL Final   10/21/2019 75 65 - 121 mg/dL Final     Comment:     VALUES>60 MG/DL ARE ASSOCIATED WITH A DECREASED RISK OF  ATHEROSCLEROTIC CARDIOVASCULAR DISEASE     LDL Cholesterol    Date Value Ref Range Status   10/21/2019 see below <100 mg/dL Final     Comment:     When the triglyceride is >400 mg/dL the calculated LDL and  VLDL are not valid.  <100 MG/DL=OPITIMAL    100-129 MG/DL=DESIRABLE    130-159 MG/DL BORDERLINE=INCREASED RISK OF ATHEROSCLEROTIC  CARDIOVASCULAR DISEASE    > OR = 160 MG/DL=ASSOCIATED WITH AN INCREASE RISK OF  ATHEROSCLEROTIC CARDIOVASCULAR DISEASE   10/21/2019 100 <100 mg/dL Final     Comment:     <100 MG/DL=OPITIMAL    100-129 MG/DL=DESIRABLE    130-159 MG/DL BORDERLINE=INCREASED RISK OF ATHEROSCLEROTIC  CARDIOVASCULAR DISEASE    > OR = 160 MG/DL=ASSOCIATED WITH AN INCREASE RISK OF  ATHEROSCLEROTIC CARDIOVASCULAR DISEASE     LDL Chol Calc (NIH)   Date Value Ref Range Status   09/23/2024 92 0 - 99 mg/dL Final     Hemoglobin A1C   Date Value Ref Range Status   12/03/2021 5.1 % Final       Results        Assessment / Plan     Assessment/Plan:  Diagnoses and all orders for this visit:    1. Long-term use of high-risk medication (Primary)  -     Compliance Drug Analysis, Ur - Urine, Clean Catch    2. Chronic left hip pain    3. Vertigo  -     meclizine (ANTIVERT) 25 MG tablet; Take 1 tablet by mouth 3 (Three) Times a Day As Needed for Dizziness.  Dispense: 45 tablet; Refill: 1    4. Post-menopause  -     DEXA Bone Density Axial    Denies need for refills at this time. Orthostatics performed in office. Encouraged pt to use support stockings and standing up slowly to help prevent dizzy spells. Instructed pt to notify  provider if symptoms worsen.   Assessment & Plan  1. Hip pain.  She is currently managing her hip pain with tramadol 50 mg, taken once daily, and supplements this with Tylenol. A drug screen will be conducted today. She does not require a refill of her tramadol prescription at this time.    2. Dizziness.  Her dizziness has been escalating, occurring during positional changes such as turning over in bed or standing up. The dizziness lasts for about 5-6 seconds. Examination revealed left lateral gaze nystagmus. The ear appears normal, and it is unlikely that the dizziness is ear-related. A prescription for meclizine 25 mg will be sent to [facility]. She is advised to maintain adequate hydration by consuming plenty of water.    3. Health Maintenance.  A bone density test will be scheduled at [facility] to assess her risk of fractures.      Return in about 3 months (around 6/25/2025). unless patient needs to be seen sooner or acute issues arise.    Code Status: Full    Patient or patient representative verbalized consent for the use of Ambient Listening during the visit with  COLLEEN Patton for chart documentation. 3/25/2025  09:56 CDT    I have discussed the patient results/orders and and plan/recommendation with them at today's visit.      Signed by:    COLLEEN Patton Date: 03/25/25

## 2025-03-27 DIAGNOSIS — E78.00 HIGH CHOLESTEROL: ICD-10-CM

## 2025-03-27 RX ORDER — ATORVASTATIN CALCIUM 40 MG/1
40 TABLET, FILM COATED ORAL DAILY
Qty: 90 TABLET | Refills: 2 | Status: SHIPPED | OUTPATIENT
Start: 2025-03-27

## 2025-03-27 NOTE — TELEPHONE ENCOUNTER
Rx Refill Note  Requested Prescriptions     Pending Prescriptions Disp Refills    atorvastatin (LIPITOR) 40 MG tablet [Pharmacy Med Name: Atorvastatin Calcium 40 MG Oral Tablet] 90 tablet 0     Sig: Take 1 tablet by mouth once daily      Last office visit with prescribing clinician: 3/25/2025   Last telemedicine visit with prescribing clinician: Visit date not found   Next office visit with prescribing clinician: 6/27/2025                         Would you like a call back once the refill request has been completed: [] Yes [] No    If the office needs to give you a call back, can they leave a voicemail: [] Yes [] No    Thalia Carreon RN  03/27/25, 07:23 CDT

## 2025-03-29 LAB — DRUGS UR: NORMAL

## 2025-04-11 DIAGNOSIS — R42 VERTIGO: Primary | ICD-10-CM

## 2025-04-11 DIAGNOSIS — R07.89 CHEST WALL PAIN: ICD-10-CM

## 2025-04-11 RX ORDER — AZITHROMYCIN 250 MG/1
TABLET, FILM COATED ORAL
Qty: 6 TABLET | Refills: 0 | Status: SHIPPED | OUTPATIENT
Start: 2025-04-11

## 2025-04-11 RX ORDER — METHYLPREDNISOLONE 4 MG/1
TABLET ORAL
Qty: 21 TABLET | Refills: 0 | Status: SHIPPED | OUTPATIENT
Start: 2025-04-11

## 2025-04-11 RX ORDER — TRAMADOL HYDROCHLORIDE 50 MG/1
50 TABLET ORAL EVERY 8 HOURS PRN
Qty: 30 TABLET | Refills: 0 | Status: SHIPPED | OUTPATIENT
Start: 2025-04-11

## 2025-04-11 NOTE — TELEPHONE ENCOUNTER
Caller: Karen Copeland    Relationship: Self    Best call back number: [phone]     Requested Prescriptions:   Requested Prescriptions     Pending Prescriptions Disp Refills    traMADol (ULTRAM) 50 MG tablet 30 tablet 0     Sig: Take 1 tablet by mouth Every 8 (Eight) Hours As Needed for Moderate Pain.        Pharmacy where request should be sent: [facility] [address] [place] [address] [phone] [facility] [phone] FX     Last office visit with prescribing clinician: 3/25/2025   Last telemedicine visit with prescribing clinician: Visit date not found   Next office visit with prescribing clinician: 6/27/2025     Additional details provided by patient:     Does the patient have less than a 3 day supply:  [] Yes  [x] No    Dora Bowen Rep   04/11/25 09:39 CDT

## 2025-04-14 ENCOUNTER — HOSPITAL ENCOUNTER (OUTPATIENT)
Dept: BONE DENSITY | Facility: HOSPITAL | Age: 70
Discharge: HOME | End: 2025-04-14
Admitting: NURSE PRACTITIONER
Payer: MEDICARE

## 2025-04-14 PROCEDURE — 77080 DXA BONE DENSITY AXIAL: CPT

## 2025-05-07 DIAGNOSIS — R07.89 CHEST WALL PAIN: ICD-10-CM

## 2025-05-07 NOTE — TELEPHONE ENCOUNTER
Caller: Karen Copeland    Relationship: Self    Best call back number: [phone]     Requested Prescriptions:   Requested Prescriptions     Pending Prescriptions Disp Refills    traMADol (ULTRAM) 50 MG tablet 30 tablet 0     Sig: Take 1 tablet by mouth Every 8 (Eight) Hours As Needed for Moderate Pain.        Pharmacy where request should be sent: [facility] [address] [place] [address] [phone] [facility] [phone]     Last office visit with prescribing clinician: 3/25/2025   Last telemedicine visit with prescribing clinician: Visit date not found   Next office visit with prescribing clinician: 6/27/2025     Additional details provided by patient: PATIENT HAS 3 DAYS LEFT.    Does the patient have less than a 3 day supply:  [x] Yes  [] No    Dora Bowen Rep   05/07/25 09:26 CDT

## 2025-05-08 RX ORDER — TRAMADOL HYDROCHLORIDE 50 MG/1
50 TABLET ORAL EVERY 8 HOURS PRN
Qty: 30 TABLET | Refills: 0 | Status: SHIPPED | OUTPATIENT
Start: 2025-05-08

## 2025-06-04 DIAGNOSIS — R07.89 CHEST WALL PAIN: ICD-10-CM

## 2025-06-04 RX ORDER — TRAMADOL HYDROCHLORIDE 50 MG/1
50 TABLET ORAL EVERY 8 HOURS PRN
Qty: 30 TABLET | Refills: 0 | Status: SHIPPED | OUTPATIENT
Start: 2025-06-04

## 2025-06-04 NOTE — TELEPHONE ENCOUNTER
Caller: Karen Copeland    Relationship: Self    Best call back number:     Requested Prescriptions:   Requested Prescriptions     Pending Prescriptions Disp Refills    traMADol (ULTRAM) 50 MG tablet 30 tablet 0     Sig: Take 1 tablet by mouth Every 8 (Eight) Hours As Needed for Moderate Pain.        Pharmacy where request should be sent: [facility] [address] [place] [address] [phone] [facility] [phone] FX     Last office visit with prescribing clinician: 3/25/2025   Last telemedicine visit with prescribing clinician: Visit date not found   Next office visit with prescribing clinician: 6/27/2025         Does the patient have less than a 3 day supply:  [] Yes  [x] No    Would you like a call back once the refill request has been completed: [] Yes [x] No      Dora Mercado Rep   06/04/25 08:20 CDT

## 2025-06-27 ENCOUNTER — OFFICE VISIT (OUTPATIENT)
Dept: INTERNAL MEDICINE | Facility: CLINIC | Age: 70
End: 2025-06-27
Payer: MEDICARE

## 2025-06-27 VITALS
WEIGHT: 117 LBS | DIASTOLIC BLOOD PRESSURE: 85 MMHG | HEIGHT: 62 IN | SYSTOLIC BLOOD PRESSURE: 138 MMHG | OXYGEN SATURATION: 97 % | BODY MASS INDEX: 21.53 KG/M2 | RESPIRATION RATE: 18 BRPM | HEART RATE: 78 BPM | TEMPERATURE: 98.7 F

## 2025-06-27 DIAGNOSIS — R21 RASH: Primary | ICD-10-CM

## 2025-06-27 DIAGNOSIS — E78.00 HIGH CHOLESTEROL: ICD-10-CM

## 2025-06-27 DIAGNOSIS — Z79.899 LONG-TERM USE OF HIGH-RISK MEDICATION: ICD-10-CM

## 2025-06-27 DIAGNOSIS — M25.552 CHRONIC LEFT HIP PAIN: ICD-10-CM

## 2025-06-27 DIAGNOSIS — G89.29 CHRONIC LEFT HIP PAIN: ICD-10-CM

## 2025-06-27 RX ORDER — METHYLPREDNISOLONE SODIUM SUCCINATE 125 MG/2ML
80 INJECTION, POWDER, LYOPHILIZED, FOR SOLUTION INTRAMUSCULAR; INTRAVENOUS ONCE
Status: COMPLETED | OUTPATIENT
Start: 2025-06-27 | End: 2025-06-27

## 2025-06-27 RX ADMIN — METHYLPREDNISOLONE SODIUM SUCCINATE 80 MG: 125 INJECTION, POWDER, LYOPHILIZED, FOR SOLUTION INTRAMUSCULAR; INTRAVENOUS at 11:31

## 2025-06-27 NOTE — PROGRESS NOTES
Subjective     Chief Complaint   Patient presents with    Rash    Pain    Hyperlipidemia       History of Present Illness  The patient is a 70-year-old female who presents for evaluation of an itchy rash.    She reports waking up this morning with an itchy rash on her scalp, back, and chest, but not on her legs. This is the second occurrence of such a rash, with the first instance happening last month. She has not been exposed to any new medications or substances. She has not been in contact with ticks or bedbugs and has not been in grassy areas. She has not consumed peanut butter recently. She has a long-standing pet cat. She speculates that the rash may be due to humidity, although she has not been outdoors recently. She describes the sensation as burning. She has been using a cream prescribed by Dr. Espinal for a lupus rash but did not apply it today. Her current medication regimen includes Tylenol, Protonix, and hydroxyzine, which she takes at night to aid sleep. She took Benadryl this morning to alleviate the itching.    She experienced vomiting yesterday but otherwise feels well.    She smokes cigarettes and drinks approximately 3 to 4 glasses of wine daily.    SOCIAL HISTORY  The patient admits to smoking. The patient drinks about 3 to 4 glasses of wine a day.    FAMILY HISTORY  The patient's brother has psoriasis.    Otherwise complete ROS reviewed and negative except as mentioned in the HPI.    Past Medical History:   Past Medical History:   Diagnosis Date    Anxiety     Depression     Hyperlipidemia     Liver enzyme elevation     Sleep apnea      Past Surgical History:History reviewed. No pertinent surgical history.  Social History:  reports that she has been smoking cigarettes. She started smoking about 21 years ago. She has a 5.4 pack-year smoking history. She has been exposed to tobacco smoke. She has never used smokeless tobacco. She reports current alcohol use of about 10.0 standard drinks of  alcohol per week. She reports current drug use. Drug: Marijuana.    Family History: family history includes ADD / ADHD in her son; Arthritis in her mother and sister; Breast cancer in her sister; Cancer in her mother and sister; Hyperlipidemia in her mother; Lupus in her brother, maternal aunt, and sister.       Allergies:  Allergies   Allergen Reactions    Amoxicillin Diarrhea, Nausea Only and Other (See Comments)     Stomach cramping and fatigue    Peanut-Containing Drug Products Shortness Of Breath    Diclofenac Sodium Unknown - High Severity     Flushing and soa     Medications:  Prior to Admission medications    Medication Sig Start Date End Date Taking? Authorizing Provider   aspirin 81 MG tablet Take 1 tablet by mouth Daily.    ProviderMsohe MD   atorvastatin (LIPITOR) 40 MG tablet Take 1 tablet by mouth once daily 3/27/25   Afua Dey APRN   azithromycin (Zithromax Z-Boaz) 250 MG tablet Take 2 tablets the first day, then 1 tablet daily for 4 days. 4/11/25   Afua Dey APRN   coenzyme Q10 50 MG capsule capsule Take 1 capsule by mouth Daily.    ProviderMoshe MD   dicyclomine (BENTYL) 10 MG capsule Take 1 capsule by mouth 4 (Four) Times a Day Before Meals & at Bedtime. 4/26/24   Afua Dey APRN   famotidine (PEPCID) 40 MG tablet Take 1 tablet by mouth Daily. 4/26/24   Afua Dey APRN   fluocinonide (LIDEX) 0.05 % ointment Apply 1 Application topically to the appropriate area as directed 2 (Two) Times a Day.    ProviderMoshe MD   fluticasone (FLONASE) 50 MCG/ACT nasal spray Administer 2 sprays into the nostril(s) as directed by provider Daily. 10/11/24   Suly Ingram APRN   gemfibrozil (LOPID) 600 MG tablet Take 1 tablet by mouth 2 (Two) Times a Day Before Meals. 12/10/24   Afua Dey APRN   meclizine (ANTIVERT) 12.5 MG tablet Take 1 tablet by mouth 3 (Three) Times a Day As Needed for Dizziness. 10/11/24   Juan Jose  "COLLEEN Tubbs   meclizine (ANTIVERT) 25 MG tablet Take 1 tablet by mouth 3 (Three) Times a Day As Needed for Dizziness. 3/25/25   Afua Dey APRN   methylPREDNISolone (MEDROL) 4 MG dose pack Take as directed on package instructions. 4/11/25   Afua Dey APRN   mupirocin (BACTROBAN) 2 % ointment Apply 1 Application topically to the appropriate area as directed 2 (Two) Times a Day. 9/23/24   Afua Dey APRN   pantoprazole (PROTONIX) 40 MG EC tablet Take 1 tablet by mouth Daily. 9/23/24   Afua Dey APRN   traMADol (ULTRAM) 50 MG tablet Take 1 tablet by mouth Every 8 (Eight) Hours As Needed for Moderate Pain. 6/4/25   Afua Dey APRN       Objective     Vital Signs: /85   Pulse 78   Temp 98.7 °F (37.1 °C)   Resp 18   Ht 157.5 cm (62\")   Wt 53.1 kg (117 lb)   SpO2 97%   BMI 21.40 kg/m²     Physical Exam  Physical Exam    Physical Exam  Vitals reviewed.   Constitutional:       Appearance: Normal appearance. She is well-developed.   HENT:      Head: Normocephalic and atraumatic.   Eyes:      Pupils: Pupils are equal, round, and reactive to light.   Neck:      Vascular: No JVD.   Cardiovascular:      Rate and Rhythm: Normal rate and regular rhythm.   Pulmonary:      Effort: Pulmonary effort is normal.      Breath sounds: Normal breath sounds.   Abdominal:      General: Bowel sounds are normal.      Palpations: Abdomen is soft.   Musculoskeletal:         General: No deformity.      Cervical back: Normal range of motion and neck supple.   Lymphadenopathy:      Cervical: No cervical adenopathy.   Skin:     General: Skin is warm and dry.      Findings: Rash (Specific rash that is broad-based blanchable over her entire torso and the antecubital space of bilateral arms and in the bends of her wrist not plaquing very pruritic) present.   Neurological:      General: No focal deficit present.      Mental Status: She is alert and oriented to person, " place, and time.   Psychiatric:         Behavior: Behavior normal.         Thought Content: Thought content normal.         Judgment: Judgment normal.           BMI is within normal parameters. No other follow-up for BMI required.      Results Reviewed:  Glucose   Date Value Ref Range Status   09/23/2024 96 70 - 99 mg/dL Final   10/21/2019 106 74 - 109 mg/dL Final     BUN   Date Value Ref Range Status   09/23/2024 13 8 - 27 mg/dL Final   10/21/2019 11 8 - 23 mg/dL Final     Creatinine   Date Value Ref Range Status   09/23/2024 0.55 (L) 0.57 - 1.00 mg/dL Final   10/21/2019 0.5 0.5 - 0.9 mg/dL Final     Sodium   Date Value Ref Range Status   09/23/2024 143 134 - 144 mmol/L Final   10/21/2019 141 136 - 145 mmol/L Final     Potassium   Date Value Ref Range Status   09/23/2024 4.3 3.5 - 5.2 mmol/L Final   10/21/2019 3.7 3.5 - 5.0 mmol/L Final     Chloride   Date Value Ref Range Status   09/23/2024 104 96 - 106 mmol/L Final   10/21/2019 102 98 - 111 mmol/L Final     CO2   Date Value Ref Range Status   10/21/2019 20 (L) 22 - 29 mmol/L Final     Total CO2   Date Value Ref Range Status   09/23/2024 22 20 - 29 mmol/L Final     Calcium   Date Value Ref Range Status   09/23/2024 9.8 8.7 - 10.3 mg/dL Final   10/21/2019 9.5 8.8 - 10.2 mg/dL Final     ALT (SGPT)   Date Value Ref Range Status   09/23/2024 14 0 - 32 IU/L Final   11/19/2019 35 (H) 5 - 33 U/L Final     AST (SGOT)   Date Value Ref Range Status   09/23/2024 15 0 - 40 IU/L Final   11/19/2019 28 5 - 32 U/L Final     WBC   Date Value Ref Range Status   09/23/2024 5.0 3.4 - 10.8 x10E3/uL Final   10/21/2019 6.2 4.8 - 10.8 K/uL Final     Hematocrit   Date Value Ref Range Status   09/23/2024 38.4 34.0 - 46.6 % Final   10/21/2019 45.7 37.0 - 47.0 % Final     Platelets   Date Value Ref Range Status   09/23/2024 284 150 - 450 x10E3/uL Final   10/21/2019 250 130 - 400 K/uL Final     Triglycerides   Date Value Ref Range Status   09/23/2024 173 (H) 0 - 149 mg/dL Final   10/21/2019  524 (H) 0 - 149 mg/dL Final     HDL Cholesterol   Date Value Ref Range Status   09/23/2024 72 >39 mg/dL Final   10/21/2019 75 65 - 121 mg/dL Final     Comment:     VALUES>60 MG/DL ARE ASSOCIATED WITH A DECREASED RISK OF  ATHEROSCLEROTIC CARDIOVASCULAR DISEASE     LDL Cholesterol    Date Value Ref Range Status   10/21/2019 see below <100 mg/dL Final     Comment:     When the triglyceride is >400 mg/dL the calculated LDL and  VLDL are not valid.  <100 MG/DL=OPITIMAL    100-129 MG/DL=DESIRABLE    130-159 MG/DL BORDERLINE=INCREASED RISK OF ATHEROSCLEROTIC  CARDIOVASCULAR DISEASE    > OR = 160 MG/DL=ASSOCIATED WITH AN INCREASE RISK OF  ATHEROSCLEROTIC CARDIOVASCULAR DISEASE   10/21/2019 100 <100 mg/dL Final     Comment:     <100 MG/DL=OPITIMAL    100-129 MG/DL=DESIRABLE    130-159 MG/DL BORDERLINE=INCREASED RISK OF ATHEROSCLEROTIC  CARDIOVASCULAR DISEASE    > OR = 160 MG/DL=ASSOCIATED WITH AN INCREASE RISK OF  ATHEROSCLEROTIC CARDIOVASCULAR DISEASE     LDL Chol Calc (NIH)   Date Value Ref Range Status   09/23/2024 92 0 - 99 mg/dL Final     Hemoglobin A1C   Date Value Ref Range Status   12/03/2021 5.1 % Final       Results        Assessment / Plan     Assessment/Plan:  Diagnoses and all orders for this visit:    1. Rash (Primary)  -     methylPREDNISolone sodium succinate (SOLU-Medrol) injection 80 mg  -     CBC & Differential  -     Comprehensive Metabolic Panel  -     Lyme Disease Total Antibody With Reflex to Immunoassay  -     Ehrlichia Profile DNA PCR  -     Rickettsia Species DNA, Real-Time PCR    2. Long-term use of high-risk medication  -     Compliance Drug Analysis, Ur - Urine, Clean Catch    3. Chronic left hip pain   On Tramadol.   4. High cholesterol  -     Lipid Panel    Assessment & Plan  1. Pruritic rash.  - The rash does not appear to be consistent with a lupus rash, but rather suggests exposure to an external irritant.  - A steroid injection will be administered today to alleviate the symptoms.  - She is  advised to take photographs of the rash, particularly those located on her back and under her bra, and share these with Dr. Espinal, her dermatologist. Additionally, she is encouraged to schedule an appointment with him.  - Blood work and a urinalysis will be conducted today.    Follow-up  - The patient will follow up in 3 months for her Medicare wellness visit.    PROCEDURE  Procedure: Steroid Injection, left arm    All questions were answered and agreement to proceed was given after the following Pre-Procedure details were reviewed:  - Risks and Benefits: Discussed potential benefits of reducing inflammation and itching; risks of potential side effects from steroid injection.  - Alternative Options: Discussed alternative options including oral antihistamines and topical treatments.  - Side effects: Discussed potential side effects including localized pain, infection, and systemic effects of steroids.  - Consent: Verbal consent obtained after discussing the above details.    Intra-Procedure:  - Time-Out: Confirmed patient identity, procedure, and site.  - Site Preparation: Cleaned the injection site with alcohol swab.  - Medication: Administered steroid injection.  - Hemostasis: Applied pressure to the injection site.  - Dressing: Applied bandage to the injection site.    Post-Procedure:  - Tolerance Level: Patient tolerated the procedure well.  - Home Care Instructions: Advised to monitor the injection site for signs of infection, avoid strenuous activity involving the injected arm, and follow up if symptoms persist or worsen.    Return in about 3 months (around 9/27/2025) for Medicare Wellness. unless patient needs to be seen sooner or acute issues arise.    Code Status: Full  Patient or patient representative verbalized consent for the use of Ambient Listening during the visit with  COLLEEN Patton for chart documentation. 6/27/2025  13:04 CDT  I have discussed the patient results/orders and and  plan/recommendation with them at today's visit.      Signed by:    COLLEEN Patton Date: 06/27/25

## 2025-06-30 DIAGNOSIS — R07.89 CHEST WALL PAIN: ICD-10-CM

## 2025-06-30 RX ORDER — TRAMADOL HYDROCHLORIDE 50 MG/1
50 TABLET ORAL EVERY 8 HOURS PRN
Qty: 30 TABLET | Refills: 0 | Status: SHIPPED | OUTPATIENT
Start: 2025-07-03

## 2025-06-30 NOTE — TELEPHONE ENCOUNTER
Caller: Min Karenvenice Lizarraga    Relationship: Self    Best call back number: [phone]    Requested Prescriptions:   Requested Prescriptions     Pending Prescriptions Disp Refills    traMADol (ULTRAM) 50 MG tablet 30 tablet 0     Sig: Take 1 tablet by mouth Every 8 (Eight) Hours As Needed for Moderate Pain.        Pharmacy where request should be sent: [facility] [address] [place] [address] [phone] [facility] [phone] FX     Last office visit with prescribing clinician: 6/27/2025   Last telemedicine visit with prescribing clinician: Visit date not found   Next office visit with prescribing clinician: 10/3/2025         Does the patient have less than a 3 day supply:  [] Yes  [x] No    Would you like a call back once the refill request has been completed: [] Yes [x] No        Dora Mercado Rep   06/30/25 14:25 CDT

## 2025-07-04 LAB
A PHAGOCYTOPH DNA BLD QL NAA+PROBE: NEGATIVE
ALBUMIN SERPL-MCNC: 5 G/DL (ref 3.9–4.9)
ALP SERPL-CCNC: 121 IU/L (ref 44–121)
ALT SERPL-CCNC: 21 IU/L (ref 0–32)
AST SERPL-CCNC: 21 IU/L (ref 0–40)
B BURGDOR IGG+IGM SER QL IA: NEGATIVE
BASOPHILS # BLD AUTO: 0 X10E3/UL (ref 0–0.2)
BASOPHILS NFR BLD AUTO: 1 %
BILIRUB SERPL-MCNC: 0.3 MG/DL (ref 0–1.2)
BUN SERPL-MCNC: 6 MG/DL (ref 8–27)
BUN/CREAT SERPL: 11 (ref 12–28)
CALCIUM SERPL-MCNC: 9.8 MG/DL (ref 8.7–10.3)
CHLORIDE SERPL-SCNC: 102 MMOL/L (ref 96–106)
CHOLEST SERPL-MCNC: 200 MG/DL (ref 100–199)
CO2 SERPL-SCNC: 22 MMOL/L (ref 20–29)
CREAT SERPL-MCNC: 0.56 MG/DL (ref 0.57–1)
DRUGS UR: NORMAL
EGFRCR SERPLBLD CKD-EPI 2021: 98 ML/MIN/1.73
EHRLICHIA DNA SPEC QL NAA+PROBE: NEGATIVE
EOSINOPHIL # BLD AUTO: 0.1 X10E3/UL (ref 0–0.4)
EOSINOPHIL NFR BLD AUTO: 1 %
ERYTHROCYTE [DISTWIDTH] IN BLOOD BY AUTOMATED COUNT: 12.8 % (ref 11.7–15.4)
GLOBULIN SER CALC-MCNC: 2.1 G/DL (ref 1.5–4.5)
GLUCOSE SERPL-MCNC: 90 MG/DL (ref 70–99)
HCT VFR BLD AUTO: 40.6 % (ref 34–46.6)
HDLC SERPL-MCNC: 68 MG/DL
HGB BLD-MCNC: 13.2 G/DL (ref 11.1–15.9)
IMM GRANULOCYTES # BLD AUTO: 0 X10E3/UL (ref 0–0.1)
IMM GRANULOCYTES NFR BLD AUTO: 0 %
LDLC SERPL CALC-MCNC: 93 MG/DL (ref 0–99)
LYMPHOCYTES # BLD AUTO: 1.6 X10E3/UL (ref 0.7–3.1)
LYMPHOCYTES NFR BLD AUTO: 25 %
MCH RBC QN AUTO: 30.3 PG (ref 26.6–33)
MCHC RBC AUTO-ENTMCNC: 32.5 G/DL (ref 31.5–35.7)
MCV RBC AUTO: 93 FL (ref 79–97)
MONOCYTES # BLD AUTO: 0.4 X10E3/UL (ref 0.1–0.9)
MONOCYTES NFR BLD AUTO: 6 %
NEUTROPHILS # BLD AUTO: 4.5 X10E3/UL (ref 1.4–7)
NEUTROPHILS NFR BLD AUTO: 67 %
PLATELET # BLD AUTO: 270 X10E3/UL (ref 150–450)
POTASSIUM SERPL-SCNC: 3.9 MMOL/L (ref 3.5–5.2)
PROT SERPL-MCNC: 7.1 G/DL (ref 6–8.5)
RBC # BLD AUTO: 4.35 X10E6/UL (ref 3.77–5.28)
RICKETTSIA RICKETTSII DNA, RT: NOT DETECTED
SODIUM SERPL-SCNC: 140 MMOL/L (ref 134–144)
TRIGL SERPL-MCNC: 236 MG/DL (ref 0–149)
VLDLC SERPL CALC-MCNC: 39 MG/DL (ref 5–40)
WBC # BLD AUTO: 6.6 X10E3/UL (ref 3.4–10.8)

## 2025-07-28 DIAGNOSIS — R07.89 CHEST WALL PAIN: ICD-10-CM

## 2025-07-28 RX ORDER — TRAMADOL HYDROCHLORIDE 50 MG/1
50 TABLET ORAL EVERY 8 HOURS PRN
Qty: 30 TABLET | Refills: 0 | Status: SHIPPED | OUTPATIENT
Start: 2025-07-28

## 2025-07-28 NOTE — TELEPHONE ENCOUNTER
Caller: CopelandKaren    Relationship: Self    Best call back number: [phone]    Requested Prescriptions:   Requested Prescriptions     Pending Prescriptions Disp Refills    traMADol (ULTRAM) 50 MG tablet 30 tablet 0     Sig: Take 1 tablet by mouth Every 8 (Eight) Hours As Needed for Moderate Pain.        Pharmacy where request should be sent: [facility] [address] [place] [address] [phone] [facility] [phone]      Last office visit with prescribing clinician: 6/27/2025   Last telemedicine visit with prescribing clinician: Visit date not found   Next office visit with prescribing clinician: 10/3/2025     Does the patient have less than a 3 day supply:  [x] Yes  [] No    Would you like a call back once the refill request has been completed: [] Yes [] No    If the office needs to give you a call back, can they leave a voicemail: [] Yes [] No    Dora Padron   07/28/25 09:50 CDT

## 2025-08-25 DIAGNOSIS — R07.89 CHEST WALL PAIN: ICD-10-CM

## 2025-08-25 RX ORDER — TRAMADOL HYDROCHLORIDE 50 MG/1
50 TABLET ORAL EVERY 8 HOURS PRN
Qty: 30 TABLET | Refills: 0 | Status: SHIPPED | OUTPATIENT
Start: 2025-08-25